# Patient Record
Sex: MALE | Race: ASIAN | NOT HISPANIC OR LATINO | URBAN - METROPOLITAN AREA
[De-identification: names, ages, dates, MRNs, and addresses within clinical notes are randomized per-mention and may not be internally consistent; named-entity substitution may affect disease eponyms.]

---

## 2018-01-08 ENCOUNTER — OUTPATIENT (OUTPATIENT)
Dept: OUTPATIENT SERVICES | Facility: HOSPITAL | Age: 78
LOS: 1 days | End: 2018-01-08
Payer: COMMERCIAL

## 2018-01-08 ENCOUNTER — APPOINTMENT (OUTPATIENT)
Dept: CV DIAGNOSTICS | Facility: HOSPITAL | Age: 78
End: 2018-01-08

## 2018-01-08 DIAGNOSIS — R07.2 PRECORDIAL PAIN: ICD-10-CM

## 2018-01-08 PROCEDURE — 93016 CV STRESS TEST SUPVJ ONLY: CPT

## 2018-01-08 PROCEDURE — 93017 CV STRESS TEST TRACING ONLY: CPT

## 2018-01-08 PROCEDURE — 78452 HT MUSCLE IMAGE SPECT MULT: CPT | Mod: 26

## 2018-01-08 PROCEDURE — 78452 HT MUSCLE IMAGE SPECT MULT: CPT

## 2018-01-08 PROCEDURE — A9500: CPT

## 2018-01-08 PROCEDURE — 93018 CV STRESS TEST I&R ONLY: CPT

## 2018-02-01 ENCOUNTER — APPOINTMENT (OUTPATIENT)
Dept: MRI IMAGING | Facility: CLINIC | Age: 78
End: 2018-02-01
Payer: MEDICAID

## 2018-02-01 ENCOUNTER — OUTPATIENT (OUTPATIENT)
Dept: OUTPATIENT SERVICES | Facility: HOSPITAL | Age: 78
LOS: 1 days | End: 2018-02-01
Payer: COMMERCIAL

## 2018-02-01 DIAGNOSIS — Z00.8 ENCOUNTER FOR OTHER GENERAL EXAMINATION: ICD-10-CM

## 2018-02-01 PROCEDURE — 70551 MRI BRAIN STEM W/O DYE: CPT | Mod: 26

## 2018-02-01 PROCEDURE — 70551 MRI BRAIN STEM W/O DYE: CPT

## 2018-02-13 ENCOUNTER — APPOINTMENT (OUTPATIENT)
Dept: PULMONOLOGY | Facility: CLINIC | Age: 78
End: 2018-02-13

## 2018-04-10 ENCOUNTER — APPOINTMENT (OUTPATIENT)
Dept: PULMONOLOGY | Facility: CLINIC | Age: 78
End: 2018-04-10

## 2018-08-01 ENCOUNTER — OUTPATIENT (OUTPATIENT)
Dept: OUTPATIENT SERVICES | Facility: HOSPITAL | Age: 78
LOS: 1 days | End: 2018-08-01
Payer: MEDICARE

## 2018-08-01 PROCEDURE — G9001: CPT

## 2018-08-22 ENCOUNTER — INPATIENT (INPATIENT)
Facility: HOSPITAL | Age: 78
LOS: 1 days | Discharge: HOME | DRG: 872 | End: 2018-08-24
Attending: INTERNAL MEDICINE | Admitting: INTERNAL MEDICINE
Payer: MEDICARE

## 2018-08-22 VITALS
OXYGEN SATURATION: 94 % | DIASTOLIC BLOOD PRESSURE: 50 MMHG | TEMPERATURE: 99 F | WEIGHT: 169.98 LBS | HEIGHT: 65 IN | HEART RATE: 71 BPM | RESPIRATION RATE: 15 BRPM | SYSTOLIC BLOOD PRESSURE: 76 MMHG

## 2018-08-22 DIAGNOSIS — N39.0 URINARY TRACT INFECTION, SITE NOT SPECIFIED: ICD-10-CM

## 2018-08-22 DIAGNOSIS — Z98.41 CATARACT EXTRACTION STATUS, RIGHT EYE: Chronic | ICD-10-CM

## 2018-08-22 LAB
ALBUMIN SERPL ELPH-MCNC: 3.3 G/DL — SIGNIFICANT CHANGE UP (ref 3.3–5)
ALP SERPL-CCNC: 61 U/L — SIGNIFICANT CHANGE UP (ref 30–120)
ALT FLD-CCNC: 16 U/L DA — SIGNIFICANT CHANGE UP (ref 10–60)
ANION GAP SERPL CALC-SCNC: 14 MMOL/L — SIGNIFICANT CHANGE UP (ref 5–17)
APPEARANCE UR: ABNORMAL
AST SERPL-CCNC: 24 U/L — SIGNIFICANT CHANGE UP (ref 10–40)
BASOPHILS # BLD AUTO: 0 K/UL — SIGNIFICANT CHANGE UP (ref 0–0.2)
BASOPHILS NFR BLD AUTO: 0 % — SIGNIFICANT CHANGE UP (ref 0–2)
BILIRUB SERPL-MCNC: 0.9 MG/DL — SIGNIFICANT CHANGE UP (ref 0.2–1.2)
BILIRUB UR-MCNC: NEGATIVE — SIGNIFICANT CHANGE UP
BUN SERPL-MCNC: 33 MG/DL — HIGH (ref 7–23)
CALCIUM SERPL-MCNC: 8.4 MG/DL — SIGNIFICANT CHANGE UP (ref 8.4–10.5)
CHLORIDE SERPL-SCNC: 100 MMOL/L — SIGNIFICANT CHANGE UP (ref 96–108)
CK MB BLD-MCNC: 0.6 % — SIGNIFICANT CHANGE UP (ref 0–3.5)
CK MB CFR SERPL CALC: 3.9 NG/ML — HIGH (ref 0–3.6)
CK SERPL-CCNC: 697 U/L — HIGH (ref 39–308)
CO2 SERPL-SCNC: 21 MMOL/L — LOW (ref 22–31)
COLOR SPEC: YELLOW — SIGNIFICANT CHANGE UP
CREAT SERPL-MCNC: 1.89 MG/DL — HIGH (ref 0.5–1.3)
DIFF PNL FLD: ABNORMAL
EOSINOPHIL # BLD AUTO: 0 K/UL — SIGNIFICANT CHANGE UP (ref 0–0.5)
EOSINOPHIL NFR BLD AUTO: 0 % — SIGNIFICANT CHANGE UP (ref 0–6)
GLUCOSE SERPL-MCNC: 101 MG/DL — HIGH (ref 70–99)
GLUCOSE UR QL: NEGATIVE MG/DL — SIGNIFICANT CHANGE UP
HCT VFR BLD CALC: 35.8 % — LOW (ref 39–50)
HGB BLD-MCNC: 12.2 G/DL — LOW (ref 13–17)
KETONES UR-MCNC: NEGATIVE — SIGNIFICANT CHANGE UP
LACTATE SERPL-SCNC: 1.6 MMOL/L — SIGNIFICANT CHANGE UP (ref 0.7–2)
LACTATE SERPL-SCNC: 2.4 MMOL/L — HIGH (ref 0.7–2)
LEUKOCYTE ESTERASE UR-ACNC: ABNORMAL
LIDOCAIN IGE QN: 74 U/L — SIGNIFICANT CHANGE UP (ref 73–393)
LYMPHOCYTES # BLD AUTO: 1.04 K/UL — SIGNIFICANT CHANGE UP (ref 1–3.3)
LYMPHOCYTES # BLD AUTO: 8 % — LOW (ref 13–44)
MCHC RBC-ENTMCNC: 29.3 PG — SIGNIFICANT CHANGE UP (ref 27–34)
MCHC RBC-ENTMCNC: 34.1 GM/DL — SIGNIFICANT CHANGE UP (ref 32–36)
MCV RBC AUTO: 86.1 FL — SIGNIFICANT CHANGE UP (ref 80–100)
MONOCYTES # BLD AUTO: 0.78 K/UL — SIGNIFICANT CHANGE UP (ref 0–0.9)
MONOCYTES NFR BLD AUTO: 6 % — SIGNIFICANT CHANGE UP (ref 2–14)
NEUTROPHILS # BLD AUTO: 11.17 K/UL — HIGH (ref 1.8–7.4)
NEUTROPHILS NFR BLD AUTO: 78 % — HIGH (ref 43–77)
NITRITE UR-MCNC: POSITIVE
PH UR: 5 — SIGNIFICANT CHANGE UP (ref 5–8)
PLATELET # BLD AUTO: 149 K/UL — LOW (ref 150–400)
POTASSIUM SERPL-MCNC: 3.7 MMOL/L — SIGNIFICANT CHANGE UP (ref 3.5–5.3)
POTASSIUM SERPL-SCNC: 3.7 MMOL/L — SIGNIFICANT CHANGE UP (ref 3.5–5.3)
PROT SERPL-MCNC: 6.9 G/DL — SIGNIFICANT CHANGE UP (ref 6–8.3)
PROT UR-MCNC: 30 MG/DL
RBC # BLD: 4.16 M/UL — LOW (ref 4.2–5.8)
RBC # FLD: 12.8 % — SIGNIFICANT CHANGE UP (ref 10.3–14.5)
SODIUM SERPL-SCNC: 135 MMOL/L — SIGNIFICANT CHANGE UP (ref 135–145)
SP GR SPEC: 1.01 — SIGNIFICANT CHANGE UP (ref 1.01–1.02)
TROPONIN I SERPL-MCNC: 0.14 NG/ML — HIGH (ref 0.02–0.06)
UROBILINOGEN FLD QL: NEGATIVE MG/DL — SIGNIFICANT CHANGE UP
WBC # BLD: 12.99 K/UL — HIGH (ref 3.8–10.5)
WBC # FLD AUTO: 12.99 K/UL — HIGH (ref 3.8–10.5)

## 2018-08-22 RX ORDER — ASPIRIN/CALCIUM CARB/MAGNESIUM 324 MG
325 TABLET ORAL ONCE
Qty: 0 | Refills: 0 | Status: COMPLETED | OUTPATIENT
Start: 2018-08-22 | End: 2018-08-22

## 2018-08-22 RX ORDER — NITROGLYCERIN 6.5 MG
0.4 CAPSULE, EXTENDED RELEASE ORAL ONCE
Qty: 0 | Refills: 0 | Status: COMPLETED | OUTPATIENT
Start: 2018-08-22 | End: 2018-08-22

## 2018-08-22 RX ORDER — ACETAMINOPHEN 500 MG
650 TABLET ORAL ONCE
Qty: 0 | Refills: 0 | Status: COMPLETED | OUTPATIENT
Start: 2018-08-22 | End: 2018-08-22

## 2018-08-22 RX ORDER — SODIUM CHLORIDE 9 MG/ML
1000 INJECTION INTRAMUSCULAR; INTRAVENOUS; SUBCUTANEOUS ONCE
Qty: 0 | Refills: 0 | Status: COMPLETED | OUTPATIENT
Start: 2018-08-22 | End: 2018-08-22

## 2018-08-22 RX ORDER — SODIUM CHLORIDE 9 MG/ML
500 INJECTION INTRAMUSCULAR; INTRAVENOUS; SUBCUTANEOUS ONCE
Qty: 0 | Refills: 0 | Status: COMPLETED | OUTPATIENT
Start: 2018-08-22 | End: 2018-08-22

## 2018-08-22 RX ORDER — CEFTRIAXONE 500 MG/1
1 INJECTION, POWDER, FOR SOLUTION INTRAMUSCULAR; INTRAVENOUS ONCE
Qty: 0 | Refills: 0 | Status: COMPLETED | OUTPATIENT
Start: 2018-08-22 | End: 2018-08-22

## 2018-08-22 RX ADMIN — SODIUM CHLORIDE 1000 MILLILITER(S): 9 INJECTION INTRAMUSCULAR; INTRAVENOUS; SUBCUTANEOUS at 19:23

## 2018-08-22 RX ADMIN — Medication 0.4 MILLIGRAM(S): at 23:41

## 2018-08-22 RX ADMIN — SODIUM CHLORIDE 1000 MILLILITER(S): 9 INJECTION INTRAMUSCULAR; INTRAVENOUS; SUBCUTANEOUS at 16:45

## 2018-08-22 RX ADMIN — CEFTRIAXONE 1 GRAM(S): 500 INJECTION, POWDER, FOR SOLUTION INTRAMUSCULAR; INTRAVENOUS at 18:03

## 2018-08-22 RX ADMIN — Medication 325 MILLIGRAM(S): at 18:27

## 2018-08-22 RX ADMIN — CEFTRIAXONE 100 GRAM(S): 500 INJECTION, POWDER, FOR SOLUTION INTRAMUSCULAR; INTRAVENOUS at 17:33

## 2018-08-22 RX ADMIN — SODIUM CHLORIDE 500 MILLILITER(S): 9 INJECTION INTRAMUSCULAR; INTRAVENOUS; SUBCUTANEOUS at 19:45

## 2018-08-22 RX ADMIN — SODIUM CHLORIDE 500 MILLILITER(S): 9 INJECTION INTRAMUSCULAR; INTRAVENOUS; SUBCUTANEOUS at 18:45

## 2018-08-22 RX ADMIN — SODIUM CHLORIDE 1000 MILLILITER(S): 9 INJECTION INTRAMUSCULAR; INTRAVENOUS; SUBCUTANEOUS at 18:23

## 2018-08-22 RX ADMIN — SODIUM CHLORIDE 1000 MILLILITER(S): 9 INJECTION INTRAMUSCULAR; INTRAVENOUS; SUBCUTANEOUS at 17:45

## 2018-08-22 RX ADMIN — Medication 650 MILLIGRAM(S): at 17:03

## 2018-08-22 NOTE — H&P ADULT - NSHPREVIEWOFSYSTEMS_GEN_ALL_CORE
-    CONSTITUTIONAL: (+) fever & chills, with generalized body aches.  EYES: No eye pain, visual disturbances (blind left eye), or discharge.  ENMT:  No difficulty hearing, tinnitus, vertigo; No sinus or throat pain.  NECK: No pain or stiffness.  RESPIRATORY: No cough, wheezing, or hemoptysis; No shortness of breath.  CARDIOVASCULAR: No chest pain, palpitations, dizziness, or leg swelling.  GASTROINTESTINAL: No abdominal pain, no nausea, vomiting, or hematemesis; No diarrhea or Change in bowel habits. No melena or hematochezia.  GENITOURINARY: (+) dysuria, no abnormal frequency, incontinence.  NEUROLOGICAL: No headaches, focal muscle weakness, numbness, or tremors.  SKIN: No itching, burning or rashes.  MUSCULOSKELETAL: No joint swelling or pain.  PSYCHIATRIC: No depression, anxiety, or agitation.  HEME/LYMPH: No easy bruising, bleeding gums, or nose bleed.  ALLERGY AND IMMUNOLOGIC: No hives or eczema.

## 2018-08-22 NOTE — CONSULT NOTE ADULT - SUBJECTIVE AND OBJECTIVE BOX
History of Present Illness: The patient is a 78 year old male with a history of HTN, left eye blindness who presents with nausea, vomiting, dysuria since last night. He noted some blood in his urine today. As per family, he has had multiple UTIs this past year. He went to urgent care today and was referred to ED. In ED, noted to be hypotensive and results consistent with UTI. He was also noted to have mildly elevated cardiac enzymes. He has had intermittent left-sided chest/shoulder pain over the past few weeks when he is lying down, none when he exerts himself. No shortness of breath. He had a nuclear stress test one year ago, which was normal.    Past Medical/Surgical History:  HTN, left eye blindness    Medications:  Home Medications:  aspirin 81 mg oral tablet: 1 tab(s) orally once a day (22 Aug 2018 20:09)  atenolol    Family History: Non-contributory family history of premature cardiovascular atherosclerotic disease    Social History: No tobacco, alcohol or drug use    Review of Systems:  General: No fevers, chills, weight loss or gain  Skin: No rashes, color changes  Cardiovascular: No chest pain, orthopnea  Respiratory: No shortness of breath, cough  Gastrointestinal: No nausea, abdominal pain  Genitourinary: No incontinence, pain with urination  Musculoskeletal: No pain, swelling, decreased range of motion  Neurological: No headache, weakness  Psychiatric: No depression, anxiety  Endocrine: No weight loss or gain, increased thirst  All other systems are comprehensively negative.    Physical Exam:  Vitals:        Vital Signs Last 24 Hrs  T(C): 37.1 (22 Aug 2018 18:28), Max: 37.1 (22 Aug 2018 16:14)  T(F): 98.7 (22 Aug 2018 18:28), Max: 98.8 (22 Aug 2018 16:14)  HR: 65 (22 Aug 2018 19:15) (65 - 78)  BP: 103/50 (22 Aug 2018 19:15) (76/50 - 113/53)  BP(mean): --  RR: 15 (22 Aug 2018 19:15) (15 - 27)  SpO2: 96% (22 Aug 2018 19:15) (94% - 97%)  General: NAD  HEENT: MMM  Neck: No JVD, no carotid bruit  Lungs: CTAB  CV: RRR, nl S1/S2, no M/R/G  Abdomen: S/NT/ND, +BS  Extremities: No LE edema, no cyanosis  Neuro: AAOx3, non-focal  Skin: No rash    Labs:                        12.2   12.99 )-----------( 149      ( 22 Aug 2018 16:57 )             35.8     08-22    135  |  100  |  33<H>  ----------------------------<  101<H>  3.7   |  21<L>  |  1.89<H>    Ca    8.4      22 Aug 2018 16:57    TPro  6.9  /  Alb  3.3  /  TBili  0.9  /  DBili  x   /  AST  24  /  ALT  16  /  AlkPhos  61  08-22    CARDIAC MARKERS ( 22 Aug 2018 16:57 )  .136 ng/mL / x     / 697 U/L / x     / 3.9 ng/mL          ECG: NSR, normal axis, no ST abnormality

## 2018-08-22 NOTE — ED PROVIDER NOTE - ATTENDING CONTRIBUTION TO CARE
Pt is a 79 yo male who presents to the ED with a cc of vomiting and chills.   PMHx of Blind left eye    Hypertension.  Pt reports that he has not been feeling well since yesterday.  He reports subjective fevers, chills, nausea and vomiting.  He further reports some suprapubic pressure, dysuria and frequency.  He was seen at urgent care and diagnosed with a UTI but sent to the ED for further work up.  Pt denies D/C, CP, SOB, ext numbness or weakness.  On exam pt lying in bed diaphoretic.  NCAT, dry MMM, heart RRR, lungs diminished at the bases, abd with TTP to suprapubic region.  Will obtain screening septic work up, EKG, cardiac enzymes, x-ray chest, CT renal stone hunt.  Will give IVF.  Agree with above plan of care

## 2018-08-22 NOTE — ED PROVIDER NOTE - NONTENDER LOCATION
periumbilical/left costovertebral angle/left upper quadrant/right upper quadrant/right costovertebral angle/suprapubic/left lower quadrant/right lower quadrant/umbilical

## 2018-08-22 NOTE — ED ADULT NURSE NOTE - OBJECTIVE STATEMENT
Patient walked into ER with family from [facility] for body aches, vomiting, dysuria for one day and blood pressure is low 110/60.  Patient has not taken his atenolol for 3 days now .  Urine test at urgent was positive for nitrates, leuks and blood.  B/P very low in triage so placed on stretcher for comfort.  Family at bedside who translates for patient.

## 2018-08-22 NOTE — H&P ADULT - PROBLEM SELECTOR PLAN 6
IMPROVE VTE Individual Risk Assessment          RISK                                                          Points    [  ] Previous VTE                                                3  [  ] Thrombophilia                                             2  [  ] Lower limb paralysis                                    2        (unable to hold up >15 seconds)    [  ] Current Cancer                                             2         (within 6 months)  [ x ] Immobilization > 24 hrs        (expected)        1  [  ] ICU/CCU stay > 24 hours                            1  [ x ] Age > 60                                                    1    IMPROVE VTE Score 2.    ** Patient is at moderate to high risk for VTE, IMPROVE score of 2 in addition to the other risk factors not included in this scoring system, started him on UFH 5000 units subcutaneous every 8 hours for DVT prophylaxis.

## 2018-08-22 NOTE — ED PROVIDER NOTE - OBJECTIVE STATEMENT
77 y/o M with hx of HTN presents with c/o vomiting, generalized weakness and bodyaches x 1 day. 79 y/o M with hx of HTN presents with c/o vomiting, generalized weakness and body aches x 1 day. Pt 79 y/o M with hx of HTN presents with c/o dysuria, vomiting, generalized weakness and body aches x 1 day. Pt states that he was vomiting since last night until 9am this morning. Pt states that he noticed mild blood in his urine today. States that he was seen at urgent care today, had abnormal UA with positive nitrite, large leuks and blood. Pt denies recent travel, sick contacts, abdominal pain, C 79 y/o M with hx of HTN, L eye blindness presents with c/o dysuria, vomiting, generalized weakness, chills since yesterday. Pt states that he was vomiting since last night until 9am this morning. Pt states that he noticed mild blood in his urine today. States that he had body aches yesterday. States that he was seen at urgent care today, had abnormal UA with positive nitrite, large leuks and blood. Pt denies recent travel, sick contacts, abdominal pain, chest pain, SOB, palpitations, fever, back pain, rash, diarrhea, back/flank pain or other symptoms.   PMD: Dr. Fara Allen

## 2018-08-22 NOTE — H&P ADULT - NSHPLABSRESULTS_GEN_ALL_CORE
-      Lactate Trend   @ 20:40 Lactate:1.6    @ 16:57 Lactate:2.4                           12.2   12.99 )-----------( 149      ( 22 Aug 2018 16:57 )             35.8                135  |  100  |  33<H>  ----------------------------<  101<H>  3.7   |  21<L>  |  1.89<H>    Ca    8.4      22 Aug 2018 16:57    TPro  6.9  /  Alb  3.3  /  TBili  0.9  /  DBili  x   /  AST  24  /  ALT  16  /  AlkPhos  61            Urinalysis Basic - ( 22 Aug 2018 17:26 )    Color: Yellow / Appearance: Slightly Turbid / S.010 / pH: x  Gluc: x / Ketone: Negative  / Bili: Negative / Urobili: Negative mg/dL   Blood: x / Protein: 30 mg/dL / Nitrite: Positive   Leuk Esterase: Moderate / RBC: 3-5 /HPF / WBC 11-25   Sq Epi: x / Non Sq Epi: x / Bacteria: x        CARDIAC MARKERS ( 22 Aug 2018 23:42 )  .082 ng/mL / x     / x     / x     / x      CARDIAC MARKERS ( 22 Aug 2018 16:57 )  .136 ng/mL / x     / 697 U/L / x     / 3.9 ng/mL        CT RENAL STONE LOPEZ                    Bladder wall thickening and inflammation suggesting cystitis.   Urinalysis correlation is recommended.   Mild left hydronephrosis without visible obstructing stone.        XR CHEST AP OR PA 1V     A frontal chest film demonstrates scattered emphysematous and fibrotic   changes in both lungs without acute airspace disease.  The heart size and vascular markings are within normal limits for   technique.    No mediastinal or hilar adenopathy is suggested. .   Old rib fracture deformities are noted on the right..   IMPRESSION:  Fibrotic changes both lungs without acute airspace disease.  Image reviewed by me.        EKG:    As per my review shows SR at 75/min, normal OH & QTc intervals, normal QRS voltage, duration, and axis (+45), with normal transition, no ST-T abnormality.      -
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
## 2018-08-22 NOTE — H&P ADULT - HISTORY OF PRESENT ILLNESS
This is a 77 y/o M with PMH of HTN, and Glaucoma s/p left eye blindness, who presented with 3 days history of fever & chills, associated with generalized weakness & body aches. Patient also reports nausea and vomiting last night, with dysuria, so he was seen at an urgent care center, was found to have UTI, sent to ED for further assessment. Denies any chest pain, palpitation, or SOB, no diarrhea or abdominal pain. Patient took 2 doses of Aspirin 325 mg at home 4 hours apart prior to presentation to ED.

## 2018-08-22 NOTE — ED ADULT TRIAGE NOTE - CHIEF COMPLAINT QUOTE
Through family that interprets patient had vomiting from last night until 9AM and abdominal pains with body aches and shaking chills. The patient feels weak now. Patient was seen at Urgent Care and had abnormal UA results.

## 2018-08-22 NOTE — H&P ADULT - PROBLEM SELECTOR PLAN 2
ML 2ry to the above, no documented temp at home, patient received 2 doses of Aspirin 325 mg PO 4 hours apart prior to presentation, lactic acid got normalized after initial IVF bolus, will maintain at 125 ml/h, monitor I & O, trend TLC and f/u culture results while on antibiotic therapy as above.

## 2018-08-22 NOTE — ED PROVIDER NOTE - PROGRESS NOTE DETAILS
Pt examined by ED attending, Dr. Irving who agreed with disposition and plan. Spoke to cardiologist, Dr. Tariq Crocker, will see pt in ED for consult for elevated troponin. Spoke to hospitalist, Dr. Olvera, discussed case and results, will Spoke to hospitalist, Dr. Olvera, discussed case and results, accepted admission to his service.

## 2018-08-22 NOTE — CONSULT NOTE ADULT - ASSESSMENT
The patient is a 78 year old male with a history of HTN, left eye blindness who is admitted with urosepsis.    Plan:  - ECG with no evidence of ischemia or infarction  - Troponin mildly elevated at 0.136 in the setting of infection and hypotension. Continue to trend until trending down.  - Continue IVF  - Continue antibiotics  - Hold atenolol; resume as BPs improve  - Continue aspirin

## 2018-08-22 NOTE — ED ADULT NURSE NOTE - NSIMPLEMENTINTERV_GEN_ALL_ED
Implemented All Fall with Harm Risk Interventions:  [place] to call system. Call bell, personal items and telephone within reach. Instruct patient to call for assistance. Room bathroom lighting operational. Non-slip footwear when patient is off stretcher. Physically safe environment: no spills, clutter or unnecessary equipment. Stretcher in lowest position, wheels locked, appropriate side rails in place. Provide visual cue, wrist band, yellow gown, etc. Monitor gait and stability. Monitor for mental status changes and reorient to person, place, and time. Review medications for side effects contributing to fall risk. Reinforce activity limits and safety measures with patient and family. Provide visual clues: red socks.

## 2018-08-22 NOTE — H&P ADULT - PROBLEM SELECTOR PLAN 4
possibly acute on chronic, no available records, patient denies awareness of any kidney problems, will repeat after overnight IVF hydration, avoid nephrotoxic meds.

## 2018-08-22 NOTE — H&P ADULT - PROBLEM SELECTOR PLAN 3
Mild, possibly related o demand ischemia in the setting of renal dysfunction, no EKG abnormality, will trend. Cardiology consult was called by ED team, Dr. Zaldivar already saw patient, and will follow.

## 2018-08-22 NOTE — ED PROVIDER NOTE - MEDICAL DECISION MAKING DETAILS
79 y/o M with hx of htn here with vomiting, generalized weakness, chills, dysuria/hematuria since yesterday; hypotensive in ED; will give ivf, labs, ekg, cultures, ua, abx, re-assess

## 2018-08-22 NOTE — H&P ADULT - PROBLEM SELECTOR PLAN 1
CT ruled out underlying kidney stones, no obstruction, started on Rocephin 1 gm IVPB, 1st dose given by ED after cultures obtained, will follow results & trend TLC.

## 2018-08-23 DIAGNOSIS — N39.0 URINARY TRACT INFECTION, SITE NOT SPECIFIED: ICD-10-CM

## 2018-08-23 DIAGNOSIS — N28.9 DISORDER OF KIDNEY AND URETER, UNSPECIFIED: ICD-10-CM

## 2018-08-23 DIAGNOSIS — R74.8 ABNORMAL LEVELS OF OTHER SERUM ENZYMES: ICD-10-CM

## 2018-08-23 DIAGNOSIS — Z29.9 ENCOUNTER FOR PROPHYLACTIC MEASURES, UNSPECIFIED: ICD-10-CM

## 2018-08-23 DIAGNOSIS — D64.9 ANEMIA, UNSPECIFIED: ICD-10-CM

## 2018-08-23 DIAGNOSIS — A41.9 SEPSIS, UNSPECIFIED ORGANISM: ICD-10-CM

## 2018-08-23 LAB
ANION GAP SERPL CALC-SCNC: 10 MMOL/L — SIGNIFICANT CHANGE UP (ref 5–17)
BASOPHILS # BLD AUTO: 0.04 K/UL — SIGNIFICANT CHANGE UP (ref 0–0.2)
BASOPHILS NFR BLD AUTO: 0.4 % — SIGNIFICANT CHANGE UP (ref 0–2)
BUN SERPL-MCNC: 25 MG/DL — HIGH (ref 7–23)
CALCIUM SERPL-MCNC: 8.1 MG/DL — LOW (ref 8.4–10.5)
CHLORIDE SERPL-SCNC: 109 MMOL/L — HIGH (ref 96–108)
CO2 SERPL-SCNC: 23 MMOL/L — SIGNIFICANT CHANGE UP (ref 22–31)
CREAT SERPL-MCNC: 1.15 MG/DL — SIGNIFICANT CHANGE UP (ref 0.5–1.3)
CULTURE RESULTS: NO GROWTH — SIGNIFICANT CHANGE UP
EOSINOPHIL # BLD AUTO: 0.1 K/UL — SIGNIFICANT CHANGE UP (ref 0–0.5)
EOSINOPHIL NFR BLD AUTO: 0.9 % — SIGNIFICANT CHANGE UP (ref 0–6)
GLUCOSE SERPL-MCNC: 92 MG/DL — SIGNIFICANT CHANGE UP (ref 70–99)
HCT VFR BLD CALC: 36 % — LOW (ref 39–50)
HGB BLD-MCNC: 12.1 G/DL — LOW (ref 13–17)
IMM GRANULOCYTES NFR BLD AUTO: 0.7 % — SIGNIFICANT CHANGE UP (ref 0–1.5)
LYMPHOCYTES # BLD AUTO: 1.18 K/UL — SIGNIFICANT CHANGE UP (ref 1–3.3)
LYMPHOCYTES # BLD AUTO: 10.9 % — LOW (ref 13–44)
MAGNESIUM SERPL-MCNC: 1.8 MG/DL — SIGNIFICANT CHANGE UP (ref 1.6–2.6)
MCHC RBC-ENTMCNC: 29.2 PG — SIGNIFICANT CHANGE UP (ref 27–34)
MCHC RBC-ENTMCNC: 33.6 GM/DL — SIGNIFICANT CHANGE UP (ref 32–36)
MCV RBC AUTO: 87 FL — SIGNIFICANT CHANGE UP (ref 80–100)
MONOCYTES # BLD AUTO: 0.47 K/UL — SIGNIFICANT CHANGE UP (ref 0–0.9)
MONOCYTES NFR BLD AUTO: 4.3 % — SIGNIFICANT CHANGE UP (ref 2–14)
NEUTROPHILS # BLD AUTO: 8.95 K/UL — HIGH (ref 1.8–7.4)
NEUTROPHILS NFR BLD AUTO: 82.8 % — HIGH (ref 43–77)
PLATELET # BLD AUTO: 124 K/UL — LOW (ref 150–400)
POTASSIUM SERPL-MCNC: 3.6 MMOL/L — SIGNIFICANT CHANGE UP (ref 3.5–5.3)
POTASSIUM SERPL-SCNC: 3.6 MMOL/L — SIGNIFICANT CHANGE UP (ref 3.5–5.3)
RBC # BLD: 4.14 M/UL — LOW (ref 4.2–5.8)
RBC # FLD: 13 % — SIGNIFICANT CHANGE UP (ref 10.3–14.5)
SODIUM SERPL-SCNC: 142 MMOL/L — SIGNIFICANT CHANGE UP (ref 135–145)
SPECIMEN SOURCE: SIGNIFICANT CHANGE UP
TROPONIN I SERPL-MCNC: 0.05 NG/ML — SIGNIFICANT CHANGE UP (ref 0.02–0.06)
TROPONIN I SERPL-MCNC: 0.08 NG/ML — HIGH (ref 0.02–0.06)
WBC # BLD: 10.82 K/UL — HIGH (ref 3.8–10.5)
WBC # FLD AUTO: 10.82 K/UL — HIGH (ref 3.8–10.5)

## 2018-08-23 RX ORDER — LATANOPROST 0.05 MG/ML
1 SOLUTION/ DROPS OPHTHALMIC; TOPICAL DAILY
Qty: 0 | Refills: 0 | Status: DISCONTINUED | OUTPATIENT
Start: 2018-08-23 | End: 2018-08-24

## 2018-08-23 RX ORDER — SODIUM CHLORIDE 9 MG/ML
1000 INJECTION INTRAMUSCULAR; INTRAVENOUS; SUBCUTANEOUS
Qty: 0 | Refills: 0 | Status: DISCONTINUED | OUTPATIENT
Start: 2018-08-23 | End: 2018-08-24

## 2018-08-23 RX ORDER — CEFTRIAXONE 500 MG/1
1 INJECTION, POWDER, FOR SOLUTION INTRAMUSCULAR; INTRAVENOUS EVERY 24 HOURS
Qty: 0 | Refills: 0 | Status: DISCONTINUED | OUTPATIENT
Start: 2018-08-23 | End: 2018-08-24

## 2018-08-23 RX ORDER — HEPARIN SODIUM 5000 [USP'U]/ML
5000 INJECTION INTRAVENOUS; SUBCUTANEOUS EVERY 8 HOURS
Qty: 0 | Refills: 0 | Status: DISCONTINUED | OUTPATIENT
Start: 2018-08-23 | End: 2018-08-24

## 2018-08-23 RX ORDER — ASPIRIN/CALCIUM CARB/MAGNESIUM 324 MG
81 TABLET ORAL DAILY
Qty: 0 | Refills: 0 | Status: DISCONTINUED | OUTPATIENT
Start: 2018-08-23 | End: 2018-08-24

## 2018-08-23 RX ORDER — DORZOLAMIDE HYDROCHLORIDE TIMOLOL MALEATE 20; 5 MG/ML; MG/ML
2 SOLUTION/ DROPS OPHTHALMIC
Qty: 0 | Refills: 0 | Status: DISCONTINUED | OUTPATIENT
Start: 2018-08-23 | End: 2018-08-24

## 2018-08-23 RX ADMIN — HEPARIN SODIUM 5000 UNIT(S): 5000 INJECTION INTRAVENOUS; SUBCUTANEOUS at 14:54

## 2018-08-23 RX ADMIN — LATANOPROST 1 DROP(S): 0.05 SOLUTION/ DROPS OPHTHALMIC; TOPICAL at 21:36

## 2018-08-23 RX ADMIN — CEFTRIAXONE 100 GRAM(S): 500 INJECTION, POWDER, FOR SOLUTION INTRAMUSCULAR; INTRAVENOUS at 17:17

## 2018-08-23 RX ADMIN — HEPARIN SODIUM 5000 UNIT(S): 5000 INJECTION INTRAVENOUS; SUBCUTANEOUS at 06:17

## 2018-08-23 RX ADMIN — SODIUM CHLORIDE 125 MILLILITER(S): 9 INJECTION INTRAMUSCULAR; INTRAVENOUS; SUBCUTANEOUS at 06:17

## 2018-08-23 RX ADMIN — Medication 81 MILLIGRAM(S): at 12:54

## 2018-08-23 RX ADMIN — HEPARIN SODIUM 5000 UNIT(S): 5000 INJECTION INTRAVENOUS; SUBCUTANEOUS at 21:36

## 2018-08-23 RX ADMIN — SODIUM CHLORIDE 125 MILLILITER(S): 9 INJECTION INTRAMUSCULAR; INTRAVENOUS; SUBCUTANEOUS at 12:54

## 2018-08-24 ENCOUNTER — TRANSCRIPTION ENCOUNTER (OUTPATIENT)
Age: 78
End: 2018-08-24

## 2018-08-24 VITALS
TEMPERATURE: 99 F | OXYGEN SATURATION: 98 % | SYSTOLIC BLOOD PRESSURE: 185 MMHG | HEART RATE: 78 BPM | DIASTOLIC BLOOD PRESSURE: 76 MMHG | RESPIRATION RATE: 18 BRPM

## 2018-08-24 DIAGNOSIS — Z71.89 OTHER SPECIFIED COUNSELING: ICD-10-CM

## 2018-08-24 DIAGNOSIS — I10 ESSENTIAL (PRIMARY) HYPERTENSION: ICD-10-CM

## 2018-08-24 DIAGNOSIS — N18.2 CHRONIC KIDNEY DISEASE, STAGE 2 (MILD): ICD-10-CM

## 2018-08-24 LAB
ANION GAP SERPL CALC-SCNC: 8 MMOL/L — SIGNIFICANT CHANGE UP (ref 5–17)
BUN SERPL-MCNC: 14 MG/DL — SIGNIFICANT CHANGE UP (ref 7–23)
CALCIUM SERPL-MCNC: 8.3 MG/DL — LOW (ref 8.4–10.5)
CHLORIDE SERPL-SCNC: 110 MMOL/L — HIGH (ref 96–108)
CO2 SERPL-SCNC: 24 MMOL/L — SIGNIFICANT CHANGE UP (ref 22–31)
CREAT SERPL-MCNC: 0.94 MG/DL — SIGNIFICANT CHANGE UP (ref 0.5–1.3)
GLUCOSE SERPL-MCNC: 116 MG/DL — HIGH (ref 70–99)
HCT VFR BLD CALC: 35.1 % — LOW (ref 39–50)
HGB BLD-MCNC: 11.8 G/DL — LOW (ref 13–17)
MCHC RBC-ENTMCNC: 28.9 PG — SIGNIFICANT CHANGE UP (ref 27–34)
MCHC RBC-ENTMCNC: 33.6 GM/DL — SIGNIFICANT CHANGE UP (ref 32–36)
MCV RBC AUTO: 86 FL — SIGNIFICANT CHANGE UP (ref 80–100)
NRBC # BLD: 0 /100 WBCS — SIGNIFICANT CHANGE UP (ref 0–0)
PLATELET # BLD AUTO: 130 K/UL — LOW (ref 150–400)
POTASSIUM SERPL-MCNC: 3.4 MMOL/L — LOW (ref 3.5–5.3)
POTASSIUM SERPL-SCNC: 3.4 MMOL/L — LOW (ref 3.5–5.3)
RBC # BLD: 4.08 M/UL — LOW (ref 4.2–5.8)
RBC # FLD: 12.8 % — SIGNIFICANT CHANGE UP (ref 10.3–14.5)
SODIUM SERPL-SCNC: 142 MMOL/L — SIGNIFICANT CHANGE UP (ref 135–145)
WBC # BLD: 7.5 K/UL — SIGNIFICANT CHANGE UP (ref 3.8–10.5)
WBC # FLD AUTO: 7.5 K/UL — SIGNIFICANT CHANGE UP (ref 3.8–10.5)

## 2018-08-24 RX ORDER — CEFUROXIME AXETIL 250 MG
1 TABLET ORAL
Qty: 8 | Refills: 0 | OUTPATIENT
Start: 2018-08-24 | End: 2018-08-27

## 2018-08-24 RX ORDER — ATENOLOL 25 MG/1
25 TABLET ORAL DAILY
Qty: 0 | Refills: 0 | Status: DISCONTINUED | OUTPATIENT
Start: 2018-08-24 | End: 2018-08-24

## 2018-08-24 RX ORDER — LATANOPROST 0.05 MG/ML
1 SOLUTION/ DROPS OPHTHALMIC; TOPICAL
Qty: 0 | Refills: 0 | COMMUNITY
Start: 2018-08-24

## 2018-08-24 RX ORDER — DORZOLAMIDE HYDROCHLORIDE TIMOLOL MALEATE 20; 5 MG/ML; MG/ML
2 SOLUTION/ DROPS OPHTHALMIC
Qty: 0 | Refills: 0 | COMMUNITY
Start: 2018-08-24

## 2018-08-24 RX ORDER — CEFUROXIME AXETIL 250 MG
500 TABLET ORAL EVERY 12 HOURS
Qty: 0 | Refills: 0 | Status: DISCONTINUED | OUTPATIENT
Start: 2018-08-24 | End: 2018-08-24

## 2018-08-24 RX ORDER — ATENOLOL 25 MG/1
1 TABLET ORAL
Qty: 0 | Refills: 0 | COMMUNITY
Start: 2018-08-24

## 2018-08-24 RX ORDER — POTASSIUM CHLORIDE 20 MEQ
20 PACKET (EA) ORAL ONCE
Qty: 0 | Refills: 0 | Status: COMPLETED | OUTPATIENT
Start: 2018-08-24 | End: 2018-08-24

## 2018-08-24 RX ADMIN — DORZOLAMIDE HYDROCHLORIDE TIMOLOL MALEATE 2 DROP(S): 20; 5 SOLUTION/ DROPS OPHTHALMIC at 08:22

## 2018-08-24 RX ADMIN — HEPARIN SODIUM 5000 UNIT(S): 5000 INJECTION INTRAVENOUS; SUBCUTANEOUS at 14:53

## 2018-08-24 RX ADMIN — SODIUM CHLORIDE 125 MILLILITER(S): 9 INJECTION INTRAMUSCULAR; INTRAVENOUS; SUBCUTANEOUS at 11:46

## 2018-08-24 RX ADMIN — HEPARIN SODIUM 5000 UNIT(S): 5000 INJECTION INTRAVENOUS; SUBCUTANEOUS at 05:51

## 2018-08-24 RX ADMIN — LATANOPROST 1 DROP(S): 0.05 SOLUTION/ DROPS OPHTHALMIC; TOPICAL at 11:20

## 2018-08-24 RX ADMIN — Medication 81 MILLIGRAM(S): at 11:20

## 2018-08-24 RX ADMIN — Medication 20 MILLIEQUIVALENT(S): at 14:52

## 2018-08-24 NOTE — DISCHARGE NOTE ADULT - CARE PLAN
Principal Discharge DX:	Elevated troponin  Goal:	wnl.  Assessment and plan of treatment:	likely demand ischemia, HTN.  trended down.  Secondary Diagnosis:	Acute cystitis with hematuria  Assessment and plan of treatment:	ceftin 500mg bid for 4 days.  consider urology if persistent hematuria.  Secondary Diagnosis:	Essential hypertension  Assessment and plan of treatment:	c/w atenolo 25mg daily . pt has it home.  Secondary Diagnosis:	CKD (chronic kidney disease) stage 2, GFR 60-89 ml/min  Assessment and plan of treatment:	avoid nephrotoxic meds.   check bmp prn.  Secondary Diagnosis:	Hypokalemia  Assessment and plan of treatment:	replete K.  needs repeat bmp in 3-5days.  Secondary Diagnosis:	Blind left eye  Secondary Diagnosis:	Normocytic anemia  Assessment and plan of treatment:	w/u as an out pt. Principal Discharge DX:	Elevated troponin  Goal:	wnl.  Assessment and plan of treatment:	likely demand ischemia, HTN.  trended down.  Secondary Diagnosis:	Acute cystitis with hematuria  Assessment and plan of treatment:	ceftin 500mg bid for 4 days.  consider urology if persistent hematuria.  Secondary Diagnosis:	Essential hypertension  Assessment and plan of treatment:	c/w atenolo 25mg daily . pt has it home.  Secondary Diagnosis:	CKD (chronic kidney disease) stage 2, GFR 60-89 ml/min  Assessment and plan of treatment:	avoid nephrotoxic meds.   check bmp prn.  Secondary Diagnosis:	Hypokalemia  Assessment and plan of treatment:	replete K.  needs repeat bmp in 3-5days.  Secondary Diagnosis:	Blind left eye  Secondary Diagnosis:	Normocytic anemia  Assessment and plan of treatment:	w/u as an out pt.  c/w home eye drops.  f/u ophthalmology as an out pt. Principal Discharge DX:	Elevated troponin  Goal:	wnl.  Assessment and plan of treatment:	likely demand ischemia, HTN.  trended down.  Secondary Diagnosis:	Acute cystitis with hematuria  Assessment and plan of treatment:	ceftin 500mg bid for 4 days.  consider urology if persistent hematuria.  Secondary Diagnosis:	Essential hypertension  Assessment and plan of treatment:	c/w atenolo 25mg daily . pt has it home.  Secondary Diagnosis:	CKD (chronic kidney disease) stage 2, GFR 60-89 ml/min  Assessment and plan of treatment:	avoid nephrotoxic meds.   check bmp prn.  Secondary Diagnosis:	Hypokalemia  Assessment and plan of treatment:	replete K.  needs repeat bmp in 3-5days.  Secondary Diagnosis:	Blind left eye  Secondary Diagnosis:	Normocytic anemia  Assessment and plan of treatment:	w/u as an out pt.  c/w home eye drops.  message left to PMD' service.  f/u ophthalmology as an out pt.

## 2018-08-24 NOTE — PROGRESS NOTE ADULT - PROBLEM SELECTOR PLAN 8
pt had brief episode of atrial tachycardia last night, +PVC.  d/w cardiology and ok to d/c pt home today.  f/u PMD/cardiology  in 3-4 days.  He is on atenolol 25mg daily at home. will continue.

## 2018-08-24 NOTE — PROGRESS NOTE ADULT - ATTENDING COMMENTS
Plan of care was discuss with patient and family, all questions were answered, seems understand and in agreement.

## 2018-08-24 NOTE — PROGRESS NOTE ADULT - SUBJECTIVE AND OBJECTIVE BOX
Chief Complaint: UTI    Interval Events: No events overnight. Feels improved.    Review of Systems:  General: No fevers, chills, weight loss or gain  Skin: No rashes, color changes  Cardiovascular: No chest pain, orthopnea  Respiratory: No shortness of breath, cough  Gastrointestinal: No nausea, abdominal pain  Genitourinary: No incontinence, pain with urination  Musculoskeletal: No pain, swelling, decreased range of motion  Neurological: No headache, weakness  Psychiatric: No depression, anxiety  Endocrine: No weight loss or gain, increased thirst  All other systems are comprehensively negative.    Physical Exam:  Vitals:        Vital Signs Last 24 Hrs  T(C): 37.2 (23 Aug 2018 09:57), Max: 37.2 (23 Aug 2018 09:57)  T(F): 98.9 (23 Aug 2018 09:57), Max: 98.9 (23 Aug 2018 09:57)  HR: 59 (23 Aug 2018 09:57) (59 - 78)  BP: 112/64 (23 Aug 2018 09:57) (76/50 - 148/61)  BP(mean): 90 (23 Aug 2018 05:20) (90 - 90)  RR: 16 (23 Aug 2018 09:57) (15 - 27)  SpO2: 98% (23 Aug 2018 09:57) (94% - 98%)  General: NAD  HEENT: MMM  Neck: No JVD, no carotid bruit  Lungs: CTAB  CV: RRR, nl S1/S2, no M/R/G  Abdomen: S/NT/ND, +BS  Extremities: No LE edema, no cyanosis  Neuro: AAOx3, non-focal  Skin: No rash    Labs:                        12.1   10.82 )-----------( 124      ( 23 Aug 2018 07:01 )             36.0     08-23    142  |  109<H>  |  25<H>  ----------------------------<  92  3.6   |  23  |  1.15    Ca    8.1<L>      23 Aug 2018 07:01    TPro  6.9  /  Alb  3.3  /  TBili  0.9  /  DBili  x   /  AST  24  /  ALT  16  /  AlkPhos  61  08-22    CARDIAC MARKERS ( 23 Aug 2018 07:01 )  .049 ng/mL / x     / x     / x     / x      CARDIAC MARKERS ( 22 Aug 2018 23:42 )  .082 ng/mL / x     / x     / x     / x      CARDIAC MARKERS ( 22 Aug 2018 16:57 )  .136 ng/mL / x     / 697 U/L / x     / 3.9 ng/mL          Telemetry: Sinus rhythm
Chief Complaint: UTI    Interval Events: No events overnight. No complaints.    Review of Systems:  General: No fevers, chills, weight loss or gain  Skin: No rashes, color changes  Cardiovascular: No chest pain, orthopnea  Respiratory: No shortness of breath, cough  Gastrointestinal: No nausea, abdominal pain  Genitourinary: No incontinence, pain with urination  Musculoskeletal: No pain, swelling, decreased range of motion  Neurological: No headache, weakness  Psychiatric: No depression, anxiety  Endocrine: No weight loss or gain, increased thirst  All other systems are comprehensively negative.    Physical Exam:  Vital Signs Last 24 Hrs  T(C): 36.8 (24 Aug 2018 05:08), Max: 37.2 (23 Aug 2018 09:57)  T(F): 98.2 (24 Aug 2018 05:08), Max: 98.9 (23 Aug 2018 09:57)  HR: 66 (24 Aug 2018 05:08) (59 - 73)  BP: 162/80 (24 Aug 2018 05:08) (112/64 - 162/80)  BP(mean): --  RR: 18 (24 Aug 2018 05:08) (16 - 18)  SpO2: 95% (24 Aug 2018 05:08) (95% - 98%)  General: NAD  HEENT: MMM  Neck: No JVD, no carotid bruit  Lungs: CTAB  CV: RRR, nl S1/S2, no M/R/G  Abdomen: S/NT/ND, +BS  Extremities: No LE edema, no cyanosis  Neuro: AAOx3, non-focal  Skin: No rash    Labs:             08-24    142  |  110<H>  |  14  ----------------------------<  116<H>  3.4<L>   |  24  |  0.94    Ca    8.3<L>      24 Aug 2018 06:46  Mg     1.8     08-23    TPro  6.9  /  Alb  3.3  /  TBili  0.9  /  DBili  x   /  AST  24  /  ALT  16  /  AlkPhos  61  08-22                        11.8   7.50  )-----------( 130      ( 24 Aug 2018 06:46 )             35.1       Telemetry: Sinus rhythm, PVCs, ventricular bigeminy, atrial run
Patient is a 78y old  Male who presents with a chief complaint of N/V,fever,chills,dysuria,chestpain. (23 Aug 2018 02:32)      INTERVAL HPI/OVERNIGHT EVENTS:  Pt is seen and examined.  feels good.    Pain Location & Control:     MEDICATIONS  (STANDING):  aspirin enteric coated 81 milliGRAM(s) Oral daily  cefTRIAXone   IVPB 1 Gram(s) IV Intermittent every 24 hours  heparin  Injectable 5000 Unit(s) SubCutaneous every 8 hours  sodium chloride 0.9%. 1000 milliLiter(s) (125 mL/Hr) IV Continuous <Continuous>    MEDICATIONS  (PRN):      Allergies    No Known Allergies    Intolerances            Vital Signs Last 24 Hrs  T(C): 37.2 (23 Aug 2018 09:57), Max: 37.2 (23 Aug 2018 09:57)  T(F): 98.9 (23 Aug 2018 09:57), Max: 98.9 (23 Aug 2018 09:57)  HR: 59 (23 Aug 2018 09:57) (59 - 78)  BP: 112/64 (23 Aug 2018 09:57) (76/50 - 148/61)  BP(mean): 90 (23 Aug 2018 05:20) (90 - 90)  RR: 16 (23 Aug 2018 09:57) (15 - 27)  SpO2: 98% (23 Aug 2018 09:57) (94% - 98%)        LABS:                        12.1   10.82 )-----------( 124      ( 23 Aug 2018 07:01 )             36.0     23 Aug 2018 07:01    142    |  109    |  25     ----------------------------<  92     3.6     |  23     |  1.15     Ca    8.1        23 Aug 2018 07:01    TPro  6.9    /  Alb  3.3    /  TBili  0.9    /  DBili  x      /  AST  24     /  ALT  16     /  AlkPhos  61     22 Aug 2018 16:57      Urinalysis Basic - ( 22 Aug 2018 17:26 )    Color: Yellow / Appearance: Slightly Turbid / S.010 / pH: x  Gluc: x / Ketone: Negative  / Bili: Negative / Urobili: Negative mg/dL   Blood: x / Protein: 30 mg/dL / Nitrite: Positive   Leuk Esterase: Moderate / RBC: 3-5 /HPF / WBC 11-25   Sq Epi: x / Non Sq Epi: x / Bacteria: x      CAPILLARY BLOOD GLUCOSE        CARDIAC MARKERS ( 23 Aug 2018 07:01 )  .049 ng/mL / x     / x     / x     / x      CARDIAC MARKERS ( 22 Aug 2018 23:42 )  .082 ng/mL / x     / x     / x     / x      CARDIAC MARKERS ( 22 Aug 2018 16:57 )  .136 ng/mL / x     / 697 U/L / x     / 3.9 ng/mL      Cultures    Lactate, Blood: 1.6 mmol/L ( @ 20:40)  Lactate, Blood: 2.4 mmol/L ( @ 16:57)
Patient is a 78y old  Male who presents with a chief complaint of N/V,fever,chills,dysuria,chestpain. (23 Aug 2018 02:32)      INTERVAL HPI/OVERNIGHT EVENTS:  Pt is seen and examined. feels ok. family is present at bedside.  Donny any new complaint.    Pain Location & Control:     MEDICATIONS  (STANDING):  aspirin enteric coated 81 milliGRAM(s) Oral daily  ATENolol  Tablet 25 milliGRAM(s) Oral daily  cefTRIAXone   IVPB 1 Gram(s) IV Intermittent every 24 hours  dorzolamide 2%/timolol 0.5% Ophthalmic Solution 2 Drop(s) Both EYES <User Schedule>  heparin  Injectable 5000 Unit(s) SubCutaneous every 8 hours  latanoprost 0.005% Ophthalmic Solution 1 Drop(s) Both EYES daily  sodium chloride 0.9%. 1000 milliLiter(s) (125 mL/Hr) IV Continuous <Continuous>    MEDICATIONS  (PRN):      Allergies    No Known Allergies    Intolerances            Vital Signs Last 24 Hrs  T(C): 37.1 (24 Aug 2018 09:42), Max: 37.1 (24 Aug 2018 01:16)  T(F): 98.7 (24 Aug 2018 09:42), Max: 98.7 (24 Aug 2018 01:16)  HR: 55 (24 Aug 2018 11:47) (55 - 73)  BP: 162/80 (24 Aug 2018 09:42) (123/72 - 162/80)  BP(mean): --  RR: 20 (24 Aug 2018 09:42) (16 - 20)  SpO2: 97% (24 Aug 2018 09:42) (95% - 97%)        LABS:                        11.8   7.50  )-----------( 130      ( 24 Aug 2018 06:46 )             35.1     24 Aug 2018 06:46    142    |  110    |  14     ----------------------------<  116    3.4     |  24     |  0.94     Ca    8.3        24 Aug 2018 06:46  Mg     1.8       23 Aug 2018 13:33        Urinalysis Basic - ( 22 Aug 2018 17:26 )    Color: Yellow / Appearance: Slightly Turbid / S.010 / pH: x  Gluc: x / Ketone: Negative  / Bili: Negative / Urobili: Negative mg/dL   Blood: x / Protein: 30 mg/dL / Nitrite: Positive   Leuk Esterase: Moderate / RBC: 3-5 /HPF / WBC 11-25   Sq Epi: x / Non Sq Epi: x / Bacteria: x      CAPILLARY BLOOD GLUCOSE        CARDIAC MARKERS ( 23 Aug 2018 07:01 )  .049 ng/mL / x     / x     / x     / x      CARDIAC MARKERS ( 22 Aug 2018 23:42 )  .082 ng/mL / x     / x     / x     / x      CARDIAC MARKERS ( 22 Aug 2018 16:57 )  .136 ng/mL / x     / 697 U/L / x     / 3.9 ng/mL      Cultures  Culture Results:   No growth ( @ 22:15)  Culture Results:   No growth to date. ( @ 21:30)  Culture Results:   No growth to date. ( @ 21:30)        Culture - Urine (collected 18 @ 22:15)  Source: .Urine Clean Catch (Midstream)  Final Report (18 @ 18:15):    No growth    Culture - Blood (collected 18 @ 21:30)  Source: .Blood Blood  Preliminary Report (18 @ 22:01):    No growth to date.    Culture - Blood (collected 18 @ 21:30)  Source: .Blood Blood  Preliminary Report (18 @ 22:01):    No growth to date.        RADIOLOGY & ADDITIONAL TESTS:    Imaging Personally Reviewed:  [ ] YES  [ ] NO    Consultant(s) Notes Reviewed:  [ ] YES  [ ] NO    Care Discussed with Consultants/Other Providers [ ] YES  [ ] NO

## 2018-08-24 NOTE — DISCHARGE NOTE ADULT - PLAN OF CARE
wnl. likely demand ischemia, HTN.  trended down. ceftin 500mg bid for 4 days.  consider urology if persistent hematuria. c/w atenolo 25mg daily . pt has it home. avoid nephrotoxic meds.   check bmp prn. replete K.  needs repeat bmp in 3-5days. w/u as an out pt. w/u as an out pt.  c/w home eye drops.  f/u ophthalmology as an out pt. w/u as an out pt.  c/w home eye drops.  message left to PMD' service.  f/u ophthalmology as an out pt.

## 2018-08-24 NOTE — PROGRESS NOTE ADULT - PROBLEM SELECTOR PLAN 4
possibly acute on chronic, no available records, patient denies awareness of any kidney problems, will repeat after overnight IVF hydration, avoid nephrotoxic meds.
possibly acute on chronic, no available records, patient denies awareness of any resolved..

## 2018-08-24 NOTE — DISCHARGE NOTE ADULT - PATIENT PORTAL LINK FT
You can access the [facility] Patient Portal, offered by [facility], by registering with the following website: [email or web address]

## 2018-08-24 NOTE — PROGRESS NOTE ADULT - ASSESSMENT
The patient is a 78 year old male with a history of HTN, left eye blindness who is admitted with urosepsis.    Plan:  - ECG with no evidence of ischemia or infarction  - Troponin mildly elevated at 0.136 and trended down. Elevated from demand ischemia in the setting of infection and hypotension.  - Continue IVF as needed  - On ceftriaxone  - Hold atenolol  - Continue aspirin
The patient is a 78 year old male with a history of HTN, left eye blindness who is admitted with urosepsis.    Plan:  - ECG with no evidence of ischemia or infarction  - Troponin mildly elevated at 0.136 and trended down. Elevated from demand ischemia in the setting of infection and hypotension.  - On ceftriaxone  - Ventricular bigeminy and atrial run on telemetry - resume atenolol. Echocardiogram with normal LV systolic function, no significant valve issues.  - Continue aspirin
77 y/o M with PMH of HTN, and Glaucoma s/p left eye blindness, who presented with vomiting, fever & chills, with UTI and elevated troponin.
77 y/o M with PMH of HTN, and Glaucoma s/p left eye blindness, who presented with vomiting, fever & chills, with UTI and elevated troponin.

## 2018-08-24 NOTE — DISCHARGE NOTE ADULT - SECONDARY DIAGNOSIS.
Acute cystitis with hematuria Essential hypertension CKD (chronic kidney disease) stage 2, GFR 60-89 ml/min Hypokalemia Blind left eye Normocytic anemia

## 2018-08-24 NOTE — DISCHARGE NOTE ADULT - HOSPITAL COURSE
79 y/o M with PMH of HTN, and Glaucoma s/p left eye blindness, who presented with vomiting, fever & chills, with UTI and elevated troponin. pt was started on rocephin. on ceftin now.  urin culture no growth.    CT RENAL STONE LOPEZ     Mild left hydronephrosis without visible obstructing stone.    < from: US Transthoracic Echocardiogram w/Doppler Complete (08.23.18 @ 16:50) >  Conclusion:  1. Somewhat limited study.  2.Possible borderline left ventricular concentric hypertrophy with a   well-preserved ejection fraction as described above.  3. Otherwise this study is probably grossly within normal limits for   patient's stated age as described above.  4. Correlate clinically.

## 2018-08-24 NOTE — CHART NOTE - NSCHARTNOTEFT_GEN_A_CORE
Called by RN for abnormal fast rhythm in the 140's while patient is asleep, telemetry strip reviewed, shows a short paroxysm of A. Fib, vitals are stable, patient is being followed by cardiology.

## 2018-08-24 NOTE — DISCHARGE NOTE ADULT - MEDICATION SUMMARY - MEDICATIONS TO TAKE
I will START or STAY ON the medications listed below when I get home from the hospital:    aspirin 81 mg oral tablet  -- 1 tab(s) by mouth once a day  -- Indication: For Essential hypertension    atenolol 25 mg oral tablet  -- 1 tab(s) by mouth once a day  -- Indication: For Essential hypertension    cefuroxime 500 mg oral tablet  -- 1 tab(s) by mouth every 12 hours  -- Indication: For Acute cystitis with hematuria    dorzolamide-timolol 2%-0.5% preservative-free ophthalmic solution  -- 2 drop(s) to each affected eye   -- Indication: For glucoma    latanoprost 0.005% ophthalmic solution  -- 1 drop(s) to each affected eye once a day  -- Indication: For glucoma

## 2018-08-24 NOTE — PROGRESS NOTE ADULT - PROBLEM SELECTOR PLAN 6
IMPROVE VTE Individual Risk Assessment          RISK                                                          Points    [  ] Previous VTE                                                3  [  ] Thrombophilia                                             2  [  ] Lower limb paralysis                                    2        (unable to hold up >15 seconds)    [  ] Current Cancer                                             2         (within 6 months)  [ x ] Immobilization > 24 hrs        (expected)        1  [  ] ICU/CCU stay > 24 hours                            1  [ x ] Age > 60                                                    1    IMPROVE VTE Score 2.    ** Patient is at moderate to high risk for VTE, IMPROVE score of 2 in addition to the other risk factors not included in this scoring system, started him on UFH 5000 units subcutaneous every 8 hours for DVT prophylaxis.
IMPROVE VTE Individual Risk Assessment          RISK                                                          Points    [  ] Previous VTE                                                3  [  ] Thrombophilia                                             2  [  ] Lower limb paralysis                                    2        (unable to hold up >15 seconds)    [  ] Current Cancer                                             2         (within 6 months)  [ x ] Immobilization > 24 hrs        (expected)        1  [  ] ICU/CCU stay > 24 hours                            1  [ x ] Age > 60                                                    1    IMPROVE VTE Score 2.    ** Patient is at moderate to high risk for VTE, IMPROVE score of 2 in addition to the other risk factors not included in this scoring system, started him on UFH 5000 units subcutaneous every 8 hours for DVT prophylaxis.

## 2018-08-24 NOTE — PROGRESS NOTE ADULT - PROBLEM SELECTOR PLAN 1
acute cystitis with hematuria.  iv rocephin. CAT +left mild hydronephrosis without obstructing stone., cystitis.
acute cystitis with hematuria.  CAT +left mild hydronephrosis without obstructing stone., cystitis. po ceftin.

## 2018-08-25 ENCOUNTER — INPATIENT (INPATIENT)
Facility: HOSPITAL | Age: 78
LOS: 2 days | Discharge: HOME | DRG: 690 | End: 2018-08-28
Attending: INTERNAL MEDICINE | Admitting: INTERNAL MEDICINE
Payer: MEDICARE

## 2018-08-25 VITALS
HEART RATE: 53 BPM | WEIGHT: 169.98 LBS | TEMPERATURE: 98 F | HEIGHT: 65 IN | DIASTOLIC BLOOD PRESSURE: 90 MMHG | OXYGEN SATURATION: 99 % | SYSTOLIC BLOOD PRESSURE: 154 MMHG | RESPIRATION RATE: 18 BRPM

## 2018-08-25 DIAGNOSIS — Z83.3 FAMILY HISTORY OF DIABETES MELLITUS: ICD-10-CM

## 2018-08-25 DIAGNOSIS — Z98.41 CATARACT EXTRACTION STATUS, RIGHT EYE: Chronic | ICD-10-CM

## 2018-08-25 LAB
-  CANDIDA ALBICANS: SIGNIFICANT CHANGE UP
-  CANDIDA GLABRATA: SIGNIFICANT CHANGE UP
-  CANDIDA KRUSEI: SIGNIFICANT CHANGE UP
-  CANDIDA PARAPSILOSIS: SIGNIFICANT CHANGE UP
-  CANDIDA TROPICALIS: SIGNIFICANT CHANGE UP
-  COAGULASE NEGATIVE STAPHYLOCOCCUS: SIGNIFICANT CHANGE UP
-  K. PNEUMONIAE GROUP: SIGNIFICANT CHANGE UP
-  KPC RESISTANCE GENE: SIGNIFICANT CHANGE UP
-  STREPTOCOCCUS SP. (NOT GRP A, B OR S PNEUMONIAE): SIGNIFICANT CHANGE UP
A BAUMANNII DNA SPEC QL NAA+PROBE: SIGNIFICANT CHANGE UP
ALBUMIN SERPL ELPH-MCNC: 3.3 G/DL — SIGNIFICANT CHANGE UP (ref 3.3–5)
ALP SERPL-CCNC: 156 U/L — HIGH (ref 30–120)
ALT FLD-CCNC: 85 U/L DA — HIGH (ref 10–60)
ANION GAP SERPL CALC-SCNC: 10 MMOL/L — SIGNIFICANT CHANGE UP (ref 5–17)
APPEARANCE UR: CLEAR — SIGNIFICANT CHANGE UP
AST SERPL-CCNC: 97 U/L — HIGH (ref 10–40)
BASOPHILS # BLD AUTO: 0.03 K/UL — SIGNIFICANT CHANGE UP (ref 0–0.2)
BASOPHILS NFR BLD AUTO: 0.6 % — SIGNIFICANT CHANGE UP (ref 0–2)
BILIRUB SERPL-MCNC: 0.4 MG/DL — SIGNIFICANT CHANGE UP (ref 0.2–1.2)
BILIRUB UR-MCNC: NEGATIVE — SIGNIFICANT CHANGE UP
BUN SERPL-MCNC: 14 MG/DL — SIGNIFICANT CHANGE UP (ref 7–23)
CALCIUM SERPL-MCNC: 8.8 MG/DL — SIGNIFICANT CHANGE UP (ref 8.4–10.5)
CHLORIDE SERPL-SCNC: 104 MMOL/L — SIGNIFICANT CHANGE UP (ref 96–108)
CO2 SERPL-SCNC: 25 MMOL/L — SIGNIFICANT CHANGE UP (ref 22–31)
COLOR SPEC: YELLOW — SIGNIFICANT CHANGE UP
CREAT SERPL-MCNC: 1.15 MG/DL — SIGNIFICANT CHANGE UP (ref 0.5–1.3)
DIFF PNL FLD: ABNORMAL
E CLOAC COMP DNA BLD POS QL NAA+PROBE: SIGNIFICANT CHANGE UP
E COLI DNA BLD POS QL NAA+NON-PROBE: SIGNIFICANT CHANGE UP
ENTEROCOC DNA BLD POS QL NAA+NON-PROBE: SIGNIFICANT CHANGE UP
ENTEROCOC DNA BLD POS QL NAA+NON-PROBE: SIGNIFICANT CHANGE UP
EOSINOPHIL # BLD AUTO: 0.24 K/UL — SIGNIFICANT CHANGE UP (ref 0–0.5)
EOSINOPHIL NFR BLD AUTO: 4.7 % — SIGNIFICANT CHANGE UP (ref 0–6)
GLUCOSE SERPL-MCNC: 110 MG/DL — HIGH (ref 70–99)
GLUCOSE UR QL: NEGATIVE MG/DL — SIGNIFICANT CHANGE UP
GP B STREP DNA BLD POS QL NAA+NON-PROBE: SIGNIFICANT CHANGE UP
GRAM STN FLD: SIGNIFICANT CHANGE UP
HAEM INFLU DNA BLD POS QL NAA+NON-PROBE: SIGNIFICANT CHANGE UP
HCT VFR BLD CALC: 36.8 % — LOW (ref 39–50)
HGB BLD-MCNC: 12.1 G/DL — LOW (ref 13–17)
IMM GRANULOCYTES NFR BLD AUTO: 1 % — SIGNIFICANT CHANGE UP (ref 0–1.5)
K OXYTOCA DNA BLD POS QL NAA+NON-PROBE: SIGNIFICANT CHANGE UP
KETONES UR-MCNC: NEGATIVE — SIGNIFICANT CHANGE UP
L MONOCYTOG DNA BLD POS QL NAA+NON-PROBE: SIGNIFICANT CHANGE UP
LACTATE SERPL-SCNC: 0.8 MMOL/L — SIGNIFICANT CHANGE UP (ref 0.7–2)
LEUKOCYTE ESTERASE UR-ACNC: ABNORMAL
LYMPHOCYTES # BLD AUTO: 1.91 K/UL — SIGNIFICANT CHANGE UP (ref 1–3.3)
LYMPHOCYTES # BLD AUTO: 37.1 % — SIGNIFICANT CHANGE UP (ref 13–44)
MCHC RBC-ENTMCNC: 28.6 PG — SIGNIFICANT CHANGE UP (ref 27–34)
MCHC RBC-ENTMCNC: 32.9 GM/DL — SIGNIFICANT CHANGE UP (ref 32–36)
MCV RBC AUTO: 87 FL — SIGNIFICANT CHANGE UP (ref 80–100)
METHOD TYPE: SIGNIFICANT CHANGE UP
MONOCYTES # BLD AUTO: 0.67 K/UL — SIGNIFICANT CHANGE UP (ref 0–0.9)
MONOCYTES NFR BLD AUTO: 13 % — SIGNIFICANT CHANGE UP (ref 2–14)
MRSA SPEC QL CULT: SIGNIFICANT CHANGE UP
MSSA DNA SPEC QL NAA+PROBE: SIGNIFICANT CHANGE UP
N MEN ISLT CULT: SIGNIFICANT CHANGE UP
NEUTROPHILS # BLD AUTO: 2.25 K/UL — SIGNIFICANT CHANGE UP (ref 1.8–7.4)
NEUTROPHILS NFR BLD AUTO: 43.6 % — SIGNIFICANT CHANGE UP (ref 43–77)
NITRITE UR-MCNC: NEGATIVE — SIGNIFICANT CHANGE UP
P AERUGINOSA DNA BLD POS NAA+NON-PROBE: SIGNIFICANT CHANGE UP
PH UR: 6.5 — SIGNIFICANT CHANGE UP (ref 5–8)
PLATELET # BLD AUTO: 171 K/UL — SIGNIFICANT CHANGE UP (ref 150–400)
POTASSIUM SERPL-MCNC: 3.4 MMOL/L — LOW (ref 3.5–5.3)
POTASSIUM SERPL-SCNC: 3.4 MMOL/L — LOW (ref 3.5–5.3)
PROT SERPL-MCNC: 7.2 G/DL — SIGNIFICANT CHANGE UP (ref 6–8.3)
PROT UR-MCNC: 15 MG/DL
RBC # BLD: 4.23 M/UL — SIGNIFICANT CHANGE UP (ref 4.2–5.8)
RBC # FLD: 13.1 % — SIGNIFICANT CHANGE UP (ref 10.3–14.5)
S MARCESCENS DNA BLD POS NAA+NON-PROBE: SIGNIFICANT CHANGE UP
S PNEUM DNA BLD POS QL NAA+NON-PROBE: SIGNIFICANT CHANGE UP
S PYO DNA BLD POS QL NAA+NON-PROBE: SIGNIFICANT CHANGE UP
SODIUM SERPL-SCNC: 139 MMOL/L — SIGNIFICANT CHANGE UP (ref 135–145)
SP GR SPEC: 1 — LOW (ref 1.01–1.02)
UROBILINOGEN FLD QL: NEGATIVE MG/DL — SIGNIFICANT CHANGE UP
WBC # BLD: 5.15 K/UL — SIGNIFICANT CHANGE UP (ref 3.8–10.5)
WBC # FLD AUTO: 5.15 K/UL — SIGNIFICANT CHANGE UP (ref 3.8–10.5)

## 2018-08-25 RX ORDER — LATANOPROST 0.05 MG/ML
1 SOLUTION/ DROPS OPHTHALMIC; TOPICAL AT BEDTIME
Qty: 0 | Refills: 0 | Status: DISCONTINUED | OUTPATIENT
Start: 2018-08-25 | End: 2018-08-28

## 2018-08-25 RX ORDER — SODIUM CHLORIDE 9 MG/ML
1000 INJECTION INTRAMUSCULAR; INTRAVENOUS; SUBCUTANEOUS
Qty: 0 | Refills: 0 | Status: DISCONTINUED | OUTPATIENT
Start: 2018-08-25 | End: 2018-08-26

## 2018-08-25 RX ORDER — ASPIRIN/CALCIUM CARB/MAGNESIUM 324 MG
81 TABLET ORAL DAILY
Qty: 0 | Refills: 0 | Status: DISCONTINUED | OUTPATIENT
Start: 2018-08-25 | End: 2018-08-28

## 2018-08-25 RX ORDER — PIPERACILLIN AND TAZOBACTAM 4; .5 G/20ML; G/20ML
3.38 INJECTION, POWDER, LYOPHILIZED, FOR SOLUTION INTRAVENOUS EVERY 8 HOURS
Qty: 0 | Refills: 0 | Status: DISCONTINUED | OUTPATIENT
Start: 2018-08-25 | End: 2018-08-28

## 2018-08-25 RX ORDER — DORZOLAMIDE HYDROCHLORIDE TIMOLOL MALEATE 20; 5 MG/ML; MG/ML
2 SOLUTION/ DROPS OPHTHALMIC
Qty: 0 | Refills: 0 | Status: DISCONTINUED | OUTPATIENT
Start: 2018-08-25 | End: 2018-08-28

## 2018-08-25 RX ORDER — ATENOLOL 25 MG/1
25 TABLET ORAL DAILY
Qty: 0 | Refills: 0 | Status: DISCONTINUED | OUTPATIENT
Start: 2018-08-25 | End: 2018-08-28

## 2018-08-25 RX ORDER — SODIUM CHLORIDE 9 MG/ML
1000 INJECTION INTRAMUSCULAR; INTRAVENOUS; SUBCUTANEOUS ONCE
Qty: 0 | Refills: 0 | Status: COMPLETED | OUTPATIENT
Start: 2018-08-25 | End: 2018-08-25

## 2018-08-25 RX ORDER — ENOXAPARIN SODIUM 100 MG/ML
40 INJECTION SUBCUTANEOUS EVERY 24 HOURS
Qty: 0 | Refills: 0 | Status: DISCONTINUED | OUTPATIENT
Start: 2018-08-25 | End: 2018-08-28

## 2018-08-25 RX ORDER — PIPERACILLIN AND TAZOBACTAM 4; .5 G/20ML; G/20ML
3.38 INJECTION, POWDER, LYOPHILIZED, FOR SOLUTION INTRAVENOUS EVERY 12 HOURS
Qty: 0 | Refills: 0 | Status: DISCONTINUED | OUTPATIENT
Start: 2018-08-25 | End: 2018-08-25

## 2018-08-25 RX ORDER — ASPIRIN/CALCIUM CARB/MAGNESIUM 324 MG
1 TABLET ORAL
Qty: 0 | Refills: 0 | COMMUNITY

## 2018-08-25 RX ADMIN — SODIUM CHLORIDE 1000 MILLILITER(S): 9 INJECTION INTRAMUSCULAR; INTRAVENOUS; SUBCUTANEOUS at 22:21

## 2018-08-25 RX ADMIN — SODIUM CHLORIDE 1000 MILLILITER(S): 9 INJECTION INTRAMUSCULAR; INTRAVENOUS; SUBCUTANEOUS at 21:21

## 2018-08-25 RX ADMIN — PIPERACILLIN AND TAZOBACTAM 25 GRAM(S): 4; .5 INJECTION, POWDER, LYOPHILIZED, FOR SOLUTION INTRAVENOUS at 22:30

## 2018-08-25 NOTE — H&P ADULT - PROBLEM SELECTOR PLAN 1
Asymptomatic, no fever/chills, normal TLC & differential, normal lactate level, nothing to suggest sepsis, repeat cultures were obtained by ED team prior to admission, started patient on Zosyn 3.375 gm IVPB Q8 hours, will monitor and follow culture results, ID consult with Dr. Mejia was called.

## 2018-08-25 NOTE — H&P ADULT - PROBLEM SELECTOR PLAN 4
Controlled, will continue Atenolol with hold parameters, unless proven to be related to elevated hepatic enzymes.

## 2018-08-25 NOTE — ED PROVIDER NOTE - OBJECTIVE STATEMENT
77 y/o pt presents to the ED for eval after receiving an abnormal lab result since today. Pt was admitted to the hospital for a couple of days, was sent home yesterday. Had blood cultures done. Reports feeling fine, eating well. Denies pain, fever. No further complaints at this time. 77 y/o pt presents to the ED for admission after receiving an abnormal lab result since today. Pt was admitted to the hospital for a couple of days, was sent home yesterday. Had blood cultures done. Reports feeling fine, eating well. Denies pain, fever. No further complaints at this time. 77 y/o pt presents to the ED for admission after receiving an abnormal lab result today. Pt was admitted to the hospital for a couple of days, was sent home yesterday. Had blood cultures done. Reports feeling fine, eating well. Denies pain, fever. No further complaints at this time.

## 2018-08-25 NOTE — H&P ADULT - PROBLEM SELECTOR PLAN 6
IMPROVE VTE Individual Risk Assessment          RISK                                                          Points  [  ] Previous VTE                                                3  [  ] Thrombophilia                                             2  [  ] Lower limb paralysis                                    2        (unable to hold up >15 seconds)    [  ] Current Cancer                                             2         (within 6 months)  [ x ] Immobilization > 24 hrs      (expected)          1  [  ] ICU/CCU stay > 24 hours                            1  [ x ] Age > 60                                                    1    IMPROVE VTE Score 2.    ** Patient is at moderate to high risk for VTE, IMPROVE score of 2 in addition to the other risk factors not included in this scoring system, started on LMWH 40 mg sub Q daily for DVT prophylaxis.

## 2018-08-25 NOTE — ED PROVIDER NOTE - MEDICAL DECISION MAKING DETAILS
pt recently admitted for urosepsis, called back for +blood culture gr neg rods in Anaerobic bottle - to be admitted to hospitalist service

## 2018-08-25 NOTE — H&P ADULT - ASSESSMENT
79 y/o M with PMH of HTN, and Glaucoma s/p left eye blindness, called back for positive blood culture.

## 2018-08-25 NOTE — H&P ADULT - NSHPLABSRESULTS_GEN_ALL_CORE
-        139  |  104  |  14  ----------------------------<  110<H>  3.4<L>   |  25  |  1.15    Ca    8.8      25 Aug 2018 21:22    TPro  7.2  /  Alb  3.3  /  TBili  0.4  /  DBili  x   /  AST  97<H>  /  ALT  85<H>  /  AlkPhos  156<H>                            12.1   5.15  )-----------( 171      ( 25 Aug 2018 21:22 )             36.8         LIVER FUNCTIONS - ( 25 Aug 2018 21:22 )  Alb: 3.3 g/dL / Pro: 7.2 g/dL / ALK PHOS: 156 U/L / ALT: 85 U/L DA / AST: 97 U/L / GGT: x             Urinalysis Basic - ( 25 Aug 2018 21:22 )    Color: Yellow / Appearance: Clear / S.005 / pH: x  Gluc: x / Ketone: Negative  / Bili: Negative / Urobili: Negative mg/dL   Blood: x / Protein: 15 mg/dL / Nitrite: Negative   Leuk Esterase: Moderate / RBC: 0-2 /HPF / WBC 11-25   Sq Epi: x / Non Sq Epi: Few / Bacteria: Few        Culture - Blood (18 @ 21:30)    Specimen Source: .Blood Blood    Culture Results:   No growth to date.      Culture - Blood (18 @ 21:30)    -  Multidrug (KPC pos) resistant organism: Nondet    -  Staphylococcus aureus: Nondet    -  Methicillin resistant Staphylococcus aureus (MRSA): Nondet    -  Coagulase negative Staphylococcus: Nondet    -  Enterococcus species: Nondet    -  Vancomycin resistant Enterococcus sp.: Nondet    -  Escherichia coli: Nondet    -  Klebsiella oxytoca: Nondet    -  Klebsiella pneumoniae: Nondet    -  Serratia marcescens: Nondet    -  Haemophilus influenzae: Nondet    -  Listeria monocytogenes: Nondet    -  Neisseria meningitidis: Nondet    -  Pseudomonas aeruginosa: Nondet    -  Acinetobacter baumanii: Nondet    -  Enterobacter cloacae complex: Nondet    -  Streptococcus sp. (Not Grp A, B or S pneumoniae): Nondet    -  Streptococcus agalactiae (Group B): Nondet    -  Streptococcus pyogenes (Group A): Nondet    -  Streptococcus pneumoniae: Nondet    -  Candida albicans: Nondet    -  Candida glabrata: Nondet    -  Candida krusei: Nondet    -  Candida parapsilosis: Nondet    -  Candida tropicalis: Nondet    Gram Stain:   Growth in aerobic bottle: Gram Negative Rods    Specimen Source: .Blood Blood    Organism: Blood Culture PCR    Culture Results:   Growth in aerobic bottle: Gram Negative Rods  "Due to technical problems, Proteus sp. will Not be reported as part of  the BCID panel until further notice"  ***Blood Panel PCR results on this specimen are available  approximately 3 hours after the Gram stain result.***  Gram stain, PCR, and/or culture results may not always  correspond due to difference in methodologies.  ************************************************************  This PCR assay was performed using [employer].  The following targets are tested for: Enterococcus,  vancomycin resistant enterococci, Listeria monocytogenes,  coagulase negative staphylococci, S. aureus,  methicillin resistant S. aureus, Streptococcus agalactiae  (Group B), S. pneumoniae, S. pyogenes (Group A),  Acinetobacter baumannii, Enterobacter cloacae, E. coli,  Klebsiella oxytoca, K. pneumoniae, Proteus sp.,  Serratia marcescens, Haemophilus influenzae,  Neisseria meningitidis, Pseudomonas aeruginosa, Candida  albicans, C. glabrata, C krusei, C parapsilosis,  C. tropicalis and the KPC resistance gene.    Organism Identification: Blood Culture PCR    Method Type: PCR

## 2018-08-25 NOTE — H&P ADULT - PROBLEM SELECTOR PLAN 3
Were normal 3 days ago as per hospital records, possibly drug induced as patient was just started on Atenolol & Ceftin, both are known for such side effect, already stopped Ceftin, will continue Atenolol for now while monitoring Lfts, if proven to be the culprit will consider changing the medication.

## 2018-08-26 DIAGNOSIS — E87.6 HYPOKALEMIA: ICD-10-CM

## 2018-08-26 DIAGNOSIS — R94.5 ABNORMAL RESULTS OF LIVER FUNCTION STUDIES: ICD-10-CM

## 2018-08-26 DIAGNOSIS — Z29.9 ENCOUNTER FOR PROPHYLACTIC MEASURES, UNSPECIFIED: ICD-10-CM

## 2018-08-26 DIAGNOSIS — I10 ESSENTIAL (PRIMARY) HYPERTENSION: ICD-10-CM

## 2018-08-26 DIAGNOSIS — H40.9 UNSPECIFIED GLAUCOMA: ICD-10-CM

## 2018-08-26 DIAGNOSIS — R78.81 BACTEREMIA: ICD-10-CM

## 2018-08-26 PROBLEM — H54.40 BLINDNESS, ONE EYE, UNSPECIFIED EYE: Chronic | Status: ACTIVE | Noted: 2018-08-22

## 2018-08-26 LAB
-  CANDIDA ALBICANS: SIGNIFICANT CHANGE UP
-  CANDIDA GLABRATA: SIGNIFICANT CHANGE UP
-  CANDIDA KRUSEI: SIGNIFICANT CHANGE UP
-  CANDIDA PARAPSILOSIS: SIGNIFICANT CHANGE UP
-  CANDIDA TROPICALIS: SIGNIFICANT CHANGE UP
-  COAGULASE NEGATIVE STAPHYLOCOCCUS: SIGNIFICANT CHANGE UP
-  K. PNEUMONIAE GROUP: SIGNIFICANT CHANGE UP
-  KPC RESISTANCE GENE: SIGNIFICANT CHANGE UP
-  STREPTOCOCCUS SP. (NOT GRP A, B OR S PNEUMONIAE): SIGNIFICANT CHANGE UP
A BAUMANNII DNA SPEC QL NAA+PROBE: SIGNIFICANT CHANGE UP
ALBUMIN SERPL ELPH-MCNC: 2.9 G/DL — LOW (ref 3.3–5)
ALP SERPL-CCNC: 132 U/L — HIGH (ref 30–120)
ALT FLD-CCNC: 96 U/L DA — HIGH (ref 10–60)
ANION GAP SERPL CALC-SCNC: 6 MMOL/L — SIGNIFICANT CHANGE UP (ref 5–17)
AST SERPL-CCNC: 101 U/L — HIGH (ref 10–40)
BASOPHILS # BLD AUTO: 0.02 K/UL — SIGNIFICANT CHANGE UP (ref 0–0.2)
BASOPHILS NFR BLD AUTO: 0.4 % — SIGNIFICANT CHANGE UP (ref 0–2)
BILIRUB SERPL-MCNC: 0.6 MG/DL — SIGNIFICANT CHANGE UP (ref 0.2–1.2)
BUN SERPL-MCNC: 12 MG/DL — SIGNIFICANT CHANGE UP (ref 7–23)
CALCIUM SERPL-MCNC: 8.7 MG/DL — SIGNIFICANT CHANGE UP (ref 8.4–10.5)
CHLORIDE SERPL-SCNC: 109 MMOL/L — HIGH (ref 96–108)
CO2 SERPL-SCNC: 26 MMOL/L — SIGNIFICANT CHANGE UP (ref 22–31)
CREAT SERPL-MCNC: 0.89 MG/DL — SIGNIFICANT CHANGE UP (ref 0.5–1.3)
E CLOAC COMP DNA BLD POS QL NAA+PROBE: SIGNIFICANT CHANGE UP
E COLI DNA BLD POS QL NAA+NON-PROBE: SIGNIFICANT CHANGE UP
ENTEROCOC DNA BLD POS QL NAA+NON-PROBE: SIGNIFICANT CHANGE UP
ENTEROCOC DNA BLD POS QL NAA+NON-PROBE: SIGNIFICANT CHANGE UP
EOSINOPHIL # BLD AUTO: 0.25 K/UL — SIGNIFICANT CHANGE UP (ref 0–0.5)
EOSINOPHIL NFR BLD AUTO: 4.9 % — SIGNIFICANT CHANGE UP (ref 0–6)
GLUCOSE SERPL-MCNC: 91 MG/DL — SIGNIFICANT CHANGE UP (ref 70–99)
GP B STREP DNA BLD POS QL NAA+NON-PROBE: SIGNIFICANT CHANGE UP
GRAM STN FLD: SIGNIFICANT CHANGE UP
GRAM STN FLD: SIGNIFICANT CHANGE UP
HAEM INFLU DNA BLD POS QL NAA+NON-PROBE: SIGNIFICANT CHANGE UP
HCT VFR BLD CALC: 36.6 % — LOW (ref 39–50)
HGB BLD-MCNC: 12.1 G/DL — LOW (ref 13–17)
IMM GRANULOCYTES NFR BLD AUTO: 1.4 % — SIGNIFICANT CHANGE UP (ref 0–1.5)
K OXYTOCA DNA BLD POS QL NAA+NON-PROBE: SIGNIFICANT CHANGE UP
L MONOCYTOG DNA BLD POS QL NAA+NON-PROBE: SIGNIFICANT CHANGE UP
LYMPHOCYTES # BLD AUTO: 1.65 K/UL — SIGNIFICANT CHANGE UP (ref 1–3.3)
LYMPHOCYTES # BLD AUTO: 32.2 % — SIGNIFICANT CHANGE UP (ref 13–44)
MCHC RBC-ENTMCNC: 28.7 PG — SIGNIFICANT CHANGE UP (ref 27–34)
MCHC RBC-ENTMCNC: 33.1 GM/DL — SIGNIFICANT CHANGE UP (ref 32–36)
MCV RBC AUTO: 86.7 FL — SIGNIFICANT CHANGE UP (ref 80–100)
METHOD TYPE: SIGNIFICANT CHANGE UP
MONOCYTES # BLD AUTO: 0.6 K/UL — SIGNIFICANT CHANGE UP (ref 0–0.9)
MONOCYTES NFR BLD AUTO: 11.7 % — SIGNIFICANT CHANGE UP (ref 2–14)
MRSA SPEC QL CULT: SIGNIFICANT CHANGE UP
MSSA DNA SPEC QL NAA+PROBE: SIGNIFICANT CHANGE UP
N MEN ISLT CULT: SIGNIFICANT CHANGE UP
NEUTROPHILS # BLD AUTO: 2.53 K/UL — SIGNIFICANT CHANGE UP (ref 1.8–7.4)
NEUTROPHILS NFR BLD AUTO: 49.4 % — SIGNIFICANT CHANGE UP (ref 43–77)
P AERUGINOSA DNA BLD POS NAA+NON-PROBE: SIGNIFICANT CHANGE UP
PLATELET # BLD AUTO: 164 K/UL — SIGNIFICANT CHANGE UP (ref 150–400)
POTASSIUM SERPL-MCNC: 4.3 MMOL/L — SIGNIFICANT CHANGE UP (ref 3.5–5.3)
POTASSIUM SERPL-SCNC: 4.3 MMOL/L — SIGNIFICANT CHANGE UP (ref 3.5–5.3)
PROT SERPL-MCNC: 6.4 G/DL — SIGNIFICANT CHANGE UP (ref 6–8.3)
RBC # BLD: 4.22 M/UL — SIGNIFICANT CHANGE UP (ref 4.2–5.8)
RBC # FLD: 13.1 % — SIGNIFICANT CHANGE UP (ref 10.3–14.5)
S MARCESCENS DNA BLD POS NAA+NON-PROBE: SIGNIFICANT CHANGE UP
S PNEUM DNA BLD POS QL NAA+NON-PROBE: SIGNIFICANT CHANGE UP
S PYO DNA BLD POS QL NAA+NON-PROBE: SIGNIFICANT CHANGE UP
SODIUM SERPL-SCNC: 141 MMOL/L — SIGNIFICANT CHANGE UP (ref 135–145)
WBC # BLD: 5.12 K/UL — SIGNIFICANT CHANGE UP (ref 3.8–10.5)
WBC # FLD AUTO: 5.12 K/UL — SIGNIFICANT CHANGE UP (ref 3.8–10.5)

## 2018-08-26 RX ORDER — POTASSIUM CHLORIDE 20 MEQ
40 PACKET (EA) ORAL EVERY 4 HOURS
Qty: 0 | Refills: 0 | Status: COMPLETED | OUTPATIENT
Start: 2018-08-26 | End: 2018-08-26

## 2018-08-26 RX ADMIN — DORZOLAMIDE HYDROCHLORIDE TIMOLOL MALEATE 2 DROP(S): 20; 5 SOLUTION/ DROPS OPHTHALMIC at 21:46

## 2018-08-26 RX ADMIN — Medication 81 MILLIGRAM(S): at 11:21

## 2018-08-26 RX ADMIN — LATANOPROST 1 DROP(S): 0.05 SOLUTION/ DROPS OPHTHALMIC; TOPICAL at 21:46

## 2018-08-26 RX ADMIN — Medication 40 MILLIEQUIVALENT(S): at 00:23

## 2018-08-26 RX ADMIN — Medication 40 MILLIEQUIVALENT(S): at 04:10

## 2018-08-26 RX ADMIN — DORZOLAMIDE HYDROCHLORIDE TIMOLOL MALEATE 2 DROP(S): 20; 5 SOLUTION/ DROPS OPHTHALMIC at 06:00

## 2018-08-26 RX ADMIN — SODIUM CHLORIDE 125 MILLILITER(S): 9 INJECTION INTRAMUSCULAR; INTRAVENOUS; SUBCUTANEOUS at 00:09

## 2018-08-26 RX ADMIN — PIPERACILLIN AND TAZOBACTAM 25 GRAM(S): 4; .5 INJECTION, POWDER, LYOPHILIZED, FOR SOLUTION INTRAVENOUS at 06:00

## 2018-08-26 RX ADMIN — ATENOLOL 25 MILLIGRAM(S): 25 TABLET ORAL at 06:01

## 2018-08-26 RX ADMIN — PIPERACILLIN AND TAZOBACTAM 25 GRAM(S): 4; .5 INJECTION, POWDER, LYOPHILIZED, FOR SOLUTION INTRAVENOUS at 14:33

## 2018-08-26 RX ADMIN — PIPERACILLIN AND TAZOBACTAM 25 GRAM(S): 4; .5 INJECTION, POWDER, LYOPHILIZED, FOR SOLUTION INTRAVENOUS at 21:47

## 2018-08-26 RX ADMIN — ENOXAPARIN SODIUM 40 MILLIGRAM(S): 100 INJECTION SUBCUTANEOUS at 00:09

## 2018-08-26 NOTE — PROGRESS NOTE ADULT - ASSESSMENT
77 y/o M with PMH of HTN, and Glaucoma s/p left eye blindness, called back for positive blood culture.

## 2018-08-26 NOTE — CONSULT NOTE ADULT - SUBJECTIVE AND OBJECTIVE BOX
HPI:  This is a 77 y/o M with PMH of HTN, and Glaucoma s/p left eye blindness, who was just  discharged from [facility] 8/24/18 after being admitted for 2 days with UTI, sent  home on oral ceftin, called to come back to the hospital as his blood cultures grew GNR.   Patient states he feels fine, denies any symptoms. Denies fever chills n/v/d. Of note his ucx  last admission was negative. However pt had fever chills with dysuria at that time. CT a/p  showed cystitis. Pt has no abd pain.     Infectious Disease consult was called to evaluate pt and for antibiotic management.    Past Medical & Surgical Hx:  PAST MEDICAL & SURGICAL HISTORY:  Blind left eye  Hypertension  History of right cataract extraction      Social History--  EtOH: denies   Tobacco: denies   Drug Use: denies       FAMILY HISTORY:  Family history of diabetes mellitus (Sibling)    No Known Allergies    Home Medications:  aspirin 81 mg oral tablet: 1 tab(s) orally once a day (25 Aug 2018 20:38)  atenolol 25 mg oral tablet: 1 tab(s) orally once a day (25 Aug 2018 20:38)  dorzolamide-timolol 2%-0.5% preservative-free ophthalmic solution: 2 drop(s) to each affected eye  (25 Aug 2018 20:38)  latanoprost 0.005% ophthalmic solution: 1 drop(s) to each affected eye once a day (25 Aug 2018 20:38)      Current Inpatient Medications :    ANTIBIOTICS:   piperacillin/tazobactam IVPB. 3.375 Gram(s) IV Intermittent every 8 hours      OTHER RELEVANT MEDICATIONS :  aspirin enteric coated 81 milliGRAM(s) Oral daily  ATENolol  Tablet 25 milliGRAM(s) Oral daily  dorzolamide 2%/timolol 0.5% Ophthalmic Solution 2 Drop(s) Both EYES two times a day  enoxaparin Injectable 40 milliGRAM(s) SubCutaneous every 24 hours  latanoprost 0.005% Ophthalmic Solution 1 Drop(s) Both EYES at bedtime    ROS:  CONSTITUTIONAL:  Negative fever or chills, feels well, good appetite  EYES:  Negative  blurry vision or double vision  CARDIOVASCULAR:  Negative for chest pain or palpitations  RESPIRATORY:  Negative for cough, wheezing, or SOB   GASTROINTESTINAL:  Negative for nausea, vomiting, diarrhea, constipation, or abdominal pain  GENITOURINARY:  Negative frequency, urgency , dysuria or hematuria   NEUROLOGIC:  No headache, confusion, dizziness, lightheadedness  All other systems were reviewed and are negative      I&O's Detail    25 Aug 2018 07:01  -  26 Aug 2018 07:00  --------------------------------------------------------  IN:  Total IN: 0 mL    OUT:    Voided: 800 mL  Total OUT: 800 mL    Total NET: -800 mL      26 Aug 2018 07:01  -  26 Aug 2018 13:41  --------------------------------------------------------  IN:    sodium chloride 0.9%: 375 mL  Total IN: 375 mL    OUT:  Total OUT: 0 mL    Total NET: 375 mL    Physical Exam:  Vital Signs Last 24 Hrs  T(C): 36.7 (26 Aug 2018 09:29), Max: 37.3 (26 Aug 2018 05:33)  T(F): 98.1 (26 Aug 2018 09:29), Max: 99.1 (26 Aug 2018 05:33)  HR: 81 (26 Aug 2018 09:29) (47 - 81)  BP: 141/64 (26 Aug 2018 09:29) (141/64 - 176/84)  BP(mean): 97 (26 Aug 2018 05:33) (97 - 100)  RR: 18 (26 Aug 2018 09:29) (16 - 18)  SpO2: 96% (26 Aug 2018 09:29) (96% - 99%)  Height (cm): 165.1 (08-25 @ 20:32)  Weight (kg): 77.1 (08-25 @ 20:32)  BMI (kg/m2): 28.3 (08-25 @ 20:32)  BSA (m2): 1.85 (08-25 @ 20:32)    General: in no acute distress  Eyes: sclera anicteric, pupils equal and reactive to light  ENMT: buccal mucosa moist, pharynx not injected  Neck: supple, trachea midline  Lungs: clear, no wheeze/rhonchi  Cardiovascular: regular rate and rhythm, S1 S2  Abdomen: soft, nontender, no organomegaly present, bowel sounds normal  Neurological:  alert and oriented x3, Cranial Nerves II-XII grossly intact  Skin:no increased ecchymosis/petechiae/purpura  Lymph Nodes: no palpable cervical/supraclavicular lymph nodes enlargements  Extremities: no cyanosis/clubbing/edema    Labs:                         12.1   5.12  )-----------( 164      ( 26 Aug 2018 07:38 )             36.6   08-26    141  |  109<H>  |  12  ----------------------------<  91  4.3   |  26  |  0.89    Ca    8.7      26 Aug 2018 07:38    TPro  6.4  /  Alb  2.9<L>  /  TBili  0.6  /  DBili  x   /  AST  101<H>  /  ALT  96<H>  /  AlkPhos  132<H>  08-26      RECENT CULTURES:  Culture - Urine (08.22.18 @ 22:15)    Specimen Source: .Urine Clean Catch (Midstream)    Culture Results:   No growth    Culture - Blood (08.22.18 @ 21:30)    -  Multidrug (KPC pos) resistant organism: Nondet    -  Staphylococcus aureus: Nondet    -  Methicillin resistant Staphylococcus aureus (MRSA): Nondet    -  Coagulase negative Staphylococcus: Nondet    -  Enterococcus species: Nondet    -  Vancomycin resistant Enterococcus sp.: Nondet    -  Escherichia coli: Nondet    -  Klebsiella oxytoca: Nondet    -  Klebsiella pneumoniae: Nondet    -  Serratia marcescens: Nondet    -  Haemophilus influenzae: Nondet    -  Listeria monocytogenes: Nondet    -  Neisseria meningitidis: Nondet    -  Pseudomonas aeruginosa: Nondet    -  Acinetobacter baumanii: Nondet    -  Enterobacter cloacae complex: Nondet    -  Streptococcus sp. (Not Grp A, B or S pneumoniae): Nondet    -  Streptococcus agalactiae (Group B): Nondet    -  Streptococcus pyogenes (Group A): Nondet    -  Streptococcus pneumoniae: Nondet    -  Candida albicans: Nondet    -  Candida glabrata: Nondet    -  Candida krusei: Nondet    -  Candida parapsilosis: Nondet    -  Candida tropicalis: Nondet    Gram Stain:   Growth in aerobic bottle: Gram Negative Rods    Specimen Source: .Blood Blood    Organism: Blood Culture PCR    Culture Results:   Growth in aerobic bottle: Gram Negative Rods  "Due to technical problems, Proteus sp. will Not be reported as part of  the BCID panel until further notice"  ***Blood Panel PCR results on this specimen are available  approximately 3 hours after the Gram stain result.***  Gram stain, PCR, and/or culture results may not always  correspond due to difference in methodologies.  ************************************************************  This PCR assay was performed using [employer].  The following targets are tested for: Enterococcus,  vancomycin resistant enterococci, Listeria monocytogenes,  coagulase negative staphylococci, S. aureus,  methicillin resistant S. aureus, Streptococcus agalactiae  (Group B), S. pneumoniae, S. pyogenes (Group A),  Acinetobacter baumannii, Enterobacter cloacae, E. coli,  Klebsiella oxytoca, K. pneumoniae, Proteus sp.,  Serratia marcescens, Haemophilus influenzae,  Neisseria meningitidis, Pseudomonas aeruginosa, Candida  albicans, C. glabrata, C krusei, C parapsilosis,  C. tropicalis and the KPC resistance gene.    Organism Identification: Blood Culture PCR    Method Type: PCR    RADIOLOGY & ADDITIONAL STUDIES:  EXAM:  CT RENAL STONE HUNT                                  PROCEDURE DATE:  08/22/2018          INTERPRETATION:  CLINICAL INFORMATION: Flank pain    COMPARISON: None    PROCEDURE:   CT of the Abdomen and Pelvis was performed without intravenous contrast.   Intravenous contrast: None.  Oral contrast: None.  Sagittal and coronal reformats were performed.    FINDINGS:    LOWER CHEST: Calcified left pleural plaques.    LIVER: Within normal limits.  BILE DUCTS: Normal caliber.  GALLBLADDER: Within normal limits.  SPLEEN: Within normal limits.  PANCREAS: Within normal limits.  ADRENALS: Within normal limits.  KIDNEYS/URETERS/BLADDER: Mild left-sided hydronephrosis. No   nephroureterolithiasis. Right renal collecting system within normal   limits. Diffuse urinary bladder wall thickening with multiple bladder   diverticula, the largest seen to the posterior and right measuring 3.8 x   3.8 cm.  REPRODUCTIVE ORGANS: Mild prostatomegaly.     BOWEL: No bowel obstruction. Appendix normal.  PERITONEUM: No ascites.  VESSELS:  Within normal limits.  RETROPERITONEUM: No lymphadenopathy.    ABDOMINAL WALL: Within normal limits.  BONES: Degenerative changes of the spine.    IMPRESSION: Bladder wall thickening and inflammation suggesting cystitis.   Urinalysis correlation is recommended.     Mild left hydronephrosis without visible obstructing stone.      Assessment :   79 YO M admitted with GNR bacteremia likely sec UTI  Clinically stable    Plan :   Zosyn for now  Fu repeat blood cultures  Stable from Id standpoint      Continue with present regime .  Approptiate use of antibiotics and adverse effects reviewed.      I have discussed the above plan of care with patient/family in detail. They expressed understanding of the treatment plan . Risks, benefits and alternatives discussed in detail. I have asked if they have any questions or concerns and appropriately addressed them to the best of my ability .      > 45 minutes spent in direct patient care reviewing  the notes, lab data/ imaging , discussion with multidisciplinary team. All questions were addressed and answered to the best of my capacity .    Thank you for allowing me to participate in the care of your patient .      Michael Dubois MD  Infectious Disease  [phone]

## 2018-08-26 NOTE — PROGRESS NOTE ADULT - PROBLEM SELECTOR PLAN 1
Asymptomatic, no fever/chills, normal TLC & differential, normal lactate level, nothing to suggest sepsis, repeat cultures were obtained by ED team prior to admission, started patient on Zosyn 3.375 gm IVPB Q8 hours, will monitor and follow culture results, ID consult with Dr. Dubois was called.  Still has WBC in urine. Recent CT showed cystitis.

## 2018-08-26 NOTE — PROGRESS NOTE ADULT - SUBJECTIVE AND OBJECTIVE BOX
Patient is a 78y old  Male who presents with a chief complaint of readmitted for gram negative rods in blood.      INTERVAL History of Present Illness/OVERNIGHT EVENTS: afebrile.  sepsis work up drawn in ER .  new transaminitis.  feels well.    MEDICATIONS  (STANDING):  aspirin enteric coated 81 milliGRAM(s) Oral daily  ATENolol  Tablet 25 milliGRAM(s) Oral daily  dorzolamide 2%/timolol 0.5% Ophthalmic Solution 2 Drop(s) Both EYES two times a day  enoxaparin Injectable 40 milliGRAM(s) SubCutaneous every 24 hours  latanoprost 0.005% Ophthalmic Solution 1 Drop(s) Both EYES at bedtime  piperacillin/tazobactam IVPB. 3.375 Gram(s) IV Intermittent every 8 hours    MEDICATIONS  (PRN):      Allergies    No Known Allergies    Intolerances        REVIEW OF SYSTEMS:  Negative unless otherwise specified above.    Vital Signs Last 24 Hrs  T(C): 36.7 (26 Aug 2018 09:29), Max: 37.3 (26 Aug 2018 05:33)  T(F): 98.1 (26 Aug 2018 09:29), Max: 99.1 (26 Aug 2018 05:33)  HR: 81 (26 Aug 2018 09:29) (47 - 81)  BP: 141/64 (26 Aug 2018 09:29) (141/64 - 176/84)  BP(mean): 97 (26 Aug 2018 05:33) (97 - 100)  RR: 18 (26 Aug 2018 09:29) (16 - 18)  SpO2: 96% (26 Aug 2018 09:29) (96% - 99%)    Height (cm): 165.1 ( @ 20:32)  Weight (kg): 77.1 ( @ 20:32)  BMI (kg/m2): 28.3 ( @ 20:32)  BSA (m2): 1.85 ( @ 20:32)    PHYSICAL EXAM:  GENERAL: No apparent distress  HEAD:  Atraumatic, Normocephalic  EYES: conjunctiva and sclera clear  ENMT: Moist mucous membranes  NECK: Supple  CHEST/LUNG: Clear to auscultation bilaterally  HEART: Regular rate and rhythm  ABDOMEN: Soft, Nontender, Nondistended; Bowel sounds present  EXTREMITIES:  No clubbing, cyanosis or edema  SKIN: No rashes or lesions  NERVOUS SYSTEM:  Alert & Oriented X3; Bilateral Lower extremity mobile, sensation to light touch intact      LABS:                        12.1   5.12  )-----------( 164      ( 26 Aug 2018 07:38 )             36.6     26 Aug 2018 07:38    141    |  109    |  12     ----------------------------<  91     4.3     |  26     |  0.89     Ca    8.7        26 Aug 2018 07:38    TPro  6.4    /  Alb  2.9    /  TBili  0.6    /  DBili  x      /  AST  101    /  ALT  96     /  AlkPhos  132    26 Aug 2018 07:38      Urinalysis Basic - ( 25 Aug 2018 21:22 )    Color: Yellow / Appearance: Clear / S.005 / pH: x  Gluc: x / Ketone: Negative  / Bili: Negative / Urobili: Negative mg/dL   Blood: x / Protein: 15 mg/dL / Nitrite: Negative   Leuk Esterase: Moderate / RBC: 0-2 /HPF / WBC 11-25   Sq Epi: x / Non Sq Epi: Few / Bacteria: Few      CAPILLARY BLOOD GLUCOSE          RADIOLOGY & ADDITIONAL TESTS:    Images reviewed personally    Consultant Notes Reviewed and Care Discussed with relevant Consultants/Other Providers.

## 2018-08-26 NOTE — PROGRESS NOTE ADULT - PROBLEM SELECTOR PLAN 3
Were normal 3 days ago as per hospital records, possibly drug induced as patient was just started on Atenolol & Ceftin, both are known for such side effect, already stopped Ceftin, will continue Atenolol for now while monitoring Lfts, if proven to be the culprit will consider changing the medication.  f/up GI recs

## 2018-08-27 DIAGNOSIS — N18.3 CHRONIC KIDNEY DISEASE, STAGE 3 (MODERATE): ICD-10-CM

## 2018-08-27 LAB
ALBUMIN SERPL ELPH-MCNC: 3.3 G/DL — SIGNIFICANT CHANGE UP (ref 3.3–5)
ALP SERPL-CCNC: 153 U/L — HIGH (ref 30–120)
ALT FLD-CCNC: 102 U/L DA — HIGH (ref 10–60)
ANION GAP SERPL CALC-SCNC: 4 MMOL/L — LOW (ref 5–17)
AST SERPL-CCNC: 51 U/L — HIGH (ref 10–40)
BILIRUB SERPL-MCNC: 0.5 MG/DL — SIGNIFICANT CHANGE UP (ref 0.2–1.2)
BUN SERPL-MCNC: 14 MG/DL — SIGNIFICANT CHANGE UP (ref 7–23)
CALCIUM SERPL-MCNC: 9.1 MG/DL — SIGNIFICANT CHANGE UP (ref 8.4–10.5)
CHLORIDE SERPL-SCNC: 102 MMOL/L — SIGNIFICANT CHANGE UP (ref 96–108)
CO2 SERPL-SCNC: 28 MMOL/L — SIGNIFICANT CHANGE UP (ref 22–31)
CREAT SERPL-MCNC: 1.12 MG/DL — SIGNIFICANT CHANGE UP (ref 0.5–1.3)
CULTURE RESULTS: NO GROWTH — SIGNIFICANT CHANGE UP
GLUCOSE SERPL-MCNC: 112 MG/DL — HIGH (ref 70–99)
HAV IGM SER-ACNC: SIGNIFICANT CHANGE UP
HBV CORE IGM SER-ACNC: SIGNIFICANT CHANGE UP
HBV SURFACE AG SER-ACNC: SIGNIFICANT CHANGE UP
HCV AB S/CO SERPL IA: 0.21 S/CO — SIGNIFICANT CHANGE UP
HCV AB SERPL-IMP: SIGNIFICANT CHANGE UP
POTASSIUM SERPL-MCNC: 3.9 MMOL/L — SIGNIFICANT CHANGE UP (ref 3.5–5.3)
POTASSIUM SERPL-SCNC: 3.9 MMOL/L — SIGNIFICANT CHANGE UP (ref 3.5–5.3)
PROCALCITONIN SERPL-MCNC: 2.17 NG/ML — HIGH (ref 0.02–0.1)
PROT SERPL-MCNC: 7.2 G/DL — SIGNIFICANT CHANGE UP (ref 6–8.3)
SODIUM SERPL-SCNC: 134 MMOL/L — LOW (ref 135–145)
SPECIMEN SOURCE: SIGNIFICANT CHANGE UP

## 2018-08-27 RX ADMIN — DORZOLAMIDE HYDROCHLORIDE TIMOLOL MALEATE 2 DROP(S): 20; 5 SOLUTION/ DROPS OPHTHALMIC at 05:41

## 2018-08-27 RX ADMIN — PIPERACILLIN AND TAZOBACTAM 25 GRAM(S): 4; .5 INJECTION, POWDER, LYOPHILIZED, FOR SOLUTION INTRAVENOUS at 05:41

## 2018-08-27 RX ADMIN — DORZOLAMIDE HYDROCHLORIDE TIMOLOL MALEATE 2 DROP(S): 20; 5 SOLUTION/ DROPS OPHTHALMIC at 21:36

## 2018-08-27 RX ADMIN — LATANOPROST 1 DROP(S): 0.05 SOLUTION/ DROPS OPHTHALMIC; TOPICAL at 21:29

## 2018-08-27 RX ADMIN — ENOXAPARIN SODIUM 40 MILLIGRAM(S): 100 INJECTION SUBCUTANEOUS at 00:30

## 2018-08-27 RX ADMIN — ATENOLOL 25 MILLIGRAM(S): 25 TABLET ORAL at 05:41

## 2018-08-27 RX ADMIN — PIPERACILLIN AND TAZOBACTAM 25 GRAM(S): 4; .5 INJECTION, POWDER, LYOPHILIZED, FOR SOLUTION INTRAVENOUS at 21:30

## 2018-08-27 RX ADMIN — Medication 81 MILLIGRAM(S): at 11:17

## 2018-08-27 RX ADMIN — PIPERACILLIN AND TAZOBACTAM 25 GRAM(S): 4; .5 INJECTION, POWDER, LYOPHILIZED, FOR SOLUTION INTRAVENOUS at 14:52

## 2018-08-27 NOTE — PROGRESS NOTE ADULT - SUBJECTIVE AND OBJECTIVE BOX
Patient is a 78y old  Male who presents with a chief complaint of N/V,fever,chills,dysuria,chestpain. (26 Aug 2018 01:49)      INTERVAL History of Present Illness/OVERNIGHT EVENTS: feels well    MEDICATIONS  (STANDING):  aspirin enteric coated 81 milliGRAM(s) Oral daily  ATENolol  Tablet 25 milliGRAM(s) Oral daily  dorzolamide 2%/timolol 0.5% Ophthalmic Solution 2 Drop(s) Both EYES two times a day  enoxaparin Injectable 40 milliGRAM(s) SubCutaneous every 24 hours  latanoprost 0.005% Ophthalmic Solution 1 Drop(s) Both EYES at bedtime  piperacillin/tazobactam IVPB. 3.375 Gram(s) IV Intermittent every 8 hours    MEDICATIONS  (PRN):      Allergies    No Known Allergies    Intolerances        REVIEW OF SYSTEMS:  Negative unless otherwise specified above.    Vital Signs Last 24 Hrs  T(C): 36.8 (27 Aug 2018 09:29), Max: 37.1 (27 Aug 2018 05:35)  T(F): 98.2 (27 Aug 2018 09:29), Max: 98.8 (27 Aug 2018 05:35)  HR: 54 (27 Aug 2018 09:29) (52 - 91)  BP: 132/69 (27 Aug 2018 09:29) (132/69 - 170/81)  BP(mean): 110 (27 Aug 2018 05:35) (105 - 110)  RR: 18 (27 Aug 2018 09:29) (14 - 18)  SpO2: 97% (27 Aug 2018 09:29) (97% - 98%)        PHYSICAL EXAM:  GENERAL: No apparent distress  HEAD:  Atraumatic, Normocephalic  EYES: conjunctiva and sclera clear  ENMT: Moist mucous membranes  NECK: Supple  CHEST/LUNG: Clear to auscultation bilaterally  HEART: Regular rate and rhythm  ABDOMEN: Soft, Nontender, Nondistended; Bowel sounds present  EXTREMITIES:  No clubbing, cyanosis or edema  SKIN: No rashes or lesions  NERVOUS SYSTEM:  Alert & Oriented X3; Bilateral Lower extremity mobile, sensation to light touch intact      LABS:    27 Aug 2018 11:48    134    |  102    |  14     ----------------------------<  112    3.9     |  28     |  1.12     Ca    9.1        27 Aug 2018 11:48    TPro  7.2    /  Alb  3.3    /  TBili  0.5    /  DBili  x      /  AST  51     /  ALT  102    /  AlkPhos  153    27 Aug 2018 11:48      Urinalysis Basic - ( 25 Aug 2018 21:22 )    Color: Yellow / Appearance: Clear / S.005 / pH: x  Gluc: x / Ketone: Negative  / Bili: Negative / Urobili: Negative mg/dL   Blood: x / Protein: 15 mg/dL / Nitrite: Negative   Leuk Esterase: Moderate / RBC: 0-2 /HPF / WBC 11-25   Sq Epi: x / Non Sq Epi: Few / Bacteria: Few      CAPILLARY BLOOD GLUCOSE          RADIOLOGY & ADDITIONAL TESTS:    Images reviewed personally    Consultant Notes Reviewed and Care Discussed with relevant Consultants/Other Providers.

## 2018-08-27 NOTE — PROGRESS NOTE ADULT - SUBJECTIVE AND OBJECTIVE BOX
TERRY MITCHELL is a 78yMale , patient examined and chart reviewed. Patient seen and examined today being followed for       INTERVAL HPI/ OVERNIGHT EVENTS:     PAST MEDICAL & SURGICAL HISTORY:  Blind left eye  Hypertension  History of right cataract extraction      For details regarding the patient's social history, family history, and other miscellaneous elements, please refer the initial infectious diseases consultation and/or the admitting history and physical examination for this admission.    ROS:  Unable to obtain due to :     ROS:  CONSTITUTIONAL:  Negative fever or chills, feels well, good appetite  EYES:  Negative  blurry vision or double vision  CARDIOVASCULAR:  Negative for chest pain or palpitations  RESPIRATORY:  Negative for cough, wheezing, or SOB   GASTROINTESTINAL:  Negative for nausea, vomiting, diarrhea, constipation, or abdominal pain  GENITOURINARY:  Negative frequency, urgency or dysuria  NEUROLOGIC:  No headache, confusion, dizziness, lightheadedness  All other systems were reviewed and are negative         Current inpatient medications :    ANTIBIOTICS/RELEVANT:  piperacillin/tazobactam IVPB. 3.375 Gram(s) IV Intermittent every 8 hours      aspirin enteric coated 81 milliGRAM(s) Oral daily  ATENolol  Tablet 25 milliGRAM(s) Oral daily  dorzolamide 2%/timolol 0.5% Ophthalmic Solution 2 Drop(s) Both EYES two times a day  enoxaparin Injectable 40 milliGRAM(s) SubCutaneous every 24 hours  latanoprost 0.005% Ophthalmic Solution 1 Drop(s) Both EYES at bedtime      Objective:     @ : @ 07:00  --------------------------------------------------------  IN: 635 mL / OUT: 400 mL / NET: 235 mL     @ : @ 11:53  --------------------------------------------------------  IN: 0 mL / OUT: 250 mL / NET: -250 mL      T(C): 36.8 (18 @ 09:29), Max: 37.1 (18 @ 05:35)  HR: 54 (18 @ 09:29) (52 - 91)  BP: 132/69 (18 @ 09:29) (132/69 - 170/81)  RR: 18 (18 @ 09:29) (14 - 18)  SpO2: 97% (18 @ 09:29) (97% - 98%)  Wt(kg): --      Physical Exam:  General: well developed well nourished, in no acute distress  Eyes: sclera anicteric, pupils equal and reactive to light  ENMT: buccal mucosa moist, pharynx not injected  Neck: supple, trachea midline  Lungs: clear, no wheeze/rhonchi  Cardiovascular: regular rate and rhythm, S1 S2  Abdomen: soft, nontender, no organomegaly present, bowel sounds normal  Neurological: alert and oriented x3, Cranial Nerves II-XII grossly intact  Skin: no increased ecchymosis/petechiae/purpura  Lymph Nodes: no palpable cervical/supraclavicular lymph nodes enlargements  Extremities: no cyanosis/clubbing/edema            LABS:                          12.1   5.12  )-----------( 164      ( 26 Aug 2018 07:38 )             36.6           141  |  109<H>  |  12  ----------------------------<  91  4.3   |  26  |  0.89    Ca    8.7      26 Aug 2018 07:38    TPro  6.4  /  Alb  2.9<L>  /  TBili  0.6  /  DBili  x   /  AST  101<H>  /  ALT  96<H>  /  AlkPhos  132<H>          Urinalysis Basic - ( 25 Aug 2018 21:22 )    Color: Yellow / Appearance: Clear / S.005 / pH: x  Gluc: x / Ketone: Negative  / Bili: Negative / Urobili: Negative mg/dL   Blood: x / Protein: 15 mg/dL / Nitrite: Negative   Leuk Esterase: Moderate / RBC: 0-2 /HPF / WBC 11-25   Sq Epi: x / Non Sq Epi: Few / Bacteria: Few      MICROBIOLOGY:    Culture - Urine (collected 26 Aug 2018 11:27)  Source: .Urine Clean Catch (Midstream)  Final Report (27 Aug 2018 08:39):    No growth    Culture - Blood (18 @ 21:30)    Gram Stain:   Growth in anaerobic bottle: Gram Positive Rods    Specimen Source: .Blood Blood    Culture Results:   Growth in anaerobic bottle: Gram Positive Rods        Culture - Blood (18 @ 21:30)    -  Multidrug (KPC pos) resistant organism: Nondet    -  Multidrug (KPC pos) resistant organism: Nondet    -  Staphylococcus aureus: Nondet    -  Staphylococcus aureus: Nondet    -  Methicillin resistant Staphylococcus aureus (MRSA): Nondet    -  Methicillin resistant Staphylococcus aureus (MRSA): Nondet    -  Coagulase negative Staphylococcus: Nondet    -  Coagulase negative Staphylococcus: Nondet    -  Enterococcus species: Nondet    -  Enterococcus species: Nondet    -  Vancomycin resistant Enterococcus sp.: Nondet    -  Vancomycin resistant Enterococcus sp.: Nondet    -  Escherichia coli: Nondet    -  Escherichia coli: Nondet    -  Klebsiella oxytoca: Nondet    -  Klebsiella oxytoca: Nondet    -  Klebsiella pneumoniae: Nondet    -  Klebsiella pneumoniae: Nondet    -  Serratia marcescens: Nondet    -  Serratia marcescens: Nondet    -  Haemophilus influenzae: Nondet    -  Haemophilus influenzae: Nondet    -  Listeria monocytogenes: Nondet    -  Listeria monocytogenes: Nondet    -  Neisseria meningitidis: Nondet    -  Neisseria meningitidis: Nondet    -  Pseudomonas aeruginosa: Nondet    -  Pseudomonas aeruginosa: Nondet    -  Acinetobacter baumanii: Nondet    -  Acinetobacter baumanii: Nondet    -  Enterobacter cloacae complex: Nondet    -  Enterobacter cloacae complex: Nondet    -  Streptococcus sp. (Not Grp A, B or S pneumoniae): Nondet    -  Streptococcus sp. (Not Grp A, B or S pneumoniae): Nondet    -  Streptococcus agalactiae (Group B): Nondet    -  Streptococcus agalactiae (Group B): Nondet    -  Streptococcus pyogenes (Group A): Nondet    -  Streptococcus pyogenes (Group A): Nondet    -  Streptococcus pneumoniae: Nondet    -  Streptococcus pneumoniae: Nondet    -  Candida albicans: Nondet    -  Candida albicans: Nondet    -  Candida glabrata: Nondet    -  Candida glabrata: Nondet    -  Candida krusei: Nondet    -  Candida krusei: Nondet    -  Candida parapsilosis: Nondet    -  Candida parapsilosis: Nondet    -  Candida tropicalis: Nondet    -  Candida tropicalis: Nondet    Gram Stain:   Growth in aerobic bottle: Gram Negative Rods  Growth in anaerobic bottle: Gram Negative Coccobacilli    Specimen Source: .Blood Blood    Organism: Blood Culture PCR    Organism: Blood Culture PCR    Culture Results:   Growth in aerobic bottle: Gram Negative Rods  Growth in anaerobic bottle: Gram Negative Coccobacilli  "Due to technical problems, Proteus sp. will Not be reported as part of  the BCID panel until further notice"  ***Blood Panel PCR results on this specimen are available  approximately 3 hours after the Gram stain result.***  Gram stain, PCR, and/or culture results may not always  correspond due to difference in methodologies.  ************************************************************  This PCR assay was performed using [employer].  The following targets are tested for: Enterococcus,  vancomycin resistant enterococci, Listeria monocytogenes,  coagulase negative staphylococci, S. aureus,  methicillin resistant S. aureus, Streptococcus agalactiae  (Group B), S. pneumoniae, S. pyogenes (Group A),  Acinetobacter baumannii, Enterobacter cloacae, E. coli,  Klebsiella oxytoca, K. pneumoniae, Proteus sp.,  Serratia marcescens, Haemophilus influenzae,  Neisseria meningitidis, Pseudomonas aeruginosa, Candida  albicans, C. glabrata, C krusei, C parapsilosis,  C. tropicalis and the KPC resistance gene.    Organism Identification: Blood Culture PCR  Blood Culture PCR    Method Type: PCR    Method Type: PCR    RADIOLOGY & ADDITIONAL STUDIES:    EXAM:  US ABDOMEN LIMITED                            PROCEDURE DATE:  2018        INTERPRETATION:  RIGHT UPPER QUADRANT ULTRASOUND    CLINICAL INFORMATION:  Abnormal LFTs.    COMPARISON: CT scan abdomen pelvis 2018.    PROCEDURE: Real time imaging of the right upper abdomen were performed   by technologist and made available for review.    FINDINGS:      No focal hepatic mass or intrahepatic biliary dilatation is noted.  The gallbladder is visualized without evidence of shadowing gallstones.   No gallbladder wall thickening or pericholecystic fluid is noted.    The visualized common bile duct is normal in caliber measuring up to 5 mm.    The pancreas is not adequately visualized.    The right kidney measures 9 cm in length; no hydronephrosis is noted.    The visualized abdominal aorta and inferior vena cava appear unremarkable.    Impression:    Unremarkable right upper quadrant ultrasound examination.      EXAM:  CT RENAL STONE HUNT                            PROCEDURE DATE:  2018      INTERPRETATION:  CLINICAL INFORMATION: Flank pain    COMPARISON: None    PROCEDURE:   CT of the Abdomen and Pelvis was performed without intravenous contrast.   Intravenous contrast: None.  Oral contrast: None.  Sagittal and coronal reformats were performed.    FINDINGS:    LOWER CHEST: Calcified left pleural plaques.    LIVER: Within normal limits.  BILE DUCTS: Normal caliber.  GALLBLADDER: Within normal limits.  SPLEEN: Within normal limits.  PANCREAS: Within normal limits.  ADRENALS: Within normal limits.  KIDNEYS/URETERS/BLADDER: Mild left-sided hydronephrosis. No   nephroureterolithiasis. Right renal collecting system within normal   limits. Diffuse urinary bladder wall thickening with multiple bladder   diverticula, the largest seen to the posterior and right measuring 3.8 x   3.8 cm.  REPRODUCTIVE ORGANS: Mild prostatomegaly.     BOWEL: No bowel obstruction. Appendix normal.  PERITONEUM: No ascites.  VESSELS:  Within normal limits.  RETROPERITONEUM: No lymphadenopathy.    ABDOMINAL WALL: Within normal limits.  BONES: Degenerative changes of the spine.    IMPRESSION: Bladder wall thickening and inflammation suggesting cystitis.   Urinalysis correlation is recommended.     Mild left hydronephrosis without visible obstructing stone.      Assessment :  79 YO M admitted with GNR bacteremia poss sec UTI/cystitis though Ucx ngtd  No GI symptoms  CT stone stiles noted with mild left hydro and cystitis  Clinically stable    Plan :   Zosyn for now  Fu blood cultures  May need renal ultrasound of left kidney to reassess hydro  Clinically doing well      Continue with present regiment.  Appropriate use of antibiotics and adverse effects reviewed.      I have discussed the above plan of care with patient/ family in detail. They expressed understanding of the  treatment plan . Risks, benefits and alternatives discussed in detail. I have asked if they have any questions or concerns and appropriately addressed them to the best of my ability .    > 35 minutes were spent in direct patient care reviewing notes, medications ,labs data/ imaging , discussion with multidisciplinary team.    Thank you for allowing me to participate in care of your patient .    Michael Dubois MD  Infectious Disease  [phone]

## 2018-08-27 NOTE — CONSULT NOTE ADULT - SUBJECTIVE AND OBJECTIVE BOX
Abnormal lft's in the setting of gram neg sepsis  ? reactive  r/o biliary/hepatic etiology   check sono   repeat lft's   check hep panel  continue antibiotics

## 2018-08-27 NOTE — PROGRESS NOTE ADULT - PROBLEM SELECTOR PLAN 1
Asymptomatic, no fever/chills, normal TLC & differential, normal lactate level, nothing to suggest sepsis, repeat cultures were obtained by ED team prior to admission, started patient on Zosyn 3.375 gm IVPB Q8 hours, will monitor and follow culture results, ID consult with Dr. Dubois was called.  Still has WBC in urine. Recent CT showed cystitis.  repeat cultures NGTD

## 2018-08-28 ENCOUNTER — TRANSCRIPTION ENCOUNTER (OUTPATIENT)
Age: 78
End: 2018-08-28

## 2018-08-28 VITALS
HEART RATE: 51 BPM | RESPIRATION RATE: 17 BRPM | TEMPERATURE: 98 F | OXYGEN SATURATION: 96 % | DIASTOLIC BLOOD PRESSURE: 82 MMHG | SYSTOLIC BLOOD PRESSURE: 128 MMHG

## 2018-08-28 DIAGNOSIS — N40.0 BENIGN PROSTATIC HYPERPLASIA WITHOUT LOWER URINARY TRACT SYMPTOMS: ICD-10-CM

## 2018-08-28 DIAGNOSIS — N40.1 BENIGN PROSTATIC HYPERPLASIA WITH LOWER URINARY TRACT SYMPTOMS: ICD-10-CM

## 2018-08-28 DIAGNOSIS — N32.3 DIVERTICULUM OF BLADDER: ICD-10-CM

## 2018-08-28 LAB
ALBUMIN SERPL ELPH-MCNC: 3.3 G/DL — SIGNIFICANT CHANGE UP (ref 3.3–5)
ALP SERPL-CCNC: 143 U/L — HIGH (ref 30–120)
ALT FLD-CCNC: 85 U/L DA — HIGH (ref 10–60)
ANION GAP SERPL CALC-SCNC: 5 MMOL/L — SIGNIFICANT CHANGE UP (ref 5–17)
ANISOCYTOSIS BLD QL: SLIGHT — SIGNIFICANT CHANGE UP
AST SERPL-CCNC: 33 U/L — SIGNIFICANT CHANGE UP (ref 10–40)
BASOPHILS # BLD AUTO: 0 K/UL — SIGNIFICANT CHANGE UP (ref 0–0.2)
BASOPHILS NFR BLD AUTO: 0 % — SIGNIFICANT CHANGE UP (ref 0–2)
BILIRUB SERPL-MCNC: 0.4 MG/DL — SIGNIFICANT CHANGE UP (ref 0.2–1.2)
BUN SERPL-MCNC: 16 MG/DL — SIGNIFICANT CHANGE UP (ref 7–23)
CALCIUM SERPL-MCNC: 9.3 MG/DL — SIGNIFICANT CHANGE UP (ref 8.4–10.5)
CHLORIDE SERPL-SCNC: 104 MMOL/L — SIGNIFICANT CHANGE UP (ref 96–108)
CO2 SERPL-SCNC: 29 MMOL/L — SIGNIFICANT CHANGE UP (ref 22–31)
CREAT SERPL-MCNC: 1.04 MG/DL — SIGNIFICANT CHANGE UP (ref 0.5–1.3)
EOSINOPHIL # BLD AUTO: 0.88 K/UL — HIGH (ref 0–0.5)
EOSINOPHIL NFR BLD AUTO: 15 % — HIGH (ref 0–6)
GLUCOSE SERPL-MCNC: 110 MG/DL — HIGH (ref 70–99)
HCT VFR BLD CALC: 39.3 % — SIGNIFICANT CHANGE UP (ref 39–50)
HGB BLD-MCNC: 13.2 G/DL — SIGNIFICANT CHANGE UP (ref 13–17)
LYMPHOCYTES # BLD AUTO: 2.12 K/UL — SIGNIFICANT CHANGE UP (ref 1–3.3)
LYMPHOCYTES # BLD AUTO: 36 % — SIGNIFICANT CHANGE UP (ref 13–44)
MANUAL SMEAR VERIFICATION: SIGNIFICANT CHANGE UP
MCHC RBC-ENTMCNC: 28.7 PG — SIGNIFICANT CHANGE UP (ref 27–34)
MCHC RBC-ENTMCNC: 33.6 GM/DL — SIGNIFICANT CHANGE UP (ref 32–36)
MCV RBC AUTO: 85.4 FL — SIGNIFICANT CHANGE UP (ref 80–100)
MONOCYTES # BLD AUTO: 0.35 K/UL — SIGNIFICANT CHANGE UP (ref 0–0.9)
MONOCYTES NFR BLD AUTO: 6 % — SIGNIFICANT CHANGE UP (ref 2–14)
MYELOCYTES NFR BLD: 1 % — HIGH (ref 0–0)
NEUTROPHILS # BLD AUTO: 2.47 K/UL — SIGNIFICANT CHANGE UP (ref 1.8–7.4)
NEUTROPHILS NFR BLD AUTO: 41 % — LOW (ref 43–77)
NEUTS BAND # BLD: 1 % — SIGNIFICANT CHANGE UP (ref 0–8)
NRBC # BLD: 0 — SIGNIFICANT CHANGE UP
NRBC # BLD: SIGNIFICANT CHANGE UP /100 WBCS (ref 0–0)
PLAT MORPH BLD: NORMAL — SIGNIFICANT CHANGE UP
PLATELET # BLD AUTO: 225 K/UL — SIGNIFICANT CHANGE UP (ref 150–400)
POIKILOCYTOSIS BLD QL AUTO: SLIGHT — SIGNIFICANT CHANGE UP
POTASSIUM SERPL-MCNC: 4.3 MMOL/L — SIGNIFICANT CHANGE UP (ref 3.5–5.3)
POTASSIUM SERPL-SCNC: 4.3 MMOL/L — SIGNIFICANT CHANGE UP (ref 3.5–5.3)
PROT SERPL-MCNC: 7.3 G/DL — SIGNIFICANT CHANGE UP (ref 6–8.3)
RBC # BLD: 4.6 M/UL — SIGNIFICANT CHANGE UP (ref 4.2–5.8)
RBC # FLD: 12.5 % — SIGNIFICANT CHANGE UP (ref 10.3–14.5)
RBC BLD AUTO: ABNORMAL
SODIUM SERPL-SCNC: 138 MMOL/L — SIGNIFICANT CHANGE UP (ref 135–145)
WBC # BLD: 5.88 K/UL — SIGNIFICANT CHANGE UP (ref 3.8–10.5)
WBC # FLD AUTO: 5.88 K/UL — SIGNIFICANT CHANGE UP (ref 3.8–10.5)

## 2018-08-28 RX ORDER — TAMSULOSIN HYDROCHLORIDE 0.4 MG/1
0.4 CAPSULE ORAL AT BEDTIME
Qty: 0 | Refills: 0 | Status: DISCONTINUED | OUTPATIENT
Start: 2018-08-28 | End: 2018-08-28

## 2018-08-28 RX ORDER — LACTOBACILLUS ACIDOPHILUS 100MM CELL
2 CAPSULE ORAL
Qty: 10 | Refills: 0 | OUTPATIENT
Start: 2018-08-28 | End: 2018-09-01

## 2018-08-28 RX ORDER — TAMSULOSIN HYDROCHLORIDE 0.4 MG/1
1 CAPSULE ORAL
Qty: 30 | Refills: 0 | OUTPATIENT
Start: 2018-08-28

## 2018-08-28 RX ADMIN — ATENOLOL 25 MILLIGRAM(S): 25 TABLET ORAL at 06:29

## 2018-08-28 RX ADMIN — PIPERACILLIN AND TAZOBACTAM 25 GRAM(S): 4; .5 INJECTION, POWDER, LYOPHILIZED, FOR SOLUTION INTRAVENOUS at 13:49

## 2018-08-28 RX ADMIN — ENOXAPARIN SODIUM 40 MILLIGRAM(S): 100 INJECTION SUBCUTANEOUS at 00:07

## 2018-08-28 RX ADMIN — Medication 81 MILLIGRAM(S): at 11:19

## 2018-08-28 RX ADMIN — PIPERACILLIN AND TAZOBACTAM 25 GRAM(S): 4; .5 INJECTION, POWDER, LYOPHILIZED, FOR SOLUTION INTRAVENOUS at 06:29

## 2018-08-28 RX ADMIN — DORZOLAMIDE HYDROCHLORIDE TIMOLOL MALEATE 2 DROP(S): 20; 5 SOLUTION/ DROPS OPHTHALMIC at 06:29

## 2018-08-28 NOTE — PROGRESS NOTE ADULT - SUBJECTIVE AND OBJECTIVE BOX
no acute events   vss  HEENT alert  LUNGS cta b/l  CVS s1s2 rrr  ABD soft benign pos bs  EXT no edema    labs   wbc 5  alk phos 153  ast 51  alt 102    impression abnormal lft's in the setting of sepsis   ast and alt trending down  sono negative for bilairy disease  conservative mgt   follow lft's daily  avoid hepatoxic medications

## 2018-08-28 NOTE — PROGRESS NOTE ADULT - SUBJECTIVE AND OBJECTIVE BOX
Patient is a 78y old  Male who presents with a chief complaint of N/V,fever,chills,dysuria,chestpain. (26 Aug 2018 01:49)      INTERVAL History of Present Illness/OVERNIGHT EVENTS: feels well.  I informed him about BPH and the need to see a urologist.    MEDICATIONS  (STANDING):  aspirin enteric coated 81 milliGRAM(s) Oral daily  ATENolol  Tablet 25 milliGRAM(s) Oral daily  dorzolamide 2%/timolol 0.5% Ophthalmic Solution 2 Drop(s) Both EYES two times a day  enoxaparin Injectable 40 milliGRAM(s) SubCutaneous every 24 hours  latanoprost 0.005% Ophthalmic Solution 1 Drop(s) Both EYES at bedtime  piperacillin/tazobactam IVPB. 3.375 Gram(s) IV Intermittent every 8 hours  tamsulosin 0.4 milliGRAM(s) Oral at bedtime    MEDICATIONS  (PRN):      Allergies    No Known Allergies    Intolerances        REVIEW OF SYSTEMS:  Negative unless otherwise specified above.    Vital Signs Last 24 Hrs  T(C): 36.7 (28 Aug 2018 09:20), Max: 37.2 (27 Aug 2018 17:10)  T(F): 98 (28 Aug 2018 09:20), Max: 99 (27 Aug 2018 17:10)  HR: 53 (28 Aug 2018 09:20) (52 - 70)  BP: 135/66 (28 Aug 2018 09:20) (127/71 - 156/67)  BP(mean): --  RR: 18 (28 Aug 2018 09:20) (16 - 18)  SpO2: 94% (28 Aug 2018 09:20) (94% - 96%)        PHYSICAL EXAM:  GENERAL: No apparent distress  HEAD:  Atraumatic, Normocephalic  EYES: conjunctiva and sclera clear  ENMT: Moist mucous membranes  NECK: Supple  CHEST/LUNG: Clear to auscultation bilaterally  HEART: Regular rate and rhythm  ABDOMEN: Soft, Nontender, Nondistended; Bowel sounds present  EXTREMITIES:  No clubbing, cyanosis or edema  SKIN: No rashes or lesions  NERVOUS SYSTEM:  Alert & Oriented X3; Bilateral Lower extremity mobile, sensation to light touch intact      LABS:                        13.2   5.88  )-----------( 225      ( 28 Aug 2018 06:43 )             39.3     28 Aug 2018 06:43    138    |  104    |  16     ----------------------------<  110    4.3     |  29     |  1.04     Ca    9.3        28 Aug 2018 06:43    TPro  7.3    /  Alb  3.3    /  TBili  0.4    /  DBili  x      /  AST  33     /  ALT  85     /  AlkPhos  143    28 Aug 2018 06:43        CAPILLARY BLOOD GLUCOSE          RADIOLOGY & ADDITIONAL TESTS:    Images reviewed personally    Consultant Notes Reviewed and Care Discussed with relevant Consultants/Other Providers.

## 2018-08-28 NOTE — CONSULT NOTE ADULT - SUBJECTIVE AND OBJECTIVE BOX
CHIEF COMPLAINT: Rule out UTI    HISTORY OF PRESENT ILLNESS:    The patient is a pleasant 78 year old male admitted due to bacteremia with possible UTI. He was initially admitted on 8/22/18 with fever and chills. Urine culture was negative at that time but minimal pyuria was noted on UA and he was treated for a presumptive UTI. He denied dysuria or change in urinary frequency or urgency. He was subsequently discharged and readmitted due to 2 positive blood cultures-1 with gram positive cocci and 1 with gram negative rods.  He has been afebrile with normal wbc since admission. CT scan w/o contrast demonstrated bladder wall thickening and multiple bladder diverticuli. He has no complaints at present.    PAST MEDICAL & SURGICAL HISTORY:  Blind left eye  Hypertension  History of right cataract extraction      REVIEW OF SYSTEMS:    CONSTITUTIONAL: No weakness, fevers or chills  EYES/ENT: No visual changes;  No vertigo or throat pain   NECK: No pain or stiffness  RESPIRATORY: No cough, wheezing, hemoptysis; No shortness of breath  CARDIOVASCULAR: No chest pain or palpitations  GASTROINTESTINAL: No abdominal or epigastric pain. No nausea, vomiting, or hematemesis; No diarrhea or constipation. No melena or hematochezia.  GENITOURINARY: No dysuria, frequency or hematuria  NEUROLOGICAL: No numbness or weakness  SKIN: No itching, burning, rashes, or lesions   All other review of systems is negative unless indicated above.    MEDICATIONS  (STANDING):  aspirin enteric coated 81 milliGRAM(s) Oral daily  ATENolol  Tablet 25 milliGRAM(s) Oral daily  dorzolamide 2%/timolol 0.5% Ophthalmic Solution 2 Drop(s) Both EYES two times a day  enoxaparin Injectable 40 milliGRAM(s) SubCutaneous every 24 hours  latanoprost 0.005% Ophthalmic Solution 1 Drop(s) Both EYES at bedtime  piperacillin/tazobactam IVPB. 3.375 Gram(s) IV Intermittent every 8 hours    MEDICATIONS  (PRN):      Allergies    No Known Allergies    Intolerances        SOCIAL HISTORY:    FAMILY HISTORY:  Family history of diabetes mellitus (Sibling)      Vital Signs Last 24 Hrs  T(C): 36.8 (28 Aug 2018 00:02), Max: 37.2 (27 Aug 2018 13:29)  T(F): 98.3 (28 Aug 2018 00:02), Max: 99 (27 Aug 2018 17:10)  HR: 56 (28 Aug 2018 00:02) (48 - 70)  BP: 127/71 (28 Aug 2018 00:02) (127/71 - 149/81)  BP(mean): --  RR: 18 (28 Aug 2018 00:02) (16 - 20)  SpO2: 94% (28 Aug 2018 00:02) (94% - 97%)    PHYSICAL EXAM:    Constitutional: NAD, well-developed  HEENT: TRACIE, EOMI, Normal Hearing, MMM  Neck: No LAD, No JVD  Back: Normal spine flexure, No CVA tenderness  Abd: BS+, soft, NT/ND, No CVAT  : Normal phallus,open meatus,bilateral descended testes, no masses  SARAH: Normal prostate, no masses  Extremities: No peripheral edema  Neurological: A/O x 3, no focal deficits  Psychiatric: Normal mood, normal affect    LABS:                        12.1   5.12  )-----------( 164      ( 26 Aug 2018 07:38 )             36.6     08-27    134<L>  |  102  |  14  ----------------------------<  112<H>  3.9   |  28  |  1.12    Ca    9.1      27 Aug 2018 11:48    TPro  7.2  /  Alb  3.3  /  TBili  0.5  /  DBili  x   /  AST  51<H>  /  ALT  102<H>  /  AlkPhos  153<H>  08-27        RADIOLOGY & ADDITIONAL STUDIES:    < from: CT Renal Stone Hun (08.22.18 @ 19:12) >    EXAM:  CT RENAL STONE HUNT                                  PROCEDURE DATE:  08/22/2018          INTERPRETATION:  CLINICAL INFORMATION: Flank pain    COMPARISON: None    PROCEDURE:   CT of the Abdomen and Pelvis was performed without intravenous contrast.   Intravenous contrast: None.  Oral contrast: None.  Sagittal and coronal reformats were performed.    FINDINGS:    LOWER CHEST: Calcified left pleural plaques.    LIVER: Within normal limits.  BILE DUCTS: Normal caliber.  GALLBLADDER: Within normal limits.  SPLEEN: Within normal limits.  PANCREAS: Within normal limits.  ADRENALS: Within normal limits.  KIDNEYS/URETERS/BLADDER: Mild left-sided hydronephrosis. No   nephroureterolithiasis. Right renal collecting system within normal   limits. Diffuse urinary bladder wall thickening with multiple bladder   diverticula, the largest seen to the posterior and right measuring 3.8 x   3.8 cm.  REPRODUCTIVE ORGANS: Mild prostatomegaly.     BOWEL: No bowel obstruction. Appendix normal.  PERITONEUM: No ascites.  VESSELS:  Within normal limits.  RETROPERITONEUM: No lymphadenopathy.    ABDOMINAL WALL: Within normal limits.  BONES: Degenerative changes of the spine.    IMPRESSION: Bladder wall thickening and inflammation suggesting cystitis.   Urinalysis correlation is recommended.     Mild left hydronephrosis without visible obstructing stone.                  TREVOR ALICEA M.D., ATTENDING RADIOLOGIST  This document has been electronically signed. Aug 22 2018  7:20PM                < end of copied text >

## 2018-08-28 NOTE — DISCHARGE NOTE ADULT - MEDICATION SUMMARY - MEDICATIONS TO TAKE
I will START or STAY ON the medications listed below when I get home from the hospital:    aspirin 81 mg oral tablet  -- 1 tab(s) by mouth once a day  -- Indication: For stroke prevention    tamsulosin 0.4 mg oral capsule  -- 1 cap(s) by mouth once a day (at bedtime)  -- Indication: For BPH (benign prostatic hyperplasia)    atenolol 25 mg oral tablet  -- 1 tab(s) by mouth once a day  -- Indication: For HTN    dorzolamide-timolol 2%-0.5% preservative-free ophthalmic solution  -- 2 drop(s) to each affected eye   -- Indication: For Glaucoma    latanoprost 0.005% ophthalmic solution  -- 1 drop(s) to each affected eye once a day  -- Indication: For Glaucoma    Augmentin 875 mg-125 mg oral tablet  -- 1 tab(s) by mouth every 12 hours   -- Finish all this medication unless otherwise directed by prescriber.  Take with food or milk.    -- Indication: For sepsis/cystitis    Bacid (LAC) oral tablet  -- 2 tab(s) by mouth once a day   -- Indication: For probiotic

## 2018-08-28 NOTE — DISCHARGE NOTE ADULT - PLAN OF CARE
cure infection complete antibiotic course.  see PCP or infection doctor in 5-7 days flomax.  see urologist in 3-4 weeks. very mild elevation.  repeat CMP in 5-7 days and f/up with PCP.  avoid herbals meds and supplements for one week

## 2018-08-28 NOTE — PROGRESS NOTE ADULT - ATTENDING COMMENTS
possible discharge tomorrow if cleared by ID
likely discharge home today if cleared by ID.  Family will arrive around 5pm; will have detailed discharge discussion at that time

## 2018-08-28 NOTE — DISCHARGE NOTE ADULT - MEDICATION SUMMARY - MEDICATIONS TO STOP TAKING
I will STOP taking the medications listed below when I get home from the hospital:    cefuroxime 500 mg oral tablet  -- 1 tab(s) by mouth every 12 hours

## 2018-08-28 NOTE — DISCHARGE NOTE ADULT - ADDITIONAL INSTRUCTIONS
complete antibiotic course.  see infection doctor in 5-7 days.  see urologist in 3-4 weeks.  repeat CMP in 5-7 days and f/up with PCP in 5-7 days.  avoid herbals meds and supplements for one week

## 2018-08-28 NOTE — DISCHARGE NOTE ADULT - CARE PLAN
Principal Discharge DX:	Bacteremia  Goal:	cure infection  Assessment and plan of treatment:	complete antibiotic course.  see PCP or infection doctor in 5-7 days  Secondary Diagnosis:	Benign prostatic hyperplasia with lower urinary tract symptoms, symptom details unspecified  Assessment and plan of treatment:	flomax.  see urologist in 3-4 weeks.  Secondary Diagnosis:	Abnormal LFTs (liver function tests)  Assessment and plan of treatment:	very mild elevation.  repeat CMP in 5-7 days and f/up with PCP.  avoid herbals meds and supplements for one week

## 2018-08-28 NOTE — DISCHARGE NOTE ADULT - HOSPITAL COURSE
This is a 79 y/o M with PMH of HTN, and Glaucoma s/p left eye blindness, who was discharged from [facility] yesterday after being admitted for 2 days with UTI, was called to come back to the hospital as lab released his blood culture results that was found to be positive for gram negative rods in aerobic bottle. Patient states he feels fine, denies any symptoms. This is a 77 y/o M with PMH of HTN, and Glaucoma s/p left eye blindness, who was discharged from [facility] yesterday after being admitted for 2 days with UTI, was called to come back to the hospital as lab released his blood culture results that was found to be positive for gram negative rods in aerobic bottle. Patient states he feels fine, denies any symptoms.  Repeat cultures NGTD.  Seen and cleared by ID for outpatient f/up.  Seen and cleared by urology for outpatient f/up.  Has new PCP - should f/up in 5-7 days.  Patient/brother informed and educated about discharge plan at bedside.

## 2018-08-28 NOTE — CONSULT NOTE ADULT - PROBLEM SELECTOR RECOMMENDATION 9
Bladder wall thickening is likely the result of bladder outlet obstruction from BPH and not cystitis in light of negative urine cultures. Bladder scan ordered to check post void residual and tamsulosin ordered to reduce bladder outlet resistance. Patient should follow up in 3-4 weeks as an outpatient.

## 2018-08-28 NOTE — DISCHARGE NOTE ADULT - CARE PROVIDER_API CALL
Harsha Rachel), Urology  [address] [place] [address]  Phone: [phone]  Fax: [phone] Harsha Rachel), Urology  [address] [facility]  [address]  [place] [address]  Phone: [phone]  Fax: [phone]    Doris Dubois), Infectious Disease; Internal Medicine  [address] [place] [address]  Phone: [phone]  Fax: [phone]

## 2018-08-28 NOTE — PROGRESS NOTE ADULT - PROBLEM SELECTOR PLAN 1
Asymptomatic, no fever/chills, normal TLC & differential, normal lactate level, nothing to suggest sepsis, repeat cultures were obtained by ED team prior to admission, started patient on Zosyn 3.375 gm IVPB Q8 hours, will monitor and follow culture results, ID consult with Dr. Dubois was called.  Still has WBC in urine. Recent CT showed cystitis.  repeat cultures NGTD.  await final ID recs

## 2018-08-28 NOTE — PROGRESS NOTE ADULT - SUBJECTIVE AND OBJECTIVE BOX
TERRY MITCHELL is a 78yMale , patient examined and chart reviewed.     INTERVAL HPI/ OVERNIGHT EVENTS:   Feels well. No events.  Afebrile.    PAST MEDICAL & SURGICAL HISTORY:  Blind left eye  Hypertension  History of right cataract extraction    For details regarding the patient's social history, family history, and other miscellaneous elements, please refer the initial infectious diseases consultation and/or the admitting history and physical examination for this admission.    ROS:  CONSTITUTIONAL:  Negative fever or chills, feels well, good appetite  EYES:  Negative  blurry vision or double vision  CARDIOVASCULAR:  Negative for chest pain or palpitations  RESPIRATORY:  Negative for cough, wheezing, or SOB   GASTROINTESTINAL:  Negative for nausea, vomiting, diarrhea, constipation, or abdominal pain  GENITOURINARY:  Negative frequency, urgency or dysuria  NEUROLOGIC:  No headache, confusion, dizziness, lightheadedness  All other systems were reviewed and are negative       Current inpatient medications :    ANTIBIOTICS/RELEVANT:  piperacillin/tazobactam IVPB. 3.375 Gram(s) IV Intermittent every 8 hours      aspirin enteric coated 81 milliGRAM(s) Oral daily  ATENolol  Tablet 25 milliGRAM(s) Oral daily  dorzolamide 2%/timolol 0.5% Ophthalmic Solution 2 Drop(s) Both EYES two times a day  enoxaparin Injectable 40 milliGRAM(s) SubCutaneous every 24 hours  latanoprost 0.005% Ophthalmic Solution 1 Drop(s) Both EYES at bedtime  tamsulosin 0.4 milliGRAM(s) Oral at bedtime      Objective:    08-27 @ 07:01 - 08-28 @ 07:00  --------------------------------------------------------  IN: 0 mL / OUT: 1100 mL / NET: -1100 mL    08-28 @ 07:01 - 08-28 @ 15:20  --------------------------------------------------------  IN: 0 mL / OUT: 150 mL / NET: -150 mL      T(C): 37 (08-28-18 @ 14:15), Max: 37.2 (08-27-18 @ 17:10)  HR: 54 (08-28-18 @ 14:15) (52 - 70)  BP: 132/74 (08-28-18 @ 14:15) (127/71 - 156/67)  RR: 18 (08-28-18 @ 14:15) (16 - 18)  SpO2: 96% (08-28-18 @ 14:15) (94% - 96%)  Wt(kg): --      Physical Exam:  General:  no acute distress  Eyes: sclera anicteric, pupils equal and reactive to light  ENMT: buccal mucosa moist, pharynx not injected  Neck: supple, trachea midline  Lungs: clear, no wheeze/rhonchi  Cardiovascular: regular rate and rhythm, S1 S2  Abdomen: soft, nontender, no organomegaly present, bowel sounds normal  Neurological: alert and oriented x3, Cranial Nerves II-XII grossly intact  Skin: no increased ecchymosis/petechiae/purpura  Lymph Nodes: no palpable cervical/supraclavicular lymph nodes enlargements  Extremities: no cyanosis/clubbing/edema        LABS:                          13.2   5.88  )-----------( 225      ( 28 Aug 2018 06:43 )             39.3       08-28    138  |  104  |  16  ----------------------------<  110<H>  4.3   |  29  |  1.04    Ca    9.3      28 Aug 2018 06:43    TPro  7.3  /  Alb  3.3  /  TBili  0.4  /  DBili  x   /  AST  33  /  ALT  85<H>  /  AlkPhos  143<H>  08-28      MICROBIOLOGY:    Culture - Blood (collected 26 Aug 2018 21:19)  Source: .Blood Blood-Peripheral  Preliminary Report (27 Aug 2018 22:02):    No growth to date.    Culture - Blood (collected 26 Aug 2018 11:39)  Source: .Blood Blood-Peripheral  Preliminary Report (27 Aug 2018 12:01):    No growth to date.    Culture - Blood (collected 26 Aug 2018 11:39)  Source: .Blood Blood-Peripheral  Preliminary Report (27 Aug 2018 12:01):    No growth to date.    Culture - Urine (collected 26 Aug 2018 11:27)  Source: .Urine Clean Catch (Midstream)  Final Report (27 Aug 2018 08:39):    No growth        Culture - Blood (08.22.18 @ 21:30)    Gram Stain:   Growth in anaerobic bottle: Gram Positive Rods    Specimen Source: .Blood Blood    Culture Results:   Growth in anaerobic bottle: Gram Positive Rods    Culture - Blood (08.22.18 @ 21:30)    Gram Stain:   Growth in aerobic bottle: Gram Negative Rods  Growth in anaerobic bottle: Gram Negative Coccobacilli    -  Multidrug (KPC pos) resistant organism: Nondet    -  Multidrug (KPC pos) resistant organism: Nondet    -  Staphylococcus aureus: Nondet    -  Staphylococcus aureus: Nondet    -  Methicillin resistant Staphylococcus aureus (MRSA): Nondet    -  Methicillin resistant Staphylococcus aureus (MRSA): Nondet    -  Coagulase negative Staphylococcus: Nondet    -  Coagulase negative Staphylococcus: Nondet    -  Enterococcus species: Nondet    -  Enterococcus species: Nondet    -  Vancomycin resistant Enterococcus sp.: Nondet    -  Vancomycin resistant Enterococcus sp.: Nondet    -  Escherichia coli: Nondet    -  Escherichia coli: Nondet    -  Klebsiella oxytoca: Nondet    -  Klebsiella oxytoca: Nondet    -  Klebsiella pneumoniae: Nondet    -  Klebsiella pneumoniae: Nondet    -  Serratia marcescens: Nondet    -  Serratia marcescens: Nondet    -  Haemophilus influenzae: Nondet    -  Haemophilus influenzae: Nondet    -  Listeria monocytogenes: Nondet    -  Listeria monocytogenes: Nondet    -  Neisseria meningitidis: Nondet    -  Neisseria meningitidis: Nondet    -  Pseudomonas aeruginosa: Nondet    -  Pseudomonas aeruginosa: Nondet    -  Acinetobacter baumanii: Nondet    -  Acinetobacter baumanii: Nondet    -  Enterobacter cloacae complex: Nondet    -  Enterobacter cloacae complex: Nondet    -  Streptococcus sp. (Not Grp A, B or S pneumoniae): Nondet    -  Streptococcus sp. (Not Grp A, B or S pneumoniae): Nondet    -  Streptococcus agalactiae (Group B): Nondet    -  Streptococcus agalactiae (Group B): Nondet    -  Streptococcus pyogenes (Group A): Nondet    -  Streptococcus pyogenes (Group A): Nondet    -  Streptococcus pneumoniae: Nondet    -  Streptococcus pneumoniae: Nondet    -  Candida albicans: Nondet    -  Candida albicans: Nondet    -  Candida glabrata: Nondet    -  Candida glabrata: Nondet    -  Candida krusei: Nondet    -  Candida krusei: Nondet    -  Candida parapsilosis: Nondet    -  Candida parapsilosis: Nondet    -  Candida tropicalis: Nondet    -  Candida tropicalis: Nondet    Specimen Source: .Blood Blood    Organism: Blood Culture PCR    Organism: Blood Culture PCR    Culture Results:   Growth in aerobic bottle: Gram Negative Rods  Growth in anaerobic bottle: Gram Negative Coccobacilli  "Due to technical problems, Proteus sp. will Not be reported as part of  the BCID panel until further notice"  ***Blood Panel PCR results on this specimen are available  approximately 3 hours after the Gram stain result.***  Gram stain, PCR, and/or culture results may not always  correspond due to difference in methodologies.  ************************************************************  This PCR assay was performed using [employer]  The following targets are tested for: Enterococcus,  vancomycin resistant enterococci, Listeria monocytogenes,  coagulase negative staphylococci, S. aureus,  methicillin resistant S. aureus, Streptococcus agalactiae  (Group B), S. pneumoniae, S. pyogenes (Group A),  Acinetobacter baumannii, Enterobacter cloacae, E. coli,  Klebsiella oxytoca, K. pneumoniae, Proteus sp.,  Serratia marcescens, Haemophilus influenzae,  Neisseria meningitidis, Pseudomonas aeruginosa, Candida  albicans, C. glabrata, C krusei, C parapsilosis,  C. tropicalis and the KPC resistance gene.    Organism Identification: Blood Culture PCR  Blood Culture PCR    Method Type: PCR    Method Type: PCR    Assessment :  79 YO M admitted with GNR bacteremia poss sec UTI/cystitis though Ucx ngtd  No GI symptoms  CT stone stiles noted with mild left hydro and cystitis  Blood cultures with GNR GN Coccobacilli and GPR from 8/22 still with no ID  Spoke to [facility] and they are having a difficult time with identification of the orgs  States that they will have to sent it out and many take another week or more to ID orgs  Pt is clinically stable  Also seen by urology and GI and  no intervention recommended    Plan :   On Zosyn day 4  Can dc home from ID standpoint on Augmentin x 5 days  Will follow up final cultures outpt    D/w Dr Knox      Continue with present regiment.  Appropriate use of antibiotics and adverse effects reviewed.      I have discussed the above plan of care with patient/ family in detail. They expressed understanding of the  treatment plan . Risks, benefits and alternatives discussed in detail. I have asked if they have any questions or concerns and appropriately addressed them to the best of my ability .    > 35 minutes were spent in direct patient care reviewing notes, medications ,labs data/ imaging , discussion with multidisciplinary team.    Thank you for allowing me to participate in care of your patient .    Michael Dubois MD  Infectious Disease  [phone]

## 2018-08-28 NOTE — DISCHARGE NOTE ADULT - PATIENT PORTAL LINK FT
You can access the [facility] Portal, offered by [facility], by registering with the following website: [email or web address]

## 2018-08-28 NOTE — DISCHARGE NOTE ADULT - CARE PROVIDERS DIRECT ADDRESSES
[email or web address]

## 2018-08-28 NOTE — DISCHARGE NOTE ADULT - SECONDARY DIAGNOSIS.
Benign prostatic hyperplasia with lower urinary tract symptoms, symptom details unspecified Abnormal LFTs (liver function tests)

## 2018-08-29 LAB — ANA TITR SER: NEGATIVE — SIGNIFICANT CHANGE UP

## 2018-08-31 LAB
CULTURE RESULTS: SIGNIFICANT CHANGE UP
SPECIMEN SOURCE: SIGNIFICANT CHANGE UP

## 2018-10-10 LAB
CULTURE RESULTS: SIGNIFICANT CHANGE UP
ORGANISM # SPEC MICROSCOPIC CNT: SIGNIFICANT CHANGE UP
SPECIMEN SOURCE: SIGNIFICANT CHANGE UP

## 2020-01-08 NOTE — ED ADULT NURSE NOTE - NSFALLRSKHMRSKTYOTFT_ED_ALL_ED
Quality 431: Preventive Care And Screening: Unhealthy Alcohol Use - Screening: Patient screened for unhealthy alcohol use using a single question and scores less than 2 times per year
Detail Level: Detailed
Quality 130: Documentation Of Current Medications In The Medical Record: Current Medications Documented
Quality 226: Preventive Care And Screening: Tobacco Use: Screening And Cessation Intervention: Patient screened for tobacco use and is an ex/non-smoker
weakness

## 2020-01-18 NOTE — ED ADULT NURSE NOTE - PSH
Patient's father was not swabbed but is on Tamiflu for influenza    Currently in [place] ER here with another child for dehydration due to vomiting     Patient threw up this morning, emily cheeks which were warm to the touch     Would like to get a dose of Tamiflu for patient as many family members are already sick and he is starting with symptoms    Pharmacy preferred - [facility]       History of right cataract extraction

## 2020-03-02 ENCOUNTER — TRANSCRIPTION ENCOUNTER (OUTPATIENT)
Age: 80
End: 2020-03-02

## 2021-01-01 NOTE — DISCHARGE NOTE ADULT - THE PATIENT HAS
PEDIATRIC HOSPITALIST PROGRESS NOTE      INTERVAL HISTORY:    Gus Flannery is a 4 week old male patient admitted with NBNB emesis, dehydration found to have pyloric stenosis.  Overnight, did well post operatively from a pain control standpoint. However only PO-ing 30-45 in volumes with some emesis with larger quantities. UOP adequate 3.2cc/kg/hr    ROS otherwise negative.    VITAL SIGNS:     Vital Last Value 24 Hour Range   Temperature 97.5 °F (36.4 °C) (05/04/21 1130) Temp  Min: 96.8 °F (36 °C)  Max: 98.8 °F (37.1 °C)   Pulse 150 (05/04/21 1130) Pulse  Min: 124  Max: 172   Respiratory 44 (05/04/21 1130) Resp  Min: 32  Max: 44   Non-Invasive  Blood Pressure (!) 96/63 (05/04/21 1130) BP  Min: 78/64  Max: 107/51   Pulse Oximetry 100 % (05/04/21 1130) SpO2  Min: 92 %  Max: 100 %     Vital Today Admitted   Weight 4.3 kg (9 lb 7.7 oz) (05/02/21 2106) Weight: (!) 4.41 kg (9 lb 11.6 oz) (05/02/21 1918)     INTAKE/OUTPUT:    Date 05/04/21 0700 - 05/05/21 0659   Shift 7526-9197 0873-6948 5742-6668 24 Hour Total   INTAKE   P.O. 120   120   I.V. 77   77   Shift Total 197   197   OUTPUT   Urine 116   116   Other 43   43   Shift Total 159   159   Weight (kg) 4.3 4.3 4.3 4.3       PHYSICAL EXAM:    GEN: NAD, intermittent pooling of formula around mouth   HEENT: AFOSF, EOMI MMM   CV: RRR, nl S1 and S2, no m/r/g  RESP: CTAB and Good aeration  ABD: +BS soft mildly distended with tenderness to palpation surrounding surgical sites, c/d/i no drainage  EXT: full ROM  SKIN: no rashes noted    Medications:  Current Facility-Administered Medications   Medication Dose Route Frequency Provider Last Rate Last Admin   • acetaminophen (TYLENOL) 160 MG/5ML suspension 64 mg  15 mg/kg (Dosing Weight) Oral Q4H PRN Reginaldo Giles MD   64 mg at 05/03/21 2309   • morphine injection 0.22 mg  0.05 mg/kg (Dosing Weight) Intravenous Q2H PRN Reginaldo Giles MD       • dextrose 5 % / sodium chloride 0.45% with KCl 20 mEq infusion    Intravenous Continuous Tennille Cooper MD 17 mL/hr at 05/03/21 1656 New Bag at 05/03/21 1656   • ondansetron (ZOFRAN) injection 0.44 mg  0.1 mg/kg (Dosing Weight) Intravenous Once PRN Darby Flaherty MD           LABORATORY DATA/IMAGING  No new labs or imaging     ASSESSMENT/PLAN:     Gus Flannery is a 4 week old ex term baby who presented with NBNB emesis found to have pyloric stenosis he is now s/p laparoscopic pyloromyotomy on 5/3. Doing well with pain control post operatively. However still having some spit ups when we have tried to slowly titrate up his volumes. UOP looks good so will cut fluids as we continue to work on it with goal to maintain hydration of 100cc/kg throughout the course of the day. If still has trouble with taking volumes can restart later today. Has not stooled since Friday (4/30). Hopeful with feeds and coming out of post-op period gut will wake back up. Bowel sounds normoactive at this time and abdominal exam reassuring. No signs of lower obstruction.       D/w RN and family at bedside    Elisha Andrade MD FAAP  Pediatric Hospitalist  Reachable by Celia                            no difficulties

## 2021-03-08 ENCOUNTER — APPOINTMENT (OUTPATIENT)
Dept: UROLOGY | Facility: CLINIC | Age: 81
End: 2021-03-08
Payer: MEDICARE

## 2021-03-08 PROCEDURE — 51798 US URINE CAPACITY MEASURE: CPT

## 2021-03-08 PROCEDURE — 99072 ADDL SUPL MATRL&STAF TM PHE: CPT

## 2021-03-08 PROCEDURE — 99203 OFFICE O/P NEW LOW 30 MIN: CPT | Mod: 25

## 2021-03-08 NOTE — PHYSICAL EXAM
[General Appearance - Well Developed] : well developed [General Appearance - Well Nourished] : well nourished [Normal Appearance] : normal appearance [Well Groomed] : well groomed [General Appearance - In No Acute Distress] : no acute distress [Edema] : no peripheral edema [Respiration, Rhythm And Depth] : normal respiratory rhythm and effort [Exaggerated Use Of Accessory Muscles For Inspiration] : no accessory muscle use [Abdomen Soft] : soft [Abdomen Tenderness] : non-tender [Abdomen Mass (___ Cm)] : no abdominal mass palpated [Abdomen Hernia] : no hernia was discovered [Costovertebral Angle Tenderness] : no ~M costovertebral angle tenderness [Normal Station and Gait] : the gait and station were normal for the patient's age [] : no rash [No Focal Deficits] : no focal deficits [Oriented To Time, Place, And Person] : oriented to person, place, and time [Affect] : the affect was normal [Mood] : the mood was normal [Not Anxious] : not anxious [Cervical Lymph Nodes Enlarged Posterior Bilaterally] : posterior cervical [Cervical Lymph Nodes Enlarged Anterior Bilaterally] : anterior cervical [Supraclavicular Lymph Nodes Enlarged Bilaterally] : supraclavicular [FreeTextEntry1] : bladder scan : possible bladder tic - PVR- 175 ml

## 2021-03-08 NOTE — ASSESSMENT
[FreeTextEntry1] : patient here with daughter\par c/o bacteria in urine\par no records to review\par bladder scan today showed possible bladder tic\par will check urine and NICCI and bladder US\par try to get urine records to review\par may need cysto

## 2021-03-08 NOTE — REVIEW OF SYSTEMS
[Eyesight Problems] : eyesight problems [Dry Eyes] : dryness of the eyes [Urine Infection (bladder/kidney)] : bladder/kidney infection [Negative] : Heme/Lymph [see HPI] : see HPI [FreeTextEntry2] : HBP

## 2021-03-08 NOTE — HISTORY OF PRESENT ILLNESS
[FreeTextEntry1] : patient here with daughter for bacteria in urine\par states no sxs of uti \par had hosp admission for sepsis 5 years ago \par hx of TURP for diff to void\par no voiding diff  now\par some frequney cand occas dysuiria but good flow and nocturia 0-2 \par occas intermittency to void but good flow and feels emptyu after void \par denies hematuria or N/V

## 2021-03-10 LAB
APPEARANCE: ABNORMAL
BACTERIA UR CULT: NORMAL
BACTERIA: ABNORMAL
BILIRUBIN URINE: NEGATIVE
BLOOD URINE: NORMAL
COLOR: YELLOW
GLUCOSE QUALITATIVE U: NEGATIVE
HYALINE CASTS: 0 /LPF
KETONES URINE: NEGATIVE
LEUKOCYTE ESTERASE URINE: ABNORMAL
MICROSCOPIC-UA: NORMAL
NITRITE URINE: POSITIVE
PH URINE: 8
PROTEIN URINE: ABNORMAL
RED BLOOD CELLS URINE: 4 /HPF
SPECIFIC GRAVITY URINE: 1.01
SQUAMOUS EPITHELIAL CELLS: 1 /HPF
UROBILINOGEN URINE: NORMAL
WHITE BLOOD CELLS URINE: 105 /HPF

## 2021-03-24 ENCOUNTER — APPOINTMENT (OUTPATIENT)
Dept: UROLOGY | Facility: CLINIC | Age: 81
End: 2021-03-24

## 2021-03-25 NOTE — DISCHARGE NOTE ADULT - LAUNCH MEDICATION RECONCILIATION
<<-----Click here for Discharge Medication Review Simple: Patient demonstrates quick and easy understanding/Verbalized Understanding

## 2021-04-21 ENCOUNTER — APPOINTMENT (OUTPATIENT)
Dept: UROLOGY | Facility: CLINIC | Age: 81
End: 2021-04-21
Payer: MEDICARE

## 2021-04-21 DIAGNOSIS — Z87.440 PERSONAL HISTORY OF URINARY (TRACT) INFECTIONS: ICD-10-CM

## 2021-04-21 DIAGNOSIS — R35.0 FREQUENCY OF MICTURITION: ICD-10-CM

## 2021-04-21 DIAGNOSIS — Z87.898 PERSONAL HISTORY OF OTHER SPECIFIED CONDITIONS: ICD-10-CM

## 2021-04-21 PROCEDURE — 99072 ADDL SUPL MATRL&STAF TM PHE: CPT

## 2021-04-21 PROCEDURE — 52000 CYSTOURETHROSCOPY: CPT

## 2021-05-06 ENCOUNTER — INPATIENT (INPATIENT)
Facility: HOSPITAL | Age: 81
LOS: 7 days | Discharge: HOME | DRG: 871 | End: 2021-05-14
Attending: INTERNAL MEDICINE | Admitting: INTERNAL MEDICINE
Payer: MEDICARE

## 2021-05-06 VITALS
DIASTOLIC BLOOD PRESSURE: 65 MMHG | RESPIRATION RATE: 24 BRPM | WEIGHT: 167.55 LBS | SYSTOLIC BLOOD PRESSURE: 152 MMHG | OXYGEN SATURATION: 95 % | TEMPERATURE: 101 F | HEIGHT: 69.29 IN | HEART RATE: 97 BPM

## 2021-05-06 LAB
ALBUMIN SERPL ELPH-MCNC: 3.7 G/DL — SIGNIFICANT CHANGE UP (ref 3.3–5)
ALP SERPL-CCNC: 85 U/L — SIGNIFICANT CHANGE UP (ref 30–120)
ALT FLD-CCNC: 24 U/L DA — SIGNIFICANT CHANGE UP (ref 10–60)
ANION GAP SERPL CALC-SCNC: 13 MMOL/L — SIGNIFICANT CHANGE UP (ref 5–17)
APPEARANCE UR: ABNORMAL
APTT BLD: 27.6 SEC — SIGNIFICANT CHANGE UP (ref 27.5–35.5)
AST SERPL-CCNC: 22 U/L — SIGNIFICANT CHANGE UP (ref 10–40)
BASOPHILS # BLD AUTO: 0 K/UL — SIGNIFICANT CHANGE UP (ref 0–0.2)
BASOPHILS NFR BLD AUTO: 0 % — SIGNIFICANT CHANGE UP (ref 0–2)
BILIRUB SERPL-MCNC: 0.6 MG/DL — SIGNIFICANT CHANGE UP (ref 0.2–1.2)
BILIRUB UR-MCNC: NEGATIVE — SIGNIFICANT CHANGE UP
BUN SERPL-MCNC: 24 MG/DL — HIGH (ref 7–23)
CALCIUM SERPL-MCNC: 8.8 MG/DL — SIGNIFICANT CHANGE UP (ref 8.4–10.5)
CHLORIDE SERPL-SCNC: 104 MMOL/L — SIGNIFICANT CHANGE UP (ref 96–108)
CO2 SERPL-SCNC: 23 MMOL/L — SIGNIFICANT CHANGE UP (ref 22–31)
COLOR SPEC: ABNORMAL
CREAT SERPL-MCNC: 1.34 MG/DL — HIGH (ref 0.5–1.3)
DIFF PNL FLD: ABNORMAL
EOSINOPHIL # BLD AUTO: 0.04 K/UL — SIGNIFICANT CHANGE UP (ref 0–0.5)
EOSINOPHIL NFR BLD AUTO: 5 % — SIGNIFICANT CHANGE UP (ref 0–6)
GLUCOSE SERPL-MCNC: 127 MG/DL — HIGH (ref 70–99)
GLUCOSE UR QL: NEGATIVE MG/DL — SIGNIFICANT CHANGE UP
HCT VFR BLD CALC: 38.2 % — LOW (ref 39–50)
HGB BLD-MCNC: 12.7 G/DL — LOW (ref 13–17)
INR BLD: 1.11 RATIO — SIGNIFICANT CHANGE UP (ref 0.88–1.16)
KETONES UR-MCNC: NEGATIVE — SIGNIFICANT CHANGE UP
LACTATE SERPL-SCNC: 2.3 MMOL/L — HIGH (ref 0.7–2)
LEUKOCYTE ESTERASE UR-ACNC: ABNORMAL
LYMPHOCYTES # BLD AUTO: 0.38 K/UL — LOW (ref 1–3.3)
LYMPHOCYTES # BLD AUTO: 49 % — HIGH (ref 13–44)
MCHC RBC-ENTMCNC: 30.1 PG — SIGNIFICANT CHANGE UP (ref 27–34)
MCHC RBC-ENTMCNC: 33.2 GM/DL — SIGNIFICANT CHANGE UP (ref 32–36)
MCV RBC AUTO: 90.5 FL — SIGNIFICANT CHANGE UP (ref 80–100)
MONOCYTES # BLD AUTO: 0.02 K/UL — SIGNIFICANT CHANGE UP (ref 0–0.9)
MONOCYTES NFR BLD AUTO: 2 % — SIGNIFICANT CHANGE UP (ref 2–14)
NEUTROPHILS # BLD AUTO: 0.31 K/UL — LOW (ref 1.8–7.4)
NEUTROPHILS NFR BLD AUTO: 40 % — LOW (ref 43–77)
NITRITE UR-MCNC: POSITIVE
NRBC # BLD: SIGNIFICANT CHANGE UP /100 WBCS (ref 0–0)
PH UR: 8 — SIGNIFICANT CHANGE UP (ref 5–8)
PLATELET # BLD AUTO: 204 K/UL — SIGNIFICANT CHANGE UP (ref 150–400)
POTASSIUM SERPL-MCNC: 3.9 MMOL/L — SIGNIFICANT CHANGE UP (ref 3.5–5.3)
POTASSIUM SERPL-SCNC: 3.9 MMOL/L — SIGNIFICANT CHANGE UP (ref 3.5–5.3)
PROT SERPL-MCNC: 7.3 G/DL — SIGNIFICANT CHANGE UP (ref 6–8.3)
PROT UR-MCNC: 100 MG/DL
PROTHROM AB SERPL-ACNC: 13.4 SEC — SIGNIFICANT CHANGE UP (ref 10.6–13.6)
RBC # BLD: 4.22 M/UL — SIGNIFICANT CHANGE UP (ref 4.2–5.8)
RBC # FLD: 12.7 % — SIGNIFICANT CHANGE UP (ref 10.3–14.5)
SODIUM SERPL-SCNC: 140 MMOL/L — SIGNIFICANT CHANGE UP (ref 135–145)
SP GR SPEC: 1.01 — SIGNIFICANT CHANGE UP (ref 1.01–1.02)
UROBILINOGEN FLD QL: NEGATIVE MG/DL — SIGNIFICANT CHANGE UP
WBC # BLD: 0.78 K/UL — CRITICAL LOW (ref 3.8–10.5)
WBC # FLD AUTO: 0.78 K/UL — CRITICAL LOW (ref 3.8–10.5)

## 2021-05-06 PROCEDURE — 93010 ELECTROCARDIOGRAM REPORT: CPT

## 2021-05-06 PROCEDURE — 74176 CT ABD & PELVIS W/O CONTRAST: CPT | Mod: 26,ME

## 2021-05-06 PROCEDURE — G1004: CPT

## 2021-05-06 PROCEDURE — 99285 EMERGENCY DEPT VISIT HI MDM: CPT

## 2021-05-06 RX ORDER — SODIUM CHLORIDE 9 MG/ML
2400 INJECTION INTRAMUSCULAR; INTRAVENOUS; SUBCUTANEOUS ONCE
Refills: 0 | Status: COMPLETED | OUTPATIENT
Start: 2021-05-06 | End: 2021-05-06

## 2021-05-06 RX ORDER — ACETAMINOPHEN 500 MG
1000 TABLET ORAL ONCE
Refills: 0 | Status: COMPLETED | OUTPATIENT
Start: 2021-05-06 | End: 2021-05-06

## 2021-05-06 RX ORDER — CEFTRIAXONE 500 MG/1
1000 INJECTION, POWDER, FOR SOLUTION INTRAMUSCULAR; INTRAVENOUS ONCE
Refills: 0 | Status: COMPLETED | OUTPATIENT
Start: 2021-05-06 | End: 2021-05-06

## 2021-05-06 RX ADMIN — CEFTRIAXONE 100 MILLIGRAM(S): 500 INJECTION, POWDER, FOR SOLUTION INTRAMUSCULAR; INTRAVENOUS at 23:02

## 2021-05-06 RX ADMIN — Medication 1000 MILLIGRAM(S): at 23:01

## 2021-05-06 NOTE — ED ADULT NURSE NOTE - OBJECTIVE STATEMENT
Pt is a 81 year old Male, arrived by daughter after blood in urine. Pt also presented shaking, febrile to 101.2, alert and oriented to person and time but not place. new onset as per daughter. Pt had similar presenting symptoms a few years ago and was treated for sepsis. Denies chest pain, nausea

## 2021-05-06 NOTE — ED ADULT NURSE NOTE - FINAL NURSING ELECTRONIC SIGNATURE
[facility]  [address]  [address] [place], [phone]  [phone]    [email or web address]      4/29/2019      To Whom if May Concern:      My patient, Niharika Morrow , has sufficiently recovered from her recent pregnancy and may return to work 04/29/2019.       If you have any questions, please call my office at [phone].        Sincerely,              Joselito Puckett MD               07-May-2021 02:42

## 2021-05-06 NOTE — ED ADULT TRIAGE NOTE - LANGUAGE ASSISTANCE NEEDED
No-Patient/Caregiver offered and refused free interpretation services. Dolor de pecho inespecífico      El dolor de pecho puede deberse a muchas afecciones diferentes. Siempre existe mishel posibilidad de que el dolor esté relacionado con algo grave, senthil un infarto de miocardio o un coágulo sanguíneo en los pulmones. Hay diferentes afecciones que no son potencialmente mortales que pueden causar dolor de pecho. Si tiene dolor de pecho, es muy importante que se controle con el médico.     ¿Cuáles son las causas?  Entre las causas de esta afección se incluyen las siguientes:    Acidez estomacal.  Neumonía o bronquitis.  Ansiedad o estrés.  Inflamación de la ruby que rodea el corazón (pericarditis) o los pulmones (pleuritis o pleuresía).  Un coágulo sanguíneo en el pulmón.  Pulmón colapsado (neumotórax). Puede aparecer de manera repentina por sí solo (neumotórax espontáneo) o debido a un traumatismo en el tórax.  Culebrilla (virus de la varicela zóster).  Infarto de miocardio.  Daño de los huesos, los músculos y los cartílagos que conforman la pared torácica. Imperial puede incluir lo siguiente:  Hematomas óseos debido a lesiones.  Distensiones musculares o de los cartílagos por tos frecuente o repetida, o por exceso de trabajo.  Fractura de mishel o más costillas.  Dolor de cartílago debido a inflamación (costocondritis).    ¿Qué incrementa el riesgo?  Entre los factores de riesgo de esta afección se pueden incluir los siguientes:    Actividades que incrementan el riesgo de sufrir traumatismos o lesiones en el tórax.  Infecciones o enfermedades respiratorias que causan tos frecuente.  Afecciones o excesos en las comidas que pueden causar acidez estomacal.  Cardiopatías coronarias o antecedentes familiares de enfermedades cardíacas.  Afecciones o comportamientos de tonia que aumentan el riesgo de tener un coágulo sanguíneo.  Pedro tenido varicela (varicela zóster).    ¿Cuáles son los signos o los síntomas?  El dolor de pecho puede provocar las siguientes sensaciones:    Ardor u hormigueo en la superficie o en lo profundo del pecho.  Dolor opresivo, continuo o constrictivo.  Dolor vago o intenso que empeora al moverse, toser o inhalar profundamente.  Dolor que también se siente en la espalda, el leodan, el hombro o el brazo, o dolor que se extiende a cualquiera de estas zonas.    El dolor de pecho puede aparecer y desaparecer, o keo puede ser anthony.    ¿Cómo se diagnostica?  Quizás se necesiten análisis de laboratorio u otros estudios para encontrar la causa del dolor. El médico puede indicarle que se raulito mishel prueba llamada ECG (electrocardiograma). El electrocardiograma registra los patrones de los latidos cardíacos en el momento en que se realiza el estudio. También pueden hacerle otros estudios, por ejemplo:    Ecocardiograma transtorácico (ETT). En samara estudio, se usan ondas sonoras para crear mishel imagen de las estructuras cardíacas y evaluar cómo circula la gigi por el corazón.  Ecocardiografía transesofágica (ETE). Samara es un estudio de diagnóstico por imágenes más avanzado mediante el cual se derrek imágenes del interior del cuerpo. Le permite al médico merrick el corazón con mayor detalle.  Monitoreo cardíaco. Permite que el médico controle la frecuencia y el ritmo cardíacos en tiempo real.  Monitor Holter. Es un dispositivo portátil que registra los latidos del corazón y puede ayudar a diagnosticar los latidos cardíacos anómalos. Le permite al médico registrar la actividad cardíaca adrianna varios días, si es necesario.  Pruebas de esfuerzo. Estas pueden realizarse adrianna el ejercicio o mediante la administración de un medicamento que acelera los latidos del corazón.  Análisis de gigi.  Otros estudios de diagnóstico por imágenes.    ¿Cómo se trata?  El tratamiento depende de la causa del dolor de pecho. El tratamiento puede incluir lo siguiente:    Medicamentos. Estos pueden incluir, entre otros, los siguientes:  Inhibidores de la acidez estomacal.  Antiinflamatorios.  Analgésicos para las enfermedades inflamatorias.  Antibióticos, si hay mishel infección.  Medicamentos para disolver los coágulos sanguíneos.  Medicamentos para tratar la enfermedad arterial coronaria (EAC).  Tratamiento complementario para las enfermedades que no requieren la sheng de medicamentos. Algunas opciones de samara tipo de tratamiento incluyen las siguientes:  Hacer reposo.  Aplicar compresas frías o calientes en las zonas lesionadas.  Limitar las actividades hasta que disminuya el dolor.    Siga estas indicaciones en romero casa:  Medicamentos    Si le recetaron un antibiótico, tómelo senthil se lo haya indicado el médico. No deje de maya el antibiótico aunque comience a sentirse mejor.  [place] los medicamentos de venta shiela y los recetados solamente senthil se lo haya indicado el médico.    Estilo de nohelia    No consuma ningún producto que contenga nicotina o tabaco, senthil cigarrillos y cigarrillos electrónicos. Si necesita ayuda para dejar de fumar, consulte al médico.  No oumou alcohol.  Raulito cambios en romero estilo de nohelia senthil se lo haya indicado el médico. Estos pueden incluir, entre otros, los siguientes:   Practicar actividad física con regularidad. Pida al médico que le sugiera algunas actividades que regina seguras para usted.  Consumir mishel dieta cardiosaludable. Un nutricionista matriculado puede ayudarlo a hacer elecciones saludables.  Mantener un peso saludable.  Controlar la diabetes, si es necesario.  Disminuir el nivel de estrés; por ejemplo, con yoga o técnicas de relajación.    Instrucciones generales    Evite las actividades que le causen dolor de pecho.  Si la causa del dolor de pecho es la acidez estomacal, levante (eleve) la cabecera de la cama aproximadamente 6 pulgadas (15 cm) colocando bloques debajo de las patas. Usar más almohadas para dormir no es efectivo para aliviar la acidez estomacal porque solo modificaría la posición de la vinicio.  Concurra a todas las visitas de seguimiento senthil se lo haya indicado el médico. Imperial es importante. Imperial incluye otros estudios si el dolor de pecho no desaparece.    Comuníquese con un médico si:  El dolor de pecho no desaparece.  Tiene mishel erupción cutánea con ampollas en el pecho.  Tiene fiebre.  Tiene escalofríos.    Solicite ayuda de inmediato si:  El dolor en el pecho es más intenso.  Tiene tos que empeora o tose con gigi.  Siente un dolor intenso en el abdomen.  Siente debilidad intensa.  Se desmaya.  Tiene mishel molestia repentina e inexplicable en el pecho.  Tiene molestias repentinas e inexplicables en los brazos, la espalda, el leodan o la mandíbula.  Le falta el aire en cualquier momento.  Comienza a sudar de manera repentina o la piel se le humedece.  Siente náuseas o vomita.  Se siente repentinamente mareado o se desmaya.  Siente que el corazón comienza a latir rápidamente o que se saltea latidos.    Estos síntomas pueden representar un problema grave que constituye mishel emergencia. No espere hasta que los síntomas desaparezcan. Solicite atención médica de inmediato. Comuníquese con el servicio de emergencias de romero localidad (911 en los [place]). No conduzca por evgeny propios medios hasta el hospital.    NOTAS ADICIONALES E INSTRUCCIONES    Por favor tenga seguimiento con romero doctor primario entre 2 gonzalez.  Regrese a urgencias por cualquier preocupacion medica.

## 2021-05-06 NOTE — ED PROVIDER NOTE - CLINICAL SUMMARY MEDICAL DECISION MAKING FREE TEXT BOX
1d fever and hematuria - iv, sepsis work up, maybe urosepsis given recent cystoscopy and that only symptom is hematuria, ct abd r/o stone, possible admission

## 2021-05-06 NOTE — ED PROVIDER NOTE - OBJECTIVE STATEMENT
81 y.o. M BIB family for fever and hematuria, had cystoscopy a couple of weeks ago, was on abx for 3d after, had occ sm amt of hematuria after, tonight about 2 hr PTA had yudelka hematuria and chills, has also had n/v, denies abd pain/flank pain, no cp, no sob, daughter at bedside states she is noticing some confusion (pt thinks he's at home) since coming to ED

## 2021-05-06 NOTE — ED ADULT NURSE REASSESSMENT NOTE - NS ED NURSE REASSESS COMMENT FT1
2 IV lines placed on patient, pt placed on CM, 2 liters of oxygen for comfort. labs drawn, blood cultures drawn. pt became hypotensive 82/50, MD notified. 2400 liters of fluid bolus given, ECG performed.   Reassessment after 2 liters, blood pressure improved to 118/ 60.

## 2021-05-07 DIAGNOSIS — Z98.890 OTHER SPECIFIED POSTPROCEDURAL STATES: Chronic | ICD-10-CM

## 2021-05-07 DIAGNOSIS — A41.9 SEPSIS, UNSPECIFIED ORGANISM: ICD-10-CM

## 2021-05-07 LAB
ALBUMIN SERPL ELPH-MCNC: 2.6 G/DL — LOW (ref 3.3–5)
ALP SERPL-CCNC: 83 U/L — SIGNIFICANT CHANGE UP (ref 30–120)
ALT FLD-CCNC: 142 U/L DA — HIGH (ref 10–60)
ANION GAP SERPL CALC-SCNC: 12 MMOL/L — SIGNIFICANT CHANGE UP (ref 5–17)
ANION GAP SERPL CALC-SCNC: 14 MMOL/L — SIGNIFICANT CHANGE UP (ref 5–17)
APTT BLD: 37.5 SEC — HIGH (ref 27.5–35.5)
AST SERPL-CCNC: 226 U/L — HIGH (ref 10–40)
BASOPHILS # BLD AUTO: 0.04 K/UL — SIGNIFICANT CHANGE UP (ref 0–0.2)
BASOPHILS NFR BLD AUTO: 1 % — SIGNIFICANT CHANGE UP (ref 0–2)
BILIRUB SERPL-MCNC: 1.5 MG/DL — HIGH (ref 0.2–1.2)
BUN SERPL-MCNC: 26 MG/DL — HIGH (ref 7–23)
BUN SERPL-MCNC: 31 MG/DL — HIGH (ref 7–23)
CALCIUM SERPL-MCNC: 7.3 MG/DL — LOW (ref 8.4–10.5)
CALCIUM SERPL-MCNC: 7.7 MG/DL — LOW (ref 8.4–10.5)
CHLORIDE SERPL-SCNC: 107 MMOL/L — SIGNIFICANT CHANGE UP (ref 96–108)
CHLORIDE SERPL-SCNC: 109 MMOL/L — HIGH (ref 96–108)
CO2 SERPL-SCNC: 15 MMOL/L — LOW (ref 22–31)
CO2 SERPL-SCNC: 19 MMOL/L — LOW (ref 22–31)
CREAT SERPL-MCNC: 1.74 MG/DL — HIGH (ref 0.5–1.3)
CREAT SERPL-MCNC: 2.31 MG/DL — HIGH (ref 0.5–1.3)
EOSINOPHIL # BLD AUTO: 0.04 K/UL — SIGNIFICANT CHANGE UP (ref 0–0.5)
EOSINOPHIL NFR BLD AUTO: 1 % — SIGNIFICANT CHANGE UP (ref 0–6)
GLUCOSE SERPL-MCNC: 89 MG/DL — SIGNIFICANT CHANGE UP (ref 70–99)
GLUCOSE SERPL-MCNC: 98 MG/DL — SIGNIFICANT CHANGE UP (ref 70–99)
HCT VFR BLD CALC: 32.3 % — LOW (ref 39–50)
HGB BLD-MCNC: 10.6 G/DL — LOW (ref 13–17)
INR BLD: 1.36 RATIO — HIGH (ref 0.88–1.16)
LACTATE SERPL-SCNC: 2.2 MMOL/L — HIGH (ref 0.7–2)
LACTATE SERPL-SCNC: 2.6 MMOL/L — HIGH (ref 0.7–2)
LACTATE SERPL-SCNC: 4.5 MMOL/L — CRITICAL HIGH (ref 0.7–2)
LYMPHOCYTES # BLD AUTO: 0.15 K/UL — LOW (ref 1–3.3)
LYMPHOCYTES # BLD AUTO: 4 % — LOW (ref 13–44)
MAGNESIUM SERPL-MCNC: 1.3 MG/DL — LOW (ref 1.6–2.6)
MAGNESIUM SERPL-MCNC: 1.7 MG/DL — SIGNIFICANT CHANGE UP (ref 1.6–2.6)
MCHC RBC-ENTMCNC: 29.8 PG — SIGNIFICANT CHANGE UP (ref 27–34)
MCHC RBC-ENTMCNC: 32.8 GM/DL — SIGNIFICANT CHANGE UP (ref 32–36)
MCV RBC AUTO: 90.7 FL — SIGNIFICANT CHANGE UP (ref 80–100)
MONOCYTES # BLD AUTO: 0.39 K/UL — SIGNIFICANT CHANGE UP (ref 0–0.9)
MONOCYTES NFR BLD AUTO: 10 % — SIGNIFICANT CHANGE UP (ref 2–14)
NEUTROPHILS # BLD AUTO: 3.23 K/UL — SIGNIFICANT CHANGE UP (ref 1.8–7.4)
NEUTROPHILS NFR BLD AUTO: 74 % — SIGNIFICANT CHANGE UP (ref 43–77)
NRBC # BLD: SIGNIFICANT CHANGE UP /100 WBCS (ref 0–0)
PHOSPHATE SERPL-MCNC: 1.4 MG/DL — LOW (ref 2.5–4.5)
PHOSPHATE SERPL-MCNC: 3.1 MG/DL — SIGNIFICANT CHANGE UP (ref 2.5–4.5)
PLATELET # BLD AUTO: 111 K/UL — LOW (ref 150–400)
POTASSIUM SERPL-MCNC: 3 MMOL/L — LOW (ref 3.5–5.3)
POTASSIUM SERPL-MCNC: 3.8 MMOL/L — SIGNIFICANT CHANGE UP (ref 3.5–5.3)
POTASSIUM SERPL-SCNC: 3 MMOL/L — LOW (ref 3.5–5.3)
POTASSIUM SERPL-SCNC: 3.8 MMOL/L — SIGNIFICANT CHANGE UP (ref 3.5–5.3)
PROT SERPL-MCNC: 5.3 G/DL — LOW (ref 6–8.3)
PROTHROM AB SERPL-ACNC: 16.2 SEC — HIGH (ref 10.6–13.6)
RBC # BLD: 3.56 M/UL — LOW (ref 4.2–5.8)
RBC # FLD: 12.7 % — SIGNIFICANT CHANGE UP (ref 10.3–14.5)
SARS-COV-2 RNA SPEC QL NAA+PROBE: SIGNIFICANT CHANGE UP
SODIUM SERPL-SCNC: 136 MMOL/L — SIGNIFICANT CHANGE UP (ref 135–145)
SODIUM SERPL-SCNC: 140 MMOL/L — SIGNIFICANT CHANGE UP (ref 135–145)
WBC # BLD: 3.85 K/UL — SIGNIFICANT CHANGE UP (ref 3.8–10.5)
WBC # FLD AUTO: 3.85 K/UL — SIGNIFICANT CHANGE UP (ref 3.8–10.5)

## 2021-05-07 PROCEDURE — 71045 X-RAY EXAM CHEST 1 VIEW: CPT | Mod: 26

## 2021-05-07 PROCEDURE — 76700 US EXAM ABDOM COMPLETE: CPT | Mod: 26

## 2021-05-07 PROCEDURE — G1004: CPT

## 2021-05-07 PROCEDURE — 70450 CT HEAD/BRAIN W/O DYE: CPT | Mod: 26,ME

## 2021-05-07 PROCEDURE — 99223 1ST HOSP IP/OBS HIGH 75: CPT

## 2021-05-07 RX ORDER — TAMSULOSIN HYDROCHLORIDE 0.4 MG/1
0.4 CAPSULE ORAL AT BEDTIME
Refills: 0 | Status: DISCONTINUED | OUTPATIENT
Start: 2021-05-07 | End: 2021-05-08

## 2021-05-07 RX ORDER — SODIUM CHLORIDE 9 MG/ML
1000 INJECTION INTRAMUSCULAR; INTRAVENOUS; SUBCUTANEOUS ONCE
Refills: 0 | Status: COMPLETED | OUTPATIENT
Start: 2021-05-07 | End: 2021-05-07

## 2021-05-07 RX ORDER — PIPERACILLIN AND TAZOBACTAM 4; .5 G/20ML; G/20ML
3.38 INJECTION, POWDER, LYOPHILIZED, FOR SOLUTION INTRAVENOUS EVERY 8 HOURS
Refills: 0 | Status: DISCONTINUED | OUTPATIENT
Start: 2021-05-07 | End: 2021-05-08

## 2021-05-07 RX ORDER — QUETIAPINE FUMARATE 200 MG/1
50 TABLET, FILM COATED ORAL ONCE
Refills: 0 | Status: DISCONTINUED | OUTPATIENT
Start: 2021-05-07 | End: 2021-05-07

## 2021-05-07 RX ORDER — MAGNESIUM SULFATE 500 MG/ML
2 VIAL (ML) INJECTION ONCE
Refills: 0 | Status: COMPLETED | OUTPATIENT
Start: 2021-05-07 | End: 2021-05-07

## 2021-05-07 RX ORDER — SODIUM CHLORIDE 9 MG/ML
1000 INJECTION, SOLUTION INTRAVENOUS ONCE
Refills: 0 | Status: COMPLETED | OUTPATIENT
Start: 2021-05-07 | End: 2021-05-07

## 2021-05-07 RX ORDER — MORPHINE SULFATE 50 MG/1
2 CAPSULE, EXTENDED RELEASE ORAL EVERY 4 HOURS
Refills: 0 | Status: DISCONTINUED | OUTPATIENT
Start: 2021-05-07 | End: 2021-05-08

## 2021-05-07 RX ORDER — DORZOLAMIDE HYDROCHLORIDE TIMOLOL MALEATE 20; 5 MG/ML; MG/ML
1 SOLUTION/ DROPS OPHTHALMIC
Refills: 0 | Status: DISCONTINUED | OUTPATIENT
Start: 2021-05-07 | End: 2021-05-14

## 2021-05-07 RX ORDER — POTASSIUM CHLORIDE 20 MEQ
40 PACKET (EA) ORAL EVERY 4 HOURS
Refills: 0 | Status: COMPLETED | OUTPATIENT
Start: 2021-05-07 | End: 2021-05-07

## 2021-05-07 RX ORDER — ACETAMINOPHEN 500 MG
650 TABLET ORAL EVERY 6 HOURS
Refills: 0 | Status: DISCONTINUED | OUTPATIENT
Start: 2021-05-07 | End: 2021-05-14

## 2021-05-07 RX ORDER — OXYCODONE HYDROCHLORIDE 5 MG/1
5 TABLET ORAL EVERY 4 HOURS
Refills: 0 | Status: DISCONTINUED | OUTPATIENT
Start: 2021-05-07 | End: 2021-05-08

## 2021-05-07 RX ORDER — TRAZODONE HCL 50 MG
50 TABLET ORAL ONCE
Refills: 0 | Status: COMPLETED | OUTPATIENT
Start: 2021-05-07 | End: 2021-05-07

## 2021-05-07 RX ORDER — ALPRAZOLAM 0.25 MG
0.25 TABLET ORAL ONCE
Refills: 0 | Status: DISCONTINUED | OUTPATIENT
Start: 2021-05-07 | End: 2021-05-07

## 2021-05-07 RX ORDER — MIDODRINE HYDROCHLORIDE 2.5 MG/1
10 TABLET ORAL EVERY 8 HOURS
Refills: 0 | Status: DISCONTINUED | OUTPATIENT
Start: 2021-05-07 | End: 2021-05-08

## 2021-05-07 RX ORDER — OXYCODONE HYDROCHLORIDE 5 MG/1
10 TABLET ORAL EVERY 4 HOURS
Refills: 0 | Status: DISCONTINUED | OUTPATIENT
Start: 2021-05-07 | End: 2021-05-08

## 2021-05-07 RX ORDER — LATANOPROST 0.05 MG/ML
1 SOLUTION/ DROPS OPHTHALMIC; TOPICAL AT BEDTIME
Refills: 0 | Status: DISCONTINUED | OUTPATIENT
Start: 2021-05-07 | End: 2021-05-14

## 2021-05-07 RX ORDER — MIDODRINE HYDROCHLORIDE 2.5 MG/1
10 TABLET ORAL ONCE
Refills: 0 | Status: COMPLETED | OUTPATIENT
Start: 2021-05-07 | End: 2021-05-07

## 2021-05-07 RX ORDER — SODIUM CHLORIDE 9 MG/ML
1000 INJECTION, SOLUTION INTRAVENOUS
Refills: 0 | Status: DISCONTINUED | OUTPATIENT
Start: 2021-05-07 | End: 2021-05-08

## 2021-05-07 RX ORDER — POTASSIUM PHOSPHATE, MONOBASIC POTASSIUM PHOSPHATE, DIBASIC 236; 224 MG/ML; MG/ML
15 INJECTION, SOLUTION INTRAVENOUS ONCE
Refills: 0 | Status: COMPLETED | OUTPATIENT
Start: 2021-05-07 | End: 2021-05-07

## 2021-05-07 RX ORDER — ATENOLOL 25 MG/1
25 TABLET ORAL DAILY
Refills: 0 | Status: DISCONTINUED | OUTPATIENT
Start: 2021-05-07 | End: 2021-05-07

## 2021-05-07 RX ORDER — CEFTRIAXONE 500 MG/1
1000 INJECTION, POWDER, FOR SOLUTION INTRAMUSCULAR; INTRAVENOUS EVERY 24 HOURS
Refills: 0 | Status: DISCONTINUED | OUTPATIENT
Start: 2021-05-07 | End: 2021-05-07

## 2021-05-07 RX ORDER — ATORVASTATIN CALCIUM 80 MG/1
20 TABLET, FILM COATED ORAL AT BEDTIME
Refills: 0 | Status: DISCONTINUED | OUTPATIENT
Start: 2021-05-07 | End: 2021-05-08

## 2021-05-07 RX ORDER — PIPERACILLIN AND TAZOBACTAM 4; .5 G/20ML; G/20ML
3.38 INJECTION, POWDER, LYOPHILIZED, FOR SOLUTION INTRAVENOUS ONCE
Refills: 0 | Status: COMPLETED | OUTPATIENT
Start: 2021-05-07 | End: 2021-05-07

## 2021-05-07 RX ORDER — ONDANSETRON 8 MG/1
4 TABLET, FILM COATED ORAL ONCE
Refills: 0 | Status: COMPLETED | OUTPATIENT
Start: 2021-05-07 | End: 2021-05-07

## 2021-05-07 RX ADMIN — POTASSIUM PHOSPHATE, MONOBASIC POTASSIUM PHOSPHATE, DIBASIC 62.5 MILLIMOLE(S): 236; 224 INJECTION, SOLUTION INTRAVENOUS at 11:46

## 2021-05-07 RX ADMIN — PIPERACILLIN AND TAZOBACTAM 25 GRAM(S): 4; .5 INJECTION, POWDER, LYOPHILIZED, FOR SOLUTION INTRAVENOUS at 21:26

## 2021-05-07 RX ADMIN — Medication 50 MILLIGRAM(S): at 23:02

## 2021-05-07 RX ADMIN — Medication 0.25 MILLIGRAM(S): at 19:52

## 2021-05-07 RX ADMIN — PIPERACILLIN AND TAZOBACTAM 25 GRAM(S): 4; .5 INJECTION, POWDER, LYOPHILIZED, FOR SOLUTION INTRAVENOUS at 14:26

## 2021-05-07 RX ADMIN — PIPERACILLIN AND TAZOBACTAM 25 GRAM(S): 4; .5 INJECTION, POWDER, LYOPHILIZED, FOR SOLUTION INTRAVENOUS at 06:07

## 2021-05-07 RX ADMIN — TAMSULOSIN HYDROCHLORIDE 0.4 MILLIGRAM(S): 0.4 CAPSULE ORAL at 21:27

## 2021-05-07 RX ADMIN — SODIUM CHLORIDE 1000 MILLILITER(S): 9 INJECTION, SOLUTION INTRAVENOUS at 01:48

## 2021-05-07 RX ADMIN — OXYCODONE HYDROCHLORIDE 5 MILLIGRAM(S): 5 TABLET ORAL at 08:13

## 2021-05-07 RX ADMIN — Medication 50 GRAM(S): at 10:33

## 2021-05-07 RX ADMIN — ATORVASTATIN CALCIUM 20 MILLIGRAM(S): 80 TABLET, FILM COATED ORAL at 21:26

## 2021-05-07 RX ADMIN — SODIUM CHLORIDE 2400 MILLILITER(S): 9 INJECTION INTRAMUSCULAR; INTRAVENOUS; SUBCUTANEOUS at 00:11

## 2021-05-07 RX ADMIN — Medication 40 MILLIEQUIVALENT(S): at 17:26

## 2021-05-07 RX ADMIN — CEFTRIAXONE 1000 MILLIGRAM(S): 500 INJECTION, POWDER, FOR SOLUTION INTRAMUSCULAR; INTRAVENOUS at 01:17

## 2021-05-07 RX ADMIN — SODIUM CHLORIDE 2400 MILLILITER(S): 9 INJECTION INTRAMUSCULAR; INTRAVENOUS; SUBCUTANEOUS at 01:17

## 2021-05-07 RX ADMIN — Medication 650 MILLIGRAM(S): at 11:00

## 2021-05-07 RX ADMIN — LATANOPROST 1 DROP(S): 0.05 SOLUTION/ DROPS OPHTHALMIC; TOPICAL at 21:27

## 2021-05-07 RX ADMIN — MIDODRINE HYDROCHLORIDE 10 MILLIGRAM(S): 2.5 TABLET ORAL at 21:26

## 2021-05-07 RX ADMIN — Medication 650 MILLIGRAM(S): at 10:30

## 2021-05-07 RX ADMIN — PIPERACILLIN AND TAZOBACTAM 200 GRAM(S): 4; .5 INJECTION, POWDER, LYOPHILIZED, FOR SOLUTION INTRAVENOUS at 02:18

## 2021-05-07 RX ADMIN — SODIUM CHLORIDE 100 MILLILITER(S): 9 INJECTION, SOLUTION INTRAVENOUS at 23:04

## 2021-05-07 RX ADMIN — MIDODRINE HYDROCHLORIDE 10 MILLIGRAM(S): 2.5 TABLET ORAL at 17:43

## 2021-05-07 RX ADMIN — Medication 40 MILLIEQUIVALENT(S): at 14:26

## 2021-05-07 RX ADMIN — Medication 1000 MILLIGRAM(S): at 01:17

## 2021-05-07 RX ADMIN — ONDANSETRON 4 MILLIGRAM(S): 8 TABLET, FILM COATED ORAL at 04:30

## 2021-05-07 RX ADMIN — OXYCODONE HYDROCHLORIDE 5 MILLIGRAM(S): 5 TABLET ORAL at 08:43

## 2021-05-07 RX ADMIN — SODIUM CHLORIDE 1000 MILLILITER(S): 9 INJECTION INTRAMUSCULAR; INTRAVENOUS; SUBCUTANEOUS at 09:06

## 2021-05-07 RX ADMIN — Medication 40 MILLIEQUIVALENT(S): at 10:32

## 2021-05-07 RX ADMIN — SODIUM CHLORIDE 1000 MILLILITER(S): 9 INJECTION, SOLUTION INTRAVENOUS at 14:45

## 2021-05-07 RX ADMIN — SODIUM CHLORIDE 100 MILLILITER(S): 9 INJECTION, SOLUTION INTRAVENOUS at 03:00

## 2021-05-07 NOTE — H&P ADULT - HISTORY OF PRESENT ILLNESS
81M with HTN, BPH who presents with hematuria and weakness.  Patient had a cystoscopy about 2 weeks ago (for evaluation for chronic bacteriuria) and since then has had intermittent hematuria.  Was given 3 days of an abx post-procedure.  Then today (Thursday), patient noted gross blood from his urine.  Patient also started to have chills, fevers, and nausea.  No pain.  Brought here for further evaluation.  In the ED, patient's   81M with HTN, BPH who presents with hematuria and weakness.  Patient had a cystoscopy about 2 weeks ago (for evaluation for chronic bacteriuria) and since then has had intermittent hematuria.  Was given 3 days of an abx post-procedure.  Then today (Thursday), patient noted gross blood from his urine.  Patient also started to have chills, fevers, and nausea.  No pain.  No recent sick contacts.   COVID vaccine with Pfizer was done >1 month ago.  No other complaints.  Brought here for further evaluation.  In the ED, patient's triage VS were /65  HR 97  RR 24, 95%, T 101.2F.  Physical exam revealed that he was becoming altered (did not know where he was).  Labs were significant for leukopenia at WBC 0.78, Cr 1.34, lactate 2.3, and UA with +nitrite, mod LE with 0-2 WBC, large blood with >50 RBC, occasional bacteria.  Was started on ceftriaxone empirically.  CT renal showed possible cystitis, multiple bladder diverticula with likely layering calcifications within the posterior diverticula and along the posterior bladder wall, fullness in left renal pelvis.  Patient also had a head CT for AMS.  However, niece said that the patient was more lucid after the fluids and is no longer altered and responding accordingly.  Patient is being admitted for sepsis 2/2 UTI/ infection.

## 2021-05-07 NOTE — H&P ADULT - NSHPPHYSICALEXAM_GEN_ALL_CORE
PHYSICAL EXAM:  Vital Signs Last 24 Hrs  T(C): 38 (07 May 2021 00:18), Max: 38.4 (06 May 2021 21:50)  T(F): 100.4 (07 May 2021 00:18), Max: 101.2 (06 May 2021 21:50)  HR: 88 (07 May 2021 00:18) (88 - 99)  BP: 105/60 (07 May 2021 00:18) (82/50 - 152/65)  BP(mean): --  RR: 18 (07 May 2021 00:18) (18 - 24)  SpO2: 96% (07 May 2021 00:18) (95% - 98%)    GENERAL:     toxic appearing but in NAD  HEAD:     atraumatic  EYES:  EOMI  RESPIRATORY:     clear to auscultation bilaterally, no rales or rhonchi or wheezing or rubs  CARDIOVASCULAR:     regular rate and rhythm, no murmurs or rubs or gallops, 2+ peripheral pulses  GASTROINTESTINAL:     soft, nontender, nondistended, no hepatosplenomegaly palpated, bowel sounds present  EXTREMITIES:     no clubbing or cyanosis or edema  MUSCULOSKELETAL:     no joint pain or swelling or deformities  NERVOUS SYSTEM:     grossly intact, moves all 4s  SKIN:     no rashes or lesions  PSYCH:     sleepy but arousable and appropriate when asked questions

## 2021-05-07 NOTE — CONSULT NOTE ADULT - SUBJECTIVE AND OBJECTIVE BOX
Date/Time Patient Seen:  		  Referring MD:   Data Reviewed	       Patient is a 81y old  Male who presents with a chief complaint of hematuria, nausea, weakness (07 May 2021 12:07)      Subjective/HPI    in bed  seen and examined  vs and meds reviewed  labs reviewed  h and p reviewed  er provider note reviewed  imaging reviewed  on o2 support - in [facility] - anxious -     81M with HTN, BPH who presents with hematuria and weakness.  Patient had a cystoscopy about 2 weeks ago (for evaluation for chronic bacteriuria) and since then has had intermittent hematuria.  Was given 3 days of an abx post-procedure.  Then today (Thursday), patient noted gross blood from his urine.  Patient also started to have chills, fevers, and nausea.  No pain.  No recent sick contacts.   COVID vaccine with Pfizer was done >1 month ago.  No other complaints.  Brought here for further evaluation.  In the ED, patient's triage VS were /65  HR 97  RR 24, 95%, T 101.2F.  Physical exam revealed that he was becoming altered (did not know where he was).  Labs were significant for leukopenia at WBC 0.78, Cr 1.34, lactate 2.3, and UA with +nitrite, mod LE with 0-2 WBC, large blood with >50 RBC, occasional bacteria.  Was started on ceftriaxone empirically.  CT renal showed possible cystitis, multiple bladder diverticula with likely layering calcifications within the posterior diverticula and along the posterior bladder wall, fullness in left renal pelvis.  Patient also had a head CT for AMS.  However, niece said that the patient was more lucid after the fluids and is no longer altered and responding accordingly.  Patient is being admitted for sepsis 2/2 UTI/ infection.      PAST SURGICAL HISTORY:  History of prostate surgery.     Social History:  Social History (marital status, living situation, occupation, tobacco use, alcohol and drug use, and sexual history): - no toxic habits such as smoking, etoh use, or illegal drug use  - lives with wife     Tobacco Screening:  · Core Measure Site	No  · Has the patient used tobacco in the past 30 days?	No     Risk Assessment:    Present on Admission:  Deep Venous Thrombosis	no   Pulmonary Embolus	no      Heart Failure:  Does this patient have a history of or has been diagnosed with heart failure? unknown.       PAST MEDICAL & SURGICAL HISTORY:  Hypertension    Hyperlipidemia    BPH (benign prostatic hyperplasia)    History of prostate surgery          Medication list         MEDICATIONS  (STANDING):  ATENolol  Tablet 25 milliGRAM(s) Oral daily  atorvastatin 20 milliGRAM(s) Oral at bedtime  dorzolamide 2%/timolol 0.5% Ophthalmic Solution 1 Drop(s) Both EYES two times a day  lactated ringers. 1000 milliLiter(s) (100 mL/Hr) IV Continuous <Continuous>  latanoprost 0.005% Ophthalmic Solution 1 Drop(s) Both EYES at bedtime  piperacillin/tazobactam IVPB.. 3.375 Gram(s) IV Intermittent every 8 hours  tamsulosin 0.4 milliGRAM(s) Oral at bedtime    MEDICATIONS  (PRN):  acetaminophen   Tablet .. 650 milliGRAM(s) Oral every 6 hours PRN Temp greater or equal to 38C (100.4F), Mild Pain (1 - 3)  morphine  - Injectable 2 milliGRAM(s) IV Push every 4 hours PRN Breakthrough Pain  oxyCODONE    IR 5 milliGRAM(s) Oral every 4 hours PRN Moderate Pain (4 - 6)  oxyCODONE    IR 10 milliGRAM(s) Oral every 4 hours PRN Severe Pain (7 - 10)         Vitals log        ICU Vital Signs Last 24 Hrs  T(C): 37.3 (07 May 2021 18:31), Max: 38.4 (06 May 2021 21:50)  T(F): 99.2 (07 May 2021 18:31), Max: 101.2 (06 May 2021 21:50)  HR: 100 (07 May 2021 18:31) (73 - 106)  BP: 83/48 (07 May 2021 18:31) (82/50 - 152/65)  BP(mean): --  ABP: --  ABP(mean): --  RR: 18 (07 May 2021 18:31) (17 - 24)  SpO2: 93% (07 May 2021 18:31) (93% - 98%)           Input and Output:  I&O's Detail    06 May 2021 07:01  -  07 May 2021 07:00  --------------------------------------------------------  IN:    IV PiggyBack: 50 mL    Lactated Ringers: 500 mL    Oral Fluid: 240 mL  Total IN: 790 mL    OUT:    Voided (mL): 75 mL  Total OUT: 75 mL    Total NET: 715 mL      07 May 2021 07:01  -  07 May 2021 20:28  --------------------------------------------------------  IN:    Lactated Ringers: 1000 mL    Lactated Ringers Bolus: 1000 mL    Sodium Chloride 0.9% Bolus: 1000 mL  Total IN: 3000 mL    OUT:    Voided (mL): 800 mL  Total OUT: 800 mL    Total NET: 2200 mL          Lab Data                        10.6   3.85  )-----------( 111      ( 07 May 2021 07:26 )             32.3     05-07    136  |  107  |  31<H>  ----------------------------<  89  3.8   |  15<L>  |  2.31<H>    Ca    7.3<L>      07 May 2021 16:24  Phos  3.1     05-07  Mg     1.7     05-07    TPro  5.3<L>  /  Alb  2.6<L>  /  TBili  1.5<H>  /  DBili  x   /  AST  226<H>  /  ALT  142<H>  /  AlkPhos  83  05-07            Review of Systems	  anxious      Objective     Physical Examination    anxious  heart s1s2  lung dec BS  abd soft  head nc  head at  verbal    Pertinent Lab findings & Imaging      Christopher:  NO   Adequate UO     I&O's Detail    06 May 2021 07:01  -  07 May 2021 07:00  --------------------------------------------------------  IN:    IV PiggyBack: 50 mL    Lactated Ringers: 500 mL    Oral Fluid: 240 mL  Total IN: 790 mL    OUT:    Voided (mL): 75 mL  Total OUT: 75 mL    Total NET: 715 mL      07 May 2021 07:01  -  07 May 2021 20:28  --------------------------------------------------------  IN:    Lactated Ringers: 1000 mL    Lactated Ringers Bolus: 1000 mL    Sodium Chloride 0.9% Bolus: 1000 mL  Total IN: 3000 mL    OUT:    Voided (mL): 800 mL  Total OUT: 800 mL    Total NET: 2200 mL               Discussed with:     Cultures:	        Radiology      EXAM:  US ABDOMEN COMPLETE                                  PROCEDURE DATE:  05/07/2021          INTERPRETATION:  CLINICAL INFORMATION: Sepsis, elevated LFTs. Possible pyelonephritis.    COMPARISON: CT abdomen/pelvis May 06, 2021    TECHNIQUE: Sonography of the abdomen.    FINDINGS:    Liver: Mildly enlarged, measuring 19.1 cm. Uniform parenchymal echogenicity. No focal lesion. Smooth hepatic contours.  Bile ducts: Normal caliber. Common bile duct measures 6 mm.  Gallbladder: No stones or pericholecystic fluid. Nonspecific wall thickening, measuring 5 mm.  Pancreas: Visualized portions are within normal limits.  Spleen: 9.9 cm. Within normal limits.  Right kidney: 11.1 cm. No hydronephrosis.  Left kidney: 10.3 cm.  No hydronephrosis.  Ascites: None.  Aorta and IVC: Visualized portions are within normal limits.  Other: Trace right pleural effusion.    IMPRESSION:  Mild hepatomegaly.  Nonspecific gallbladder wall thickening.  Right pleural effusion.                PATRIA BARKSDALE MD; Attending Radiologist  This document has been electronically signed. May  7 2021  4:58PM        EXAM:  XR CHEST PORTABLE URGENT 1V                          EXAM:  XR CHEST PORTABLE ROUTINE 1V                                  PROCEDURE DATE:  05/07/2021          INTERPRETATION:  Portable chest radiograph    CLINICAL INFORMATION: Sepsis    TECHNIQUE:  Portable  AP view of the chest was obtained.    COMPARISON: 3/2/2020 available for review.    FINDINGS:    The lungs show mild perihilar and RIGHT greater than LEFT basilar diffuse airspace disease with bronchial wall thickening  concerning for bronchiectasis. No large airspace consolidation or effusion.  No pneumothorax.    There is mild cardiomegaly.    Healed RIGHT mid clavicle fracture deformity.      IMPRESSION:   Bilateral mild diffuse airspace disease with bronchial wall thickening....    FOLLOW-UP AP PORTABLE CHEST RADIOGRAPH 5/7/2021 5:26 PM:  No interval change. Bilateral diffuse infiltrates with bronchial wall thickening.              MATTHEW PEOPLES MD; Attending Radiologist  This document has been electronically signed. May  7 2021  6:01PM      EXAM:  CT BRAIN                                  PROCEDURE DATE:  05/07/2021          INTERPRETATION:  Clinical Indication: Altered mental status.    Comparison: 2/1/2018 MRI and 12/30/2015 CT    Technique: Noncontrast axial CT images of the head were acquired. Coronal and sagittal reformats were obtained.    Findings:  The ventricles and sulci are prominent in size, compatible with mild generalized parenchymal volume loss. No acute intracranial hemorrhage is identified.  There is no extra-axial fluid collection. No mass effect or midline shift is seen.  There is no evidence of acute territorial infarct.  There are periventricular and subcortical white matter hypodensities, which are nonspecific, but likely reflect mild microvascular ischemic disease.  There is mild pansinus mucosal thickening most pronounced in the maxillary and ethmoid sinuses with partial ethmoid opacification also noted. The mastoid air cells are well aerated.  No acute osseous abnormality is seen.      Impression: No evidence of acute intracranial abnormality.              DAYSI GUTIERREZ MD; Attending Radiologist  This document has been electronically signed. May  7 2021  1:31AM      EXAM:  CT RENAL STONE HUNT                                  PROCEDURE DATE:  05/06/2021          INTERPRETATION:  CLINICAL INFORMATION: Flank pain. BIB family for fever and hematuria, had cystoscopy a couple of weeks ago, was on abx for 3d after, had occ sm amt of hematuria after, tonight about 2 hr PTA had yudelka hematuria and chills, has also had n/v. Evaluate for kidney stone.    COMPARISON: None.    CONTRAST/COMPLICATIONS:  IV Contrast: None  Oral Contrast: None  Complications: None    TECHNIQUE: CT scan of the abdomen and pelvis was performed from the domes of the diaphragm to the symphysis pubis without oral or intravenous contrast. Coronal and sagittal reformatted images are provided.    FINDINGS:  There is motion artifact present which degrades image quality and limits evaluation.    The heart is not enlarged. There is no pericardial effusion. Mucoid impacted distal airways left lung base.    Evaluation of the parenchymal organs and vascular structures is limited without intravenous contrast.    Allowing for motion no intrarenal or ureteral calculus is identified. There is fullness of the left renal pelvis without significant perinephric stranding. No significant perinephric stranding or hydronephrosis on the right. The unenhanced appearance of the liver, gallbladder, pancreas and spleen are unremarkable aside from gallbladder sludge.    The small and large bowel are normal in caliber without evidence of obstruction. The appendix is normal. There is no colonic wall thickening or pericolonic inflammatory change suggested.    The abdominal aorta is normal in caliber. There is no retroperitoneal, pelvic or inguinal adenopathy.    There is circumferential bladder wall thickening for the degree of distention and some stranding in the perivesicular fat. There are multiple bladder diverticula, the posterior ones containing layering high density material likely layering calcifications. The largest is on the right measuring 6.0 x 5.7 cm. The prostate gland is upper limits normal in size at 5 cm transaxial dimension.    Degenerative changes are seen throughout the lumbar spine. There are no acute osseous abnormalities.    IMPRESSION:  Motion degraded exam. No evidence of nephrolithiasis or obstructive uropathy. Bladder wall thickening with perivesicular fat stranding suggesting cystitis. Multiple bladder diverticula with likely layering calcifications within the posterior diverticula and along the posterior bladder wall. Fullness left renal pelvis. Please correlate clinically with urinalysis and urine culture.              DAYSI CASTILLO MD; Attending Radiologist  This document has been electronically signed. May  7 2021 12:24AM

## 2021-05-07 NOTE — ED ADULT NURSE REASSESSMENT NOTE - NS ED NURSE REASSESS COMMENT FT1
Pt sent to [facility]. PT daughter recently left, translated for patient, stating patient is more alert baseline, able to state where he is and why. pt still febrile to 100.4, no longer tachycardic, breathing well on 2 liters of oxygen. antibiotics running through IV. Report provided to RN, awaiting transport

## 2021-05-07 NOTE — PROGRESS NOTE ADULT - SUBJECTIVE AND OBJECTIVE BOX
Patient is a 81y old  Male who presents with a chief complaint of hematuria, nausea, weakness (07 May 2021 01:13)    INTERVAL HPI/OVERNIGHT EVENTS:  patient had back pain earlier today, improved with oxy, but now complaining of full body aches and has rigors.   also has been having a dry cough.  was able to to eat some breakfast; has been very thirsty  speaks farsi - wanted to continue to use niece as     MEDICATIONS  (STANDING):  ATENolol  Tablet 25 milliGRAM(s) Oral daily  atorvastatin 20 milliGRAM(s) Oral at bedtime  lactated ringers. 1000 milliLiter(s) (100 mL/Hr) IV Continuous <Continuous>  piperacillin/tazobactam IVPB.. 3.375 Gram(s) IV Intermittent every 8 hours  potassium chloride    Tablet ER 40 milliEquivalent(s) Oral every 4 hours  potassium phosphate IVPB 15 milliMole(s) IV Intermittent once  tamsulosin 0.4 milliGRAM(s) Oral at bedtime    MEDICATIONS  (PRN):  acetaminophen   Tablet .. 650 milliGRAM(s) Oral every 6 hours PRN Temp greater or equal to 38C (100.4F), Mild Pain (1 - 3)  morphine  - Injectable 2 milliGRAM(s) IV Push every 4 hours PRN Breakthrough Pain  oxyCODONE    IR 5 milliGRAM(s) Oral every 4 hours PRN Moderate Pain (4 - 6)  oxyCODONE    IR 10 milliGRAM(s) Oral every 4 hours PRN Severe Pain (7 - 10)    Allergies  No Known Allergies    REVIEW OF SYSTEMS:  CONSTITUTIONAL: No fever, weight loss, or fatigue  EYES: No eye pain, visual disturbances, or discharge  ENMT:  No difficulty hearing, tinnitus, vertigo; No sinus or throat pain  NECK: No pain or stiffness  BREASTS: No pain, masses, or nipple discharge  RESPIRATORY: No wheezing, chills or hemoptysis; No shortness of breath  CARDIOVASCULAR: No chest pain, palpitations, lightheadedness, or leg swelling  GASTROINTESTINAL: No abdominal or epigastric pain. No nausea, vomiting, or hematemesis; No diarrhea or constipation. No melena or hematochezia.  GENITOURINARY: No dysuria, frequency, hematuria, or incontinence  NEUROLOGICAL: No headaches, memory loss, vertigo, loss of strength, numbness, or tremors  SKIN: No itching, burning, rashes, or lesions   LYMPH NODES: No enlarged glands  ENDOCRINE: No heat or cold intolerance; No hair loss; No polydipsia or polyuria  MUSCULOSKELETAL: No joint pain or swelling;   PSYCHIATRIC: No depression, anxiety, or mood swings  HEME/LYMPH: No easy bruising, or bleeding gums  ALLERGY AND IMMUNOLOGIC: No hives or eczema    Vital Signs Last 24 Hrs  T(C): 37.3 (07 May 2021 08:09), Max: 38.4 (06 May 2021 21:50)  T(F): 99.1 (07 May 2021 08:09), Max: 101.2 (06 May 2021 21:50)  HR: 106 (07 May 2021 08:09) (78 - 106)  BP: 142/69 (07 May 2021 08:09) (82/50 - 152/65)  BP(mean): --  RR: 19 (07 May 2021 05:47) (18 - 24)  SpO2: 95% (07 May 2021 05:47) (95% - 98%)    PHYSICAL EXAM:  GENERAL: NAD, well-groomed, well-developed  HEAD:  Atraumatic, Normocephalic  EYES:  conjunctiva and sclera clear  ENMT: Moist mucous membranes  NECK: Supple, No JVD  NERVOUS SYSTEM:  Alert & Oriented X3, Good concentration; All 4 extremities mobile, no gross sensory deficits.   CHEST/LUNG: Clear to auscultation bilaterally;   HEART: Regular rate and rhythm; No murmurs, rubs, or gallops  ABDOMEN: Soft, Nontender, Nondistended; Bowel sounds present  EXTREMITIES:  2+ Peripheral Pulses, No clubbing, cyanosis, or edema      LABS:                        10.6   3.85  )-----------( 111      ( 07 May 2021 07:26 )             32.3     07 May 2021 07:26    140    |  109    |  26     ----------------------------<  98     3.0     |  19     |  1.74     Ca    7.7        07 May 2021 07:26  Phos  1.4       07 May 2021 07:26  Mg     1.3       07 May 2021 07:26    TPro  5.3    /  Alb  2.6    /  TBili  1.5    /  DBili  x      /  AST  226    /  ALT  142    /  AlkPhos  83     07 May 2021 07:26    PT/INR - ( 07 May 2021 07:26 )   PT: 16.2 sec;   INR: 1.36 ratio    PTT - ( 07 May 2021 07:26 )  PTT:37.5 sec    Urinalysis Basic - ( 06 May 2021 22:26 )  Color: Sabiha / Appearance: Turbid / S.015 / pH: x  Gluc: x / Ketone: Negative  / Bili: Negative / Urobili: Negative mg/dL   Blood: x / Protein: 100 mg/dL / Nitrite: Positive   Leuk Esterase: Moderate / RBC: >50 /HPF / WBC 0-2   Sq Epi: x / Non Sq Epi: Occasional / Bacteria: Occasional      CAPILLARY BLOOD GLUCOSE          RADIOLOGY & ADDITIONAL TESTS:    Imaging Personally Reviewed:  [x ] YES   CT head - no intracranial abnormality     Consultant(s) Notes Reviewed:      Care Discussed with Consultants/Other Providers:  PMD Dr Shaik Reyes [phone]    Advanced Directives: [ ] DNR  [ ] No feeding tube  [ ] MOLST in chart  [ ] MOLST completed today  [ ] Unknown

## 2021-05-07 NOTE — H&P ADULT - ASSESSMENT
81M with HTN, BPH who presents with hematuria and weakness.  Meets severe sepsis criteria (leukopenia, fever, lactate, source of infection)    Severe sepsis 2/2 likely  infection  - admitted to medicine  - change ceftriaxone to zosyn for broader coverage (has severe neutropenia)  - check urine and blood cultures  - ordered another 1L bolus (repeat lactate was 2.2)  - repeat lactate after bolus  - continue with IVF with LR @ 100cc/hr  - monitor UOP  - f/u head CT (AMS likely delirium from sepsis)  - ID consult    Severe neutropenia -  - possible from sepsis  - monitor WBC  - if does not improve, may need hematology consult    HORACIO - likely from sepsis/hypovolemia  - received bolus and will be on IVF   - repeat BMP    HTN  - cont with atenolol with hold parameters  - hold ASA 2/2 hematuria  - cont with atorvastatin    BPH  - cont with tamsulosin    Preventive measures  - no AC for DVT ppx 2/2 hematuria  - SCD only

## 2021-05-07 NOTE — H&P ADULT - NSHPREVIEWOFSYSTEMS_GEN_ALL_CORE
REVIEW OF SYSTEMS:  CONSTITUTIONAL:    +fevers, +chills, +weakness  EYES:    no eye pain or visual disturbances or discharge  ENMT:     no difficulty hearing or tinnitus or vertigo, no sinus or throat pain  NECK:    no pain or stiffness  RESPIRATORY:    no cough or wheezing or chills or hemoptysis, no shortness of breath  CARDIOVASCULAR:    no chest pain or palpitations or dizziness or leg swelling  GASTROINTESTINAL:    no abdominal or epigastric pain. no nausea or vomiting or hematemesis, no diarrhea or constipation. no melena or hematochezia.  GENITOURINARY:    +hematuria  NEUROLOGICAL:    no headaches or memory loss or loss of strength or numbness or tremors  SKIN:    no itching or burning or rashes or lesions   LYMPH NODES:    no enlarged glands  ENDOCRINE:    no heat or cold intolerance, no hair loss, no polydipsia or polyuria  MUSCULOSKELETAL:    no joint pain or swelling, no muscle or back or extremity pain  PSYCHIATRIC:    no depression or anxiety or mood swings or difficulty sleeping  HEME/LYMPH:    no easy bruising or bleeding gums  ALLERGY AND IMMUNOLOGIC:    no hives or eczema

## 2021-05-07 NOTE — PROGRESS NOTE ADULT - ASSESSMENT
81M with HTN, BPH who presents with hematuria and weakness.  Meets severe sepsis criteria (leukopenia, fever, lactate, source of infection)    Severe sepsis 2/2 likely  infection  - admitted to medicine  - ID consult in progress - Abx per ID  - f/u urine and blood cultures  - ordered another 1L bolus - it is concerning that lactate is not clearing.  next ordered for 4pm.  - continue with IVF with LR @ 100cc/hr  - monitor UOP, check bladder scan  - AMS likely delirium from sepsis- has been clearing mentally.  continue to monitor  - elevated lfts noted, likely hypoperfusion from hypotension but will check Abd US to rule out biliary inf    Severe neutropenia - now bandemia  - likely acute secondary to sepsis  - monitor WBC  - d/w PMD Dr Reyes - this is new, WBC has been normal in the past.     CKD  - As per Dr Reyes baseline creatinine ranges from 1.2-1.7  - continue to monitor  - hypokalemia, hypomagnesemia, hypophosphatemia - repletion ordered    HTN  - cont with atenolol with hold parameters  - hold ASA 2/2 hematuria  - cont with atorvastatin    BPH  - cont with tamsulosin    Preventive measures  - no AC for DVT ppx 2/2 hematuria  - SCD only for now  - ambulate if tolerates

## 2021-05-07 NOTE — H&P ADULT - NSHPLABSRESULTS_GEN_ALL_CORE
LABS:                        12.7<L>  0.78<LL> )-----------( 204      ( 06 May 2021 22:38 )             38.2<L>    140    |  104    |  24<H>  ----------------------------<  127<H>    06 May 2021 22:38  3.9     |  23     |  1.34<H>        Ca 8.8           06 May 2021 22:38        TPro  7.3    /  Alb  3.7    /  TBili  0.6    /  DBili  x      /  AST  22     /  ALT  24     /  AlkPhos  85     06 May 2021 22:38    PT/INR - ( 06 May 2021 22:38 )   PT: 13.4 ;   INR: 1.11          PTT - ( 06 May 2021 22:38 )  PTT:27.6     Urinalysis Basic - ( 06 May 2021 22:26 )    Color: Sabiha / Appearance: Turbid / S.015 / pH: x  Gluc: x / Ketone: Negative  / Bili: Negative / Urobili: Negative mg/dL   Blood: x / Protein: 100 mg/dL / Nitrite: Positive   Leuk Esterase: Moderate / RBC: >50 /HPF / WBC 0-2   Sq Epi: x / Non Sq Epi: Occasional / Bacteria: Occasional    Troponin trend:    EKG:  NSR with 1st degree AV block (HI 234ms), nonspecific TW changes (fTW in I, aVL), sub-mm ST depressions in V5, II  Radiology:  < from: CT Renal Stone Hunt (21 @ 23:30) >    IMPRESSION:  Motion degraded exam. No evidence of nephrolithiasis or obstructive uropathy. Bladder wall thickening with perivesicular fat stranding suggesting cystitis. Multiple bladder diverticula with likely layering calcifications within the posterior diverticula and along the posterior bladder wall. Fullness left renal pelvis. Please correlate clinically with urinalysis and urine culture.    < end of copied text >

## 2021-05-07 NOTE — CHART NOTE - NSCHARTNOTEFT_GEN_A_CORE
Patient seen and upgraded to [facility] due to hypotension, elevated lactate. Will insert White for accurate I/O.  Will most likely need pressors. Give one dose Midodrine 10mg STAT.

## 2021-05-07 NOTE — CONSULT NOTE ADULT - SUBJECTIVE AND OBJECTIVE BOX
HPI:  81M with HTN, BPH recent cystoscopy about 2 weeks ago (for evaluation for chronic bacteriuria) and since then has had intermittent hematuria who presents with hematuria and weakness with fever chills and n/v. In ED Tmax 101.2F. WBC 0.78, Cr 1.34, lactate 2.3, and UA with +nitrite, mod LE with 0-2 WBC, large blood with >50 RBC, occasional bacteria.   CT renal showed possible cystitis, multiple bladder diverticula.     Infectious Disease consult was called to evaluate pt and for antibiotic management    Past Medical & Surgical Hx:  PAST MEDICAL & SURGICAL HISTORY:  Hypertension  Hyperlipidemia  BPH (benign prostatic hyperplasia)  History of prostate surgery      Social History--  EtOH: denies   Tobacco: denies   Drug Use: denies       FAMILY HISTORY:  NONE    Allergies  No Known Allergies    Intolerances  NONE      Home Medications:  aspirin 81 mg oral tablet: 81 milligram(s) orally once a day (07 May 2021 00:48)  atenolol: 20 milligram(s) orally once a day (07 May 2021 00:48)  Flomax 0.4 mg oral capsule: 1 cap(s) orally once a day (07 May 2021 00:48)  Lipitor 20 mg oral tablet: 1 tab(s) orally once a day (07 May 2021 00:48)      Current Inpatient Medications :    ANTIBIOTICS:   piperacillin/tazobactam IVPB.. 3.375 Gram(s) IV Intermittent every 8 hours      OTHER RELEVANT MEDICATIONS :  acetaminophen   Tablet .. 650 milliGRAM(s) Oral every 6 hours PRN  ATENolol  Tablet 25 milliGRAM(s) Oral daily  atorvastatin 20 milliGRAM(s) Oral at bedtime  lactated ringers. 1000 milliLiter(s) IV Continuous <Continuous>  morphine  - Injectable 2 milliGRAM(s) IV Push every 4 hours PRN  oxyCODONE    IR 5 milliGRAM(s) Oral every 4 hours PRN  oxyCODONE    IR 10 milliGRAM(s) Oral every 4 hours PRN  potassium chloride    Tablet ER 40 milliEquivalent(s) Oral every 4 hours  tamsulosin 0.4 milliGRAM(s) Oral at bedtime      ROS:  Unable to obtain due to : poor historian       I&O's Detail    06 May 2021 07:01  -  07 May 2021 07:00  --------------------------------------------------------  IN:    IV PiggyBack: 50 mL    Lactated Ringers: 500 mL    Oral Fluid: 240 mL  Total IN: 790 mL    OUT:    Voided (mL): 75 mL  Total OUT: 75 mL    Total NET: 715 mL    Physical Exam:  Vital Signs Last 24 Hrs  T(C): 36.2 (07 May 2021 10:00), Max: 38.4 (06 May 2021 21:50)  T(F): 97.2 (07 May 2021 10:00), Max: 101.2 (06 May 2021 21:50)  HR: 73 (07 May 2021 10:00) (73 - 106)  BP: 95/55 (07 May 2021 10:00) (82/50 - 152/65)  RR: 18 (07 May 2021 10:00) (18 - 24)  SpO2: 96% (07 May 2021 10:00) (95% - 98%)  Height (cm): 176 (05-06 @ 21:50)  Weight (kg): 76 (05-06 @ 21:50)  BMI (kg/m2): 24.5 (05-06 @ 21:50)  BSA (m2): 1.92 (05-06 @ 21:50)    General: no acute distress  Neck: supple, trachea midline  Lungs: clear, no wheeze/rhonchi  Cardiovascular: regular rate and rhythm, S1 S2  Abdomen: soft, nontender, ND, bowel sounds normal  Neurological:  alert and oriented x3  Skin: no rash  Extremities: no cyanosis/clubbing/edema    Labs:                         10.6   3.85  )-----------( 111      ( 07 May 2021 07:26 )             32.3   05-07    140  |  109<H>  |  26<H>  ----------------------------<  98  3.0<L>   |  19<L>  |  1.74<H>    Ca    7.7<L>      07 May 2021 07:26  Phos  1.4     05-07  Mg     1.3     05-07    TPro  5.3<L>  /  Alb  2.6<L>  /  TBili  1.5<H>  /  DBili  x   /  AST  226<H>  /  ALT  142<H>  /  AlkPhos  83  05-07      RECENT CULTURES:          RADIOLOGY & ADDITIONAL STUDIES:    EXAM:  CT RENAL STONE HUNT                                  PROCEDURE DATE:  05/06/2021          INTERPRETATION:  CLINICAL INFORMATION: Flank pain. BIB family for fever and hematuria, had cystoscopy a couple of weeks ago, was on abx for 3d after, had occ sm amt of hematuria after, tonight about 2 hr PTA had yudelka hematuria and chills, has also had n/v. Evaluate for kidney stone.    COMPARISON: None.    CONTRAST/COMPLICATIONS:  IV Contrast: None  Oral Contrast: None  Complications: None    TECHNIQUE: CT scan of the abdomen and pelvis was performed from the domes of the diaphragm to the symphysis pubis without oral or intravenous contrast. Coronal and sagittal reformatted images are provided.    FINDINGS:  There is motion artifact present which degrades image quality and limits evaluation.    The heart is not enlarged. There is no pericardial effusion. Mucoid impacted distal airways left lung base.    Evaluation of the parenchymal organs and vascular structures is limited without intravenous contrast.    Allowing for motion no intrarenal or ureteral calculus is identified. There is fullness of the left renal pelvis without significant perinephric stranding. No significant perinephric stranding or hydronephrosis on the right. The unenhanced appearance of the liver, gallbladder, pancreas and spleen are unremarkable aside from gallbladder sludge.    The small and large bowel are normal in caliber without evidence of obstruction. The appendix is normal. There is no colonic wall thickening or pericolonic inflammatory change suggested.    The abdominal aorta is normal in caliber. There is no retroperitoneal, pelvic or inguinal adenopathy.    There is circumferential bladder wall thickening for the degree of distention and some stranding in the perivesicular fat. There are multiple bladder diverticula, the posterior ones containing layering high density material likely layering calcifications. The largest is on the right measuring 6.0 x 5.7 cm. The prostate gland is upper limits normal in size at 5 cm transaxial dimension.    Degenerative changes are seen throughout the lumbar spine. There are no acute osseous abnormalities.    IMPRESSION:  Motion degraded exam. No evidence of nephrolithiasis or obstructive uropathy. Bladder wall thickening with perivesicular fat stranding suggesting cystitis. Multiple bladder diverticula with likely layering calcifications within the posterior diverticula and along the posterior bladder wall. Fullness left renal pelvis. Please correlate clinically with urinalysis and urine culture.      Assessment :   81M with HTN, BPH recent cystoscopy about 2 weeks ago (for evaluation for chronic bacteriuria) admitted with acute cystitis with hematuria.  In ED Tmax 101.2F. WBC 0.78  Abn LFTs and neutropenia likely sec sepsis  HORACIO    Plan :   Cont Zosyn  Fu cultures  Trend temps and cbc  Bladder scan  IVF    Continue with present regiment .  Approptiate use of antibiotics and adverse effects reviewed.      I have discussed the above plan of care with patient's niece in detail. She expressed understanding of the treatment plan . Risks, benefits and alternatives discussed in detail. I have asked if she has any questions or concerns and appropriately addressed them to the best of my ability .      > 45 minutes spent in direct patient care reviewing  the notes, lab data/ imaging , discussion with multidisciplinary team. All questions were addressed and answered to the best of my capacity .    Thank you for allowing me to participate in the care of your patient .      Frank Hernandez MD  Infectious Disease  [phone]

## 2021-05-08 DIAGNOSIS — N17.9 ACUTE KIDNEY FAILURE, UNSPECIFIED: ICD-10-CM

## 2021-05-08 DIAGNOSIS — A41.9 SEPSIS, UNSPECIFIED ORGANISM: ICD-10-CM

## 2021-05-08 DIAGNOSIS — R45.1 RESTLESSNESS AND AGITATION: ICD-10-CM

## 2021-05-08 DIAGNOSIS — N39.0 URINARY TRACT INFECTION, SITE NOT SPECIFIED: ICD-10-CM

## 2021-05-08 LAB
-  STREPTOCOCCUS ANGINOSUS GROUP: SIGNIFICANT CHANGE UP
ALBUMIN SERPL ELPH-MCNC: 2.2 G/DL — LOW (ref 3.3–5)
ALBUMIN SERPL ELPH-MCNC: 2.5 G/DL — LOW (ref 3.3–5)
ALBUMIN SERPL ELPH-MCNC: 2.5 G/DL — LOW (ref 3.3–5)
ALP SERPL-CCNC: 40 U/L — SIGNIFICANT CHANGE UP (ref 30–120)
ALP SERPL-CCNC: 45 U/L — SIGNIFICANT CHANGE UP (ref 30–120)
ALP SERPL-CCNC: 55 U/L — SIGNIFICANT CHANGE UP (ref 30–120)
ALT FLD-CCNC: 102 U/L DA — HIGH (ref 10–60)
ALT FLD-CCNC: 110 U/L DA — HIGH (ref 10–60)
ALT FLD-CCNC: 118 U/L DA — HIGH (ref 10–60)
ANION GAP SERPL CALC-SCNC: 12 MMOL/L — SIGNIFICANT CHANGE UP (ref 5–17)
ANION GAP SERPL CALC-SCNC: 13 MMOL/L — SIGNIFICANT CHANGE UP (ref 5–17)
ANION GAP SERPL CALC-SCNC: 13 MMOL/L — SIGNIFICANT CHANGE UP (ref 5–17)
APPEARANCE UR: CLEAR — SIGNIFICANT CHANGE UP
APTT BLD: 43.3 SEC — HIGH (ref 27.5–35.5)
AST SERPL-CCNC: 124 U/L — HIGH (ref 10–40)
AST SERPL-CCNC: 129 U/L — HIGH (ref 10–40)
AST SERPL-CCNC: 133 U/L — HIGH (ref 10–40)
BACTERIA # UR AUTO: ABNORMAL
BASE EXCESS BLDA CALC-SCNC: -11.2 MMOL/L — LOW (ref -2–2)
BASE EXCESS BLDA CALC-SCNC: -11.3 MMOL/L — LOW (ref -2–2)
BILIRUB SERPL-MCNC: 2.1 MG/DL — HIGH (ref 0.2–1.2)
BILIRUB SERPL-MCNC: 2.5 MG/DL — HIGH (ref 0.2–1.2)
BILIRUB SERPL-MCNC: 3.5 MG/DL — HIGH (ref 0.2–1.2)
BILIRUB UR-MCNC: NEGATIVE — SIGNIFICANT CHANGE UP
BUN SERPL-MCNC: 39 MG/DL — HIGH (ref 7–23)
BUN SERPL-MCNC: 40 MG/DL — HIGH (ref 7–23)
BUN SERPL-MCNC: 40 MG/DL — HIGH (ref 7–23)
CALCIUM SERPL-MCNC: 5.4 MG/DL — CRITICAL LOW (ref 8.4–10.5)
CALCIUM SERPL-MCNC: 7.3 MG/DL — LOW (ref 8.4–10.5)
CALCIUM SERPL-MCNC: 7.4 MG/DL — LOW (ref 8.4–10.5)
CHLORIDE SERPL-SCNC: 106 MMOL/L — SIGNIFICANT CHANGE UP (ref 96–108)
CHLORIDE SERPL-SCNC: 108 MMOL/L — SIGNIFICANT CHANGE UP (ref 96–108)
CHLORIDE SERPL-SCNC: 110 MMOL/L — HIGH (ref 96–108)
CO2 SERPL-SCNC: 17 MMOL/L — LOW (ref 22–31)
CO2 SERPL-SCNC: 18 MMOL/L — LOW (ref 22–31)
CO2 SERPL-SCNC: 18 MMOL/L — LOW (ref 22–31)
COLOR SPEC: YELLOW — SIGNIFICANT CHANGE UP
COMMENT - URINE: SIGNIFICANT CHANGE UP
CREAT SERPL-MCNC: 2.52 MG/DL — HIGH (ref 0.5–1.3)
CREAT SERPL-MCNC: 2.61 MG/DL — HIGH (ref 0.5–1.3)
CREAT SERPL-MCNC: 2.81 MG/DL — HIGH (ref 0.5–1.3)
CULTURE RESULTS: SIGNIFICANT CHANGE UP
DIFF PNL FLD: ABNORMAL
EPI CELLS # UR: SIGNIFICANT CHANGE UP
GLUCOSE SERPL-MCNC: 100 MG/DL — HIGH (ref 70–99)
GLUCOSE SERPL-MCNC: 78 MG/DL — SIGNIFICANT CHANGE UP (ref 70–99)
GLUCOSE SERPL-MCNC: 97 MG/DL — SIGNIFICANT CHANGE UP (ref 70–99)
GLUCOSE UR QL: NEGATIVE MG/DL — SIGNIFICANT CHANGE UP
GRAM STN FLD: SIGNIFICANT CHANGE UP
GRAM STN FLD: SIGNIFICANT CHANGE UP
HCO3 BLDA-SCNC: 15 MMOL/L — LOW (ref 23–27)
HCO3 BLDA-SCNC: 16 MMOL/L — LOW (ref 23–27)
HCT VFR BLD CALC: 32.5 % — LOW (ref 39–50)
HCT VFR BLD CALC: 33 % — LOW (ref 39–50)
HCT VFR BLD CALC: 34.6 % — LOW (ref 39–50)
HGB BLD-MCNC: 10.7 G/DL — LOW (ref 13–17)
HGB BLD-MCNC: 10.8 G/DL — LOW (ref 13–17)
HGB BLD-MCNC: 11.3 G/DL — LOW (ref 13–17)
HOROWITZ INDEX BLDA+IHG-RTO: 32 — SIGNIFICANT CHANGE UP
HOROWITZ INDEX BLDA+IHG-RTO: 60 — SIGNIFICANT CHANGE UP
INR BLD: 1.36 RATIO — HIGH (ref 0.88–1.16)
IRON SATN MFR SERPL: 4 % — LOW (ref 16–55)
IRON SATN MFR SERPL: 7 UG/DL — LOW (ref 45–165)
KETONES UR-MCNC: NEGATIVE — SIGNIFICANT CHANGE UP
LACTATE SERPL-SCNC: 2.1 MMOL/L — HIGH (ref 0.7–2)
LACTATE SERPL-SCNC: 2.8 MMOL/L — HIGH (ref 0.7–2)
LACTATE SERPL-SCNC: 3.9 MMOL/L — HIGH (ref 0.7–2)
LDH SERPL L TO P-CCNC: 291 U/L — HIGH (ref 50–242)
LEUKOCYTE ESTERASE UR-ACNC: ABNORMAL
MAGNESIUM SERPL-MCNC: 1.6 MG/DL — SIGNIFICANT CHANGE UP (ref 1.6–2.6)
MCHC RBC-ENTMCNC: 29.7 PG — SIGNIFICANT CHANGE UP (ref 27–34)
MCHC RBC-ENTMCNC: 29.8 PG — SIGNIFICANT CHANGE UP (ref 27–34)
MCHC RBC-ENTMCNC: 29.8 PG — SIGNIFICANT CHANGE UP (ref 27–34)
MCHC RBC-ENTMCNC: 32.7 GM/DL — SIGNIFICANT CHANGE UP (ref 32–36)
MCHC RBC-ENTMCNC: 32.7 GM/DL — SIGNIFICANT CHANGE UP (ref 32–36)
MCHC RBC-ENTMCNC: 32.9 GM/DL — SIGNIFICANT CHANGE UP (ref 32–36)
MCV RBC AUTO: 90.5 FL — SIGNIFICANT CHANGE UP (ref 80–100)
MCV RBC AUTO: 91.1 FL — SIGNIFICANT CHANGE UP (ref 80–100)
MCV RBC AUTO: 91.2 FL — SIGNIFICANT CHANGE UP (ref 80–100)
METHOD TYPE: SIGNIFICANT CHANGE UP
NITRITE UR-MCNC: NEGATIVE — SIGNIFICANT CHANGE UP
NRBC # BLD: 0 /100 WBCS — SIGNIFICANT CHANGE UP (ref 0–0)
NT-PROBNP SERPL-SCNC: HIGH PG/ML (ref 0–450)
PCO2 BLDA: 32 MMHG — SIGNIFICANT CHANGE UP (ref 32–46)
PCO2 BLDA: 38 MMHG — SIGNIFICANT CHANGE UP (ref 32–46)
PH BLDA: 7.22 — LOW (ref 7.35–7.45)
PH BLDA: 7.27 — LOW (ref 7.35–7.45)
PH UR: 6 — SIGNIFICANT CHANGE UP (ref 5–8)
PHOSPHATE SERPL-MCNC: 4.2 MG/DL — SIGNIFICANT CHANGE UP (ref 2.5–4.5)
PLATELET # BLD AUTO: 65 K/UL — LOW (ref 150–400)
PLATELET # BLD AUTO: 65 K/UL — LOW (ref 150–400)
PLATELET # BLD AUTO: 66 K/UL — LOW (ref 150–400)
PO2 BLDA: 189 MMHG — HIGH (ref 74–108)
PO2 BLDA: 72 MMHG — LOW (ref 74–108)
POTASSIUM SERPL-MCNC: 5.1 MMOL/L — SIGNIFICANT CHANGE UP (ref 3.5–5.3)
POTASSIUM SERPL-MCNC: 5.2 MMOL/L — SIGNIFICANT CHANGE UP (ref 3.5–5.3)
POTASSIUM SERPL-MCNC: 5.7 MMOL/L — HIGH (ref 3.5–5.3)
POTASSIUM SERPL-SCNC: 5.1 MMOL/L — SIGNIFICANT CHANGE UP (ref 3.5–5.3)
POTASSIUM SERPL-SCNC: 5.2 MMOL/L — SIGNIFICANT CHANGE UP (ref 3.5–5.3)
POTASSIUM SERPL-SCNC: 5.7 MMOL/L — HIGH (ref 3.5–5.3)
PROT SERPL-MCNC: 5 G/DL — LOW (ref 6–8.3)
PROT SERPL-MCNC: 5.6 G/DL — LOW (ref 6–8.3)
PROT SERPL-MCNC: 5.6 G/DL — LOW (ref 6–8.3)
PROT UR-MCNC: 30 MG/DL
PROTHROM AB SERPL-ACNC: 16.2 SEC — HIGH (ref 10.6–13.6)
RBC # BLD: 3.59 M/UL — LOW (ref 4.2–5.8)
RBC # BLD: 3.62 M/UL — LOW (ref 4.2–5.8)
RBC # BLD: 3.8 M/UL — LOW (ref 4.2–5.8)
RBC # FLD: 13.2 % — SIGNIFICANT CHANGE UP (ref 10.3–14.5)
RBC # FLD: 13.4 % — SIGNIFICANT CHANGE UP (ref 10.3–14.5)
RBC # FLD: 13.5 % — SIGNIFICANT CHANGE UP (ref 10.3–14.5)
RBC CASTS # UR COMP ASSIST: ABNORMAL /HPF (ref 0–4)
SAO2 % BLDA: 92 % — SIGNIFICANT CHANGE UP (ref 92–96)
SAO2 % BLDA: 99 % — HIGH (ref 92–96)
SODIUM SERPL-SCNC: 136 MMOL/L — SIGNIFICANT CHANGE UP (ref 135–145)
SODIUM SERPL-SCNC: 138 MMOL/L — SIGNIFICANT CHANGE UP (ref 135–145)
SODIUM SERPL-SCNC: 141 MMOL/L — SIGNIFICANT CHANGE UP (ref 135–145)
SP GR SPEC: 1.01 — SIGNIFICANT CHANGE UP (ref 1.01–1.02)
SPECIMEN SOURCE: SIGNIFICANT CHANGE UP
TIBC SERPL-MCNC: 187 UG/DL — LOW (ref 220–430)
TSH SERPL-MCNC: 1.44 UIU/ML — SIGNIFICANT CHANGE UP (ref 0.27–4.2)
UIBC SERPL-MCNC: 180 UG/DL — SIGNIFICANT CHANGE UP (ref 110–370)
UROBILINOGEN FLD QL: NEGATIVE MG/DL — SIGNIFICANT CHANGE UP
WBC # BLD: 11.9 K/UL — HIGH (ref 3.8–10.5)
WBC # BLD: 14.05 K/UL — HIGH (ref 3.8–10.5)
WBC # BLD: 16.03 K/UL — HIGH (ref 3.8–10.5)
WBC # FLD AUTO: 11.9 K/UL — HIGH (ref 3.8–10.5)
WBC # FLD AUTO: 14.05 K/UL — HIGH (ref 3.8–10.5)
WBC # FLD AUTO: 16.03 K/UL — HIGH (ref 3.8–10.5)
WBC UR QL: SIGNIFICANT CHANGE UP

## 2021-05-08 PROCEDURE — 71045 X-RAY EXAM CHEST 1 VIEW: CPT | Mod: 26

## 2021-05-08 PROCEDURE — 74176 CT ABD & PELVIS W/O CONTRAST: CPT | Mod: 26

## 2021-05-08 PROCEDURE — 93010 ELECTROCARDIOGRAM REPORT: CPT

## 2021-05-08 PROCEDURE — 71250 CT THORAX DX C-: CPT | Mod: 26

## 2021-05-08 PROCEDURE — 71045 X-RAY EXAM CHEST 1 VIEW: CPT | Mod: 26,76

## 2021-05-08 PROCEDURE — 93306 TTE W/DOPPLER COMPLETE: CPT | Mod: 26

## 2021-05-08 PROCEDURE — 99291 CRITICAL CARE FIRST HOUR: CPT

## 2021-05-08 RX ORDER — MEROPENEM 1 G/30ML
1000 INJECTION INTRAVENOUS EVERY 8 HOURS
Refills: 0 | Status: DISCONTINUED | OUTPATIENT
Start: 2021-05-08 | End: 2021-05-09

## 2021-05-08 RX ORDER — HYDROCORTISONE 20 MG
100 TABLET ORAL EVERY 8 HOURS
Refills: 0 | Status: DISCONTINUED | OUTPATIENT
Start: 2021-05-08 | End: 2021-05-10

## 2021-05-08 RX ORDER — SODIUM CHLORIDE 9 MG/ML
250 INJECTION, SOLUTION INTRAVENOUS ONCE
Refills: 0 | Status: COMPLETED | OUTPATIENT
Start: 2021-05-08 | End: 2021-05-08

## 2021-05-08 RX ORDER — PROPOFOL 10 MG/ML
10 INJECTION, EMULSION INTRAVENOUS
Qty: 1000 | Refills: 0 | Status: DISCONTINUED | OUTPATIENT
Start: 2021-05-08 | End: 2021-05-10

## 2021-05-08 RX ORDER — CHLORHEXIDINE GLUCONATE 213 G/1000ML
15 SOLUTION TOPICAL EVERY 12 HOURS
Refills: 0 | Status: DISCONTINUED | OUTPATIENT
Start: 2021-05-08 | End: 2021-05-08

## 2021-05-08 RX ORDER — ACETAMINOPHEN 500 MG
650 TABLET ORAL ONCE
Refills: 0 | Status: COMPLETED | OUTPATIENT
Start: 2021-05-08 | End: 2021-05-08

## 2021-05-08 RX ORDER — ETOMIDATE 2 MG/ML
20 INJECTION INTRAVENOUS ONCE
Refills: 0 | Status: COMPLETED | OUTPATIENT
Start: 2021-05-08 | End: 2021-05-08

## 2021-05-08 RX ORDER — SODIUM CHLORIDE 9 MG/ML
1000 INJECTION, SOLUTION INTRAVENOUS ONCE
Refills: 0 | Status: COMPLETED | OUTPATIENT
Start: 2021-05-08 | End: 2021-05-08

## 2021-05-08 RX ORDER — DEXMEDETOMIDINE HYDROCHLORIDE IN 0.9% SODIUM CHLORIDE 4 UG/ML
0.3 INJECTION INTRAVENOUS
Qty: 200 | Refills: 0 | Status: DISCONTINUED | OUTPATIENT
Start: 2021-05-08 | End: 2021-05-08

## 2021-05-08 RX ORDER — VANCOMYCIN HCL 1 G
750 VIAL (EA) INTRAVENOUS ONCE
Refills: 0 | Status: COMPLETED | OUTPATIENT
Start: 2021-05-08 | End: 2021-05-08

## 2021-05-08 RX ORDER — NOREPINEPHRINE BITARTRATE/D5W 8 MG/250ML
0.03 PLASTIC BAG, INJECTION (ML) INTRAVENOUS
Qty: 8 | Refills: 0 | Status: DISCONTINUED | OUTPATIENT
Start: 2021-05-08 | End: 2021-05-08

## 2021-05-08 RX ORDER — CHLORHEXIDINE GLUCONATE 213 G/1000ML
15 SOLUTION TOPICAL
Refills: 0 | Status: DISCONTINUED | OUTPATIENT
Start: 2021-05-08 | End: 2021-05-14

## 2021-05-08 RX ORDER — SODIUM BICARBONATE 1 MEQ/ML
0.07 SYRINGE (ML) INTRAVENOUS
Qty: 50 | Refills: 0 | Status: COMPLETED | OUTPATIENT
Start: 2021-05-08 | End: 2021-05-08

## 2021-05-08 RX ORDER — MIDODRINE HYDROCHLORIDE 2.5 MG/1
10 TABLET ORAL EVERY 8 HOURS
Refills: 0 | Status: DISCONTINUED | OUTPATIENT
Start: 2021-05-08 | End: 2021-05-11

## 2021-05-08 RX ORDER — MIDAZOLAM HYDROCHLORIDE 1 MG/ML
2 INJECTION, SOLUTION INTRAMUSCULAR; INTRAVENOUS
Refills: 0 | Status: DISCONTINUED | OUTPATIENT
Start: 2021-05-08 | End: 2021-05-09

## 2021-05-08 RX ORDER — NOREPINEPHRINE BITARTRATE/D5W 8 MG/250ML
0.05 PLASTIC BAG, INJECTION (ML) INTRAVENOUS
Qty: 16 | Refills: 0 | Status: DISCONTINUED | OUTPATIENT
Start: 2021-05-08 | End: 2021-05-10

## 2021-05-08 RX ORDER — MIDAZOLAM HYDROCHLORIDE 1 MG/ML
2 INJECTION, SOLUTION INTRAMUSCULAR; INTRAVENOUS ONCE
Refills: 0 | Status: DISCONTINUED | OUTPATIENT
Start: 2021-05-08 | End: 2021-05-08

## 2021-05-08 RX ORDER — CHLORHEXIDINE GLUCONATE 213 G/1000ML
1 SOLUTION TOPICAL DAILY
Refills: 0 | Status: DISCONTINUED | OUTPATIENT
Start: 2021-05-08 | End: 2021-05-14

## 2021-05-08 RX ADMIN — Medication 650 MILLIGRAM(S): at 02:51

## 2021-05-08 RX ADMIN — Medication 100 MILLIGRAM(S): at 14:45

## 2021-05-08 RX ADMIN — SODIUM CHLORIDE 1000 MILLILITER(S): 9 INJECTION, SOLUTION INTRAVENOUS at 07:10

## 2021-05-08 RX ADMIN — MEROPENEM 100 MILLIGRAM(S): 1 INJECTION INTRAVENOUS at 14:45

## 2021-05-08 RX ADMIN — MIDAZOLAM HYDROCHLORIDE 2 MILLIGRAM(S): 1 INJECTION, SOLUTION INTRAMUSCULAR; INTRAVENOUS at 13:30

## 2021-05-08 RX ADMIN — DEXMEDETOMIDINE HYDROCHLORIDE IN 0.9% SODIUM CHLORIDE 5.7 MICROGRAM(S)/KG/HR: 4 INJECTION INTRAVENOUS at 07:14

## 2021-05-08 RX ADMIN — Medication 100 MILLIGRAM(S): at 22:12

## 2021-05-08 RX ADMIN — MEROPENEM 100 MILLIGRAM(S): 1 INJECTION INTRAVENOUS at 22:12

## 2021-05-08 RX ADMIN — Medication 650 MILLIGRAM(S): at 03:51

## 2021-05-08 RX ADMIN — DORZOLAMIDE HYDROCHLORIDE TIMOLOL MALEATE 1 DROP(S): 20; 5 SOLUTION/ DROPS OPHTHALMIC at 05:06

## 2021-05-08 RX ADMIN — Medication 4.28 MICROGRAM(S)/KG/MIN: at 03:51

## 2021-05-08 RX ADMIN — MIDODRINE HYDROCHLORIDE 10 MILLIGRAM(S): 2.5 TABLET ORAL at 20:28

## 2021-05-08 RX ADMIN — DORZOLAMIDE HYDROCHLORIDE TIMOLOL MALEATE 1 DROP(S): 20; 5 SOLUTION/ DROPS OPHTHALMIC at 18:44

## 2021-05-08 RX ADMIN — Medication 250 MILLIGRAM(S): at 10:21

## 2021-05-08 RX ADMIN — Medication 650 MILLIGRAM(S): at 06:15

## 2021-05-08 RX ADMIN — PROPOFOL 4.56 MICROGRAM(S)/KG/MIN: 10 INJECTION, EMULSION INTRAVENOUS at 13:30

## 2021-05-08 RX ADMIN — Medication 3.56 MICROGRAM(S)/KG/MIN: at 21:02

## 2021-05-08 RX ADMIN — MIDODRINE HYDROCHLORIDE 10 MILLIGRAM(S): 2.5 TABLET ORAL at 05:06

## 2021-05-08 RX ADMIN — ETOMIDATE 20 MILLIGRAM(S): 2 INJECTION INTRAVENOUS at 13:00

## 2021-05-08 RX ADMIN — LATANOPROST 1 DROP(S): 0.05 SOLUTION/ DROPS OPHTHALMIC; TOPICAL at 22:13

## 2021-05-08 RX ADMIN — SODIUM CHLORIDE 500 MILLILITER(S): 9 INJECTION, SOLUTION INTRAVENOUS at 06:17

## 2021-05-08 RX ADMIN — PROPOFOL 4.56 MICROGRAM(S)/KG/MIN: 10 INJECTION, EMULSION INTRAVENOUS at 22:44

## 2021-05-08 RX ADMIN — Medication 3.56 MICROGRAM(S)/KG/MIN: at 10:21

## 2021-05-08 RX ADMIN — Medication 100 MEQ/KG/HR: at 10:22

## 2021-05-08 RX ADMIN — PIPERACILLIN AND TAZOBACTAM 25 GRAM(S): 4; .5 INJECTION, POWDER, LYOPHILIZED, FOR SOLUTION INTRAVENOUS at 05:05

## 2021-05-08 NOTE — PROGRESS NOTE ADULT - PROBLEM SELECTOR PLAN 2
-30 cc/kg fluid challenge without improvement in blood pressure  -patient currently on Levophed, will titrate for MAP 65-70  -repeat lactate  -blood/urine/sputum cultures, then initiate broad spectrum antibiotics  -follow cultures and narrow antibitoics as able  -goal UOP >0.5 cc/kg/hr  - will increase the midodrine to 15 as well

## 2021-05-08 NOTE — PROCEDURE NOTE - NSINDICATIONS_GEN_A_CORE
airway protection/critical patient/mental status change/respiratory distress/respiratory failure
feeding tube replacement
critical illness/emergency venous access/venous access

## 2021-05-08 NOTE — PROGRESS NOTE ADULT - ASSESSMENT
81M with HTN, BPH who presents with hematuria and weakness.  am CXR noted - labs reviewed - on BIPAP and Pressors - left a message for family to discuss intubation and condition -   Sepsis - Shock - eval in progress - serial Lactic acid - s/p IVF - I and O - monitor VS and HD and Sat  CKD - HORACIO - s/p IVF - Pressors - Renal Eval  keep MAP > 60, Midodrine 10 mg PO TID - Pressors IV as needed   Serial labs - replete lytes - monitor biomarkers  ID eval noted - pt is on broad ABX  - cx in progress - adjusted to Meropenem - ID follow up -   I jeremy - mucus impaction - mucus plugging on imaging - Albuterol PRN - Chest PT -   BPH rx regimen  US abd - CT renal stone hunt - CXR - noted  [facility] monitoring - care and supportive measures

## 2021-05-08 NOTE — PHARMACY COMMUNICATION NOTE - COMMENTS
spoke to md he will be putting a central line in .will finish 8 mg bag and then start 16 on central line tel call

## 2021-05-08 NOTE — PROGRESS NOTE ADULT - PROBLEM SELECTOR PLAN 4
Tried calming techniques overnight with interpreters assistance without success   Precedex started for patient safety due to climbing out of bed  watch for bradycardia and increasing hypotension    altered mental staus/agitation  is likely delirium secondary to sepsis

## 2021-05-08 NOTE — PROGRESS NOTE ADULT - SUBJECTIVE AND OBJECTIVE BOX
81y  Male  No Known Allergies    CC; Patient is a 81y old  Male who presents with a chief complaint of hematuria, nausea, weakness     HPI:  81 year old male PMHx of HTN, BPH with cystoscopy about 2 weeks ago to evaluate chronic bacteriuria but since then has had hematuria on and off for 2 weeks . Yesterday he presented with hematuria weakness with fever chills and nausea no vomiting On presentation to ER he had a temp of 101.2F. WBC 0.78, lactate 2.3, and UA positive for UTI  with +nitrite, occasional bacteria and CT abdomin that showed cystitis, with multiple bladder diverticula.    Overnight tonight his blood pressure slowly declined to remain in the mid 50 requiring low dose levophed to maintain map >65. Lactate has risen as well fluid bolus x2 and increased agitation overnight to the point of near climbing out of the bed, started on precedex. He spiked temp of 103  overnight was given Tylenol twice  and cooling measures  remains on Zosyn no need to adjust antibiotics, ID will follow       PAST MEDICAL & SURGICAL HISTORY:  Hypertension  Hyperlipidemia  BPH (benign prostatic hyperplasia)  History of prostate surgery    Vital Signs Last 24 Hrs  T(C): 39.7 (08 May 2021 05:45), Max: 39.7 (08 May 2021 05:45)  T(F): 103.5 (08 May 2021 05:45), Max: 103.5 (08 May 2021 05:45)  HR: 112 (08 May 2021 06:31) (73 - 113)  BP: 99/69 (08 May 2021 06:31) (65/48 - 142/69)  BP(mean): 80 (08 May 2021 06:31) (51 - 86)  RR: 39 (08 May 2021 06:31) (17 - 39)  SpO2: 94% (08 May 2021 06:31) (93% - 99%)    I&O's Summary  06 May 2021 07:01  -  07 May 2021 07:00  --------------------------------------------------------  IN: 790 mL / OUT: 75 mL / NET: 715 mL    07 May 2021 07:01  -  08 May 2021 06:52  --------------------------------------------------------  IN: 4814.5 mL / OUT: 1000 mL / NET: 3814.5 mL      LABS      136  |  107  |  31<H>  ----------------------------<  89  3.8   |  15<L>  |  2.31<H>  Ca    7.3<L>      07 May 2021 16:24  Phos  3.1       Mg     1.7       TPro  5.3<L>  /  Alb  2.6<L>  /  TBili  1.5<H>  /  DBili  x   /  AST  226<H>  /  ALT  142<H>  /  AlkPhos  83                          10.6   3.85  )-----------( 111      ( 07 May 2021 07:26 )             32.3     PT/INR - ( 07 May 2021 07:26 )   PT: 16.2 sec;   INR: 1.36 ratio     PTT - ( 07 May 2021 07:26 )  PTT:37.5 sec    LIVER FUNCTIONS - ( 07 May 2021 07:26 )  Alb: 2.6 g/dL / Pro: 5.3 g/dL / ALK PHOS: 83 U/L / ALT: 142 U/L DA / AST: 226 U/L / GGT: x             Urinalysis Basic - ( 06 May 2021 22:26 )  Color: Sabiha / Appearance: Turbid / S.015 / pH: x  Gluc: x / Ketone: Negative  / Bili: Negative / Urobili: Negative mg/dL   Blood: x / Protein: 100 mg/dL / Nitrite: Positive   Leuk Esterase: Moderate / RBC: >50 /HPF / WBC 0-2   Sq Epi: x / Non Sq Epi: Occasional / Bacteria: Occasional      MEDICATIONS  (STANDING):  atorvastatin 20 milliGRAM(s) Oral at bedtime  dexMEDEtomidine Infusion 0.3 MICROgram(s)/kG/Hr (5.7 mL/Hr) IV Continuous <Continuous>  dorzolamide 2%/timolol 0.5% Ophthalmic Solution 1 Drop(s) Both EYES two times a day  lactated ringers Bolus 1000 milliLiter(s) IV Bolus once  lactated ringers. 1000 milliLiter(s) (100 mL/Hr) IV Continuous <Continuous>  latanoprost 0.005% Ophthalmic Solution 1 Drop(s) Both EYES at bedtime  midodrine 10 milliGRAM(s) Oral every 8 hours  norepinephrine Infusion 0.03 MICROgram(s)/kG/Min (4.28 mL/Hr) IV Continuous <Continuous>  piperacillin/tazobactam IVPB.. 3.375 Gram(s) IV Intermittent every 8 hours  tamsulosin 0.4 milliGRAM(s) Oral at bedtime      REVIEW OF SYSTEMS:    CONSTITUTIONAL: No fever, weight loss, or fatigue  EYES: No eye pain, visual disturbances, or discharge  ENMT:  No difficulty hearing, tinnitus, vertigo; No sinus or throat pain  NECK: No pain or stiffness  BREASTS: No pain, masses, or nipple discharge  RESPIRATORY: No cough, wheezing, chills or hemoptysis; No shortness of breath  CARDIOVASCULAR: No chest pain, palpitations, dizziness, or leg swelling  GASTROINTESTINAL: No abdominal or epigastric pain. No nausea, vomiting, or hematemesis; No diarrhea or constipation. No melena or hematochezia.  GENITOURINARY: No dysuria, frequency, hematuria, or incontinence  NEUROLOGICAL: No headaches, memory loss, loss of strength, numbness, or tremors  SKIN: No itching, burning, rashes, or lesions   LYMPH NODES: No enlarged glands  ENDOCRINE: No heat or cold intolerance; No hair loss  MUSCULOSKELETAL: No joint pain or swelling; No muscle, back, or extremity pain  PSYCHIATRIC: No depression, anxiety, mood swings, or difficulty sleeping  HEME/LYMPH: No easy bruising, or bleeding gums  ALLERY AND IMMUNOLOGIC: No hives or eczema      Physicial Exam:     Constitutional: NAD, well-groomed, well-developed  HEENT: PERRLA, EOMI, no drainage or redness  Neck: No bruits; no thyromegaly or nodules,  No JVD  Back: Normal spine flexure, No CVA tenderness, No deformity or limitation of movement  Respiratory: Breath Sounds equal & clear to percussion & auscultation, no accessory muscle use  Cardiovascular: Regular rate & rhythm, normal S1, S2; no murmurs, gallops or rubs; no S3, S4  Gastrointestinal: Soft, non-tender, non distended no hepatosplenomegaly, normal bowel sounds  Extremities: No peripheral edema, No cyanosis, clubbing   Vascular: Equal and normal pulses: 2+ peripheral pulses throughout  Neurological: GCS:    A&O x 3; no sensory, motor  deficits, normal reflexes  Psychiatric: Normal mood, normal affect  Musculoskeletal: No joint pain, swelling or deformity; no limitation of movement  Skin: No rashes

## 2021-05-08 NOTE — PROGRESS NOTE ADULT - SUBJECTIVE AND OBJECTIVE BOX
Subjective: Patient seen and examined.  Overnight transferred to ICU for Septic Shock. Became delerius and started on Precedex and Levo.     MEDICATIONS  (STANDING):  atorvastatin 20 milliGRAM(s) Oral at bedtime  dexMEDEtomidine Infusion 0.3 MICROgram(s)/kG/Hr (5.7 mL/Hr) IV Continuous <Continuous>  dorzolamide 2%/timolol 0.5% Ophthalmic Solution 1 Drop(s) Both EYES two times a day  lactated ringers. 1000 milliLiter(s) (100 mL/Hr) IV Continuous <Continuous>  latanoprost 0.005% Ophthalmic Solution 1 Drop(s) Both EYES at bedtime  midodrine 10 milliGRAM(s) Oral every 8 hours  norepinephrine Infusion 0.03 MICROgram(s)/kG/Min (4.28 mL/Hr) IV Continuous <Continuous>  piperacillin/tazobactam IVPB.. 3.375 Gram(s) IV Intermittent every 8 hours  tamsulosin 0.4 milliGRAM(s) Oral at bedtime    MEDICATIONS  (PRN):  acetaminophen   Tablet .. 650 milliGRAM(s) Oral every 6 hours PRN Temp greater or equal to 38C (100.4F), Mild Pain (1 - 3)  morphine  - Injectable 2 milliGRAM(s) IV Push every 4 hours PRN Breakthrough Pain  oxyCODONE    IR 5 milliGRAM(s) Oral every 4 hours PRN Moderate Pain (4 - 6)  oxyCODONE    IR 10 milliGRAM(s) Oral every 4 hours PRN Severe Pain (7 - 10)      Allergies    No Known Allergies    Intolerances        Vital Signs Last 24 Hrs  T(C): 38.6 (08 May 2021 09:03), Max: 39.7 (08 May 2021 05:45)  T(F): 101.4 (08 May 2021 09:03), Max: 103.5 (08 May 2021 05:45)  HR: 106 (08 May 2021 09:00) (73 - 116)  BP: 81/49 (08 May 2021 09:00) (63/51 - 110/76)  BP(mean): 58 (08 May 2021 09:00) (51 - 86)  RR: 29 (08 May 2021 09:00) (17 - 39)  SpO2: 95% (08 May 2021 09:00) (87% - 99%)    PHYSICAL EXAM:  GENERAL: NAD, well-groomed, well-developed  HEAD:  Atraumatic, Normocephalic  ENMT: Moist mucous membranes,   NECK: Supple, No JVD, Normal thyroid  NERVOUS SYSTEM:  All 4 extremities mobile, no gross sensory deficits.   CHEST/LUNG: Clear to auscultation bilaterally; No rales, rhonchi, wheezing, or rubs  HEART: Regular rate and rhythm; No murmurs, rubs, or gallops  ABDOMEN: Soft, Nontender, Nondistended; Bowel sounds present  EXTREMITIES:  2+ Peripheral Pulses, No clubbing, cyanosis, or edema      LABS:                        10.8   11.90 )-----------( 65       ( 08 May 2021 06:40 )             33.0     08 May 2021 06:40    136    |  106    |  39     ----------------------------<  78     5.1     |  17     |  2.81     Ca    7.4        08 May 2021 06:40  Phos  4.2       08 May 2021 06:40  Mg     1.6       08 May 2021 06:40    TPro  5.6    /  Alb  2.5    /  TBili  2.1    /  DBili  x      /  AST  133    /  ALT  118    /  AlkPhos  45     08 May 2021 06:40    PT/INR - ( 07 May 2021 07:26 )   PT: 16.2 sec;   INR: 1.36 ratio         PTT - ( 07 May 2021 07:26 )  PTT:37.5 sec  Urinalysis Basic - ( 06 May 2021 22:26 )    Color: Sabiha / Appearance: Turbid / S.015 / pH: x  Gluc: x / Ketone: Negative  / Bili: Negative / Urobili: Negative mg/dL   Blood: x / Protein: 100 mg/dL / Nitrite: Positive   Leuk Esterase: Moderate / RBC: >50 /HPF / WBC 0-2   Sq Epi: x / Non Sq Epi: Occasional / Bacteria: Occasional      CAPILLARY BLOOD GLUCOSE          RADIOLOGY & ADDITIONAL TESTS:    Imaging Personally Reviewed:  [ ] YES     Consultant(s) Notes Reviewed:      Care Discussed with Consultants/Other Providers:    Advanced Directives: [ ] DNR  [ ] No feeding tube  [ ] MOLST in chart  [ ] MOLST completed today  [ ] Unknown

## 2021-05-08 NOTE — PROVIDER CONTACT NOTE (EICU) - RECOMMENDATIONS
-CXR to assess for fluid overload; can do ultrasound as well to assess for volume overload  -BPAP for WOB  -Low threshold for intubation  -Stop fluids  -Add diff to CBS as patient had a developing bandemia on 5/7; cultures are still pending

## 2021-05-08 NOTE — CHART NOTE - NSCHARTNOTEFT_GEN_A_CORE
spoke with niece -   plan to Intubate - central access - CT chest and abdomen  discussed plan of care with nursing - medical - family  prognosis guarded  pt is full code

## 2021-05-08 NOTE — CONSULT NOTE ADULT - SUBJECTIVE AND OBJECTIVE BOX
NEPHROLOGY CONSULTATION    CHIEF COMPLAINT:  HORACIO      HPI:  Admitted yesterday with fever, altered mental status and several days of hematuria following a cystoscope 2 weeks ago (done to evaluate chronic bacteriuria).  He became acutely unstable overnight and is now intubated on levophed.  Blood work reflects acutely worsening renal function.  No arrington is in.      ROS:  unable, sedated      PAST MEDICAL & SURGICAL HISTORY:  Hypertension  Hyperlipidemia  BPH (benign prostatic hyperplasia)    History of prostate surgery        SOCIAL HISTORY:  NA    FAMILY HISTORY:  NA      MEDICATIONS  (STANDING):  chlorhexidine 0.12% Liquid 15 milliLiter(s) Oral Mucosa every 12 hours  dorzolamide 2%/timolol 0.5% Ophthalmic Solution 1 Drop(s) Both EYES two times a day  hydrocortisone sodium succinate Injectable 100 milliGRAM(s) IV Push every 8 hours  latanoprost 0.005% Ophthalmic Solution 1 Drop(s) Both EYES at bedtime  meropenem  IVPB 1000 milliGRAM(s) IV Intermittent every 8 hours  midodrine 10 milliGRAM(s) Oral every 8 hours  norepinephrine Infusion 0.05 MICROgram(s)/kG/Min (3.56 mL/Hr) IV Continuous <Continuous>  propofol Infusion 10 MICROgram(s)/kG/Min (4.56 mL/Hr) IV Continuous <Continuous>      PHYSICAL EXAMINATION:  T(F): 101.4 (21 @ 09:03)  HR: 85 (21 @ 13:16)  BP: 111/64 (21 @ 11:30)  RR: 24 (21 @ 11:30)  SpO2: 99% (21 @ 13:16)  Conversant, no apparent distress  PERRLA, pink conjunctivae, no ptosis  Good dentition, no pharyngeal erythema  Neck non tender, no mass, no thyromegaly or nodules  Normal respiratory effort, lungs clear to auscultation  Heart with RRR, no murmurs or rubs, no peripheral edema  Abdomen soft, nondistended, no palpable bladder  Skin no rashes, ulcers or lesions, normal turgor and temperature      LABS:                        10.8   11.90 )-----------( 65       ( 08 May 2021 06:40 )             33.0         136  |  106  |  39<H>  ----------------------------<  78  5.1   |  17<L>  |  2.81<H>    Ca    7.4<L>      08 May 2021 06:40  Phos  4.2     05-08  Mg     1.6     -08    TPro  5.6<L>  /  Alb  2.5<L>  /  TBili  2.1<H>  /  DBili  x   /  AST  133<H>  /  ALT  118<H>  /  AlkPhos  45  05-    ABG 7.22-38-72-15    Lactate 4.5 -> 3.9    proBNP  > 35K      Urinalysis Basic - ( 06 May 2021 22:26 )  Color: Sabiha / Appearance: Turbid / S.015 / pH: x  Gluc: x / Ketone: Negative  / Bili: Negative / Urobili: Negative mg/dL   Blood: x / Protein: 100 mg/dL / Nitrite: Positive   Leuk Esterase: Moderate / RBC: >50 /HPF / WBC 0-2   Sq Epi: x / Non Sq Epi: Occasional / Bacteria: Occasional      RADIOLOGY:  Non contrast CT was personally reviewed fullness of left renal pelvis, thick walled bladder with several diverticula with layering calcifications    CXR shows mild perihilar and basilar air space disease      ASSESSMENT:  1.  HORACIO in setting of septic shock with likely  focus  2.  Lactic acidosis due to septic shock    PLAN:  Continue supportive care with antibiotics, pressors, hemodynamic monitoring  Will defer volume management to critical care, but will need to be cautious as patient may be oligoanuric and has very elevated BNP  Consider bicarbonate infusion if pH drops any further  Agree with repeat imaging to assess for occult source in the abdomen  Microbiology data is still pending  Monitor BMP every 8 hours for next 24 hours;  high risk to go on to dialytic requirement

## 2021-05-08 NOTE — PROGRESS NOTE ADULT - ASSESSMENT
81 year old male admitted with Urosepsis now septic shock HORACIO and a mixed anxiety/ICU delirious picture.     Critical Care time: 40  mins assessing presenting problems of acute illness that poses high probability of life threatening deterioration or end organ damage/dysfunction.  Medical decision making including Initiating plan of care, reviewing data, reviewing radiology, direct patient bedside evaluation and interpretation of vital signs, any necessary ventilator management , discusion with multidisciplinary team, discussing goals of care with patient/family, all non inclusive of procedures, COVID 19 specific considerations and therapeutic  options based on the available and rapidly changing literature

## 2021-05-08 NOTE — PROGRESS NOTE ADULT - ASSESSMENT
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     Septic Shock / Urosepsis   Sepsis worsening with septic shock; Switch to IV Meropenam. Give one dose IV Vancomcyin. D/C Zosyn  Metabolic Acidosis worsening; Continue IVF and repeat Lactate   BIPAP for temporizing measure but will need to be intubated   Follow up Cultures / ID on board   If change of Abx do not improve symptoms then will reimage  Needs White inserted for I/O but unable to insert; Has Bladder Diverticula; Consult Urology   CC: Dr. Zayas aware     Hypoxic Respiratory Failure   Due to Sepsis and Volume Overload; BNP Elevated   Echo pending; Repeat CXR   On Abx     Acute Renal Failure   Urine output not being measure but making Urine   Metabolic Acidosis   Renally dose meds and monitor BMP and electrolytes     Metabolic Encephalopathy   Due to Urosepsis   Precedex     HTN  Hold anti-HTN meds    BPH  Hold Flomax    Diet  Regular    DVT Prophylaxis  Heparin    Disposition  Full Code/Inpatient  Discharge planning pending hospital course        81M HTN, BPH and Chronic Bacteriuria who had cystoscopy last week admitted for Septic Shock from  infection.     Septic Shock / Urosepsis   Sepsis worsening with septic shock; Switch to IV Meropenam. Give one dose IV Vancomcyin. D/C Zosyn  Metabolic Acidosis worsening; Continue IVF and repeat Lactate   BIPAP for temporizing measure but will need to be intubated   Follow up Cultures / ID on board   If change of Abx do not improve symptoms then will reimage  Needs White inserted for I/O but unable to insert; Has Bladder Diverticula; Consult Urology   CC: Dr. Zayas aware     Hypoxic Respiratory Failure   Due to Sepsis and Volume Overload; BNP Elevated   Echo pending; Repeat CXR   On Abx     Acute Renal Failure   Urine output not being measure but making Urine   Metabolic Acidosis   Renally dose meds and monitor BMP and electrolytes     Metabolic Encephalopathy   Due to Urosepsis   Precedex     HTN  Hold anti-HTN meds    BPH  Hold Flomax    Diet  Regular    DVT Prophylaxis  Heparin    Disposition  Full Code/Inpatient  Discharge planning pending hospital course   CC Time spent >60 minutes        81M HTN, BPH and Chronic Bacteriuria who had cystoscopy last week admitted for Septic Shock from  infection.     Septic Shock / Urosepsis   Sepsis worsening with septic shock; Switch to IV Meropenam. Give one dose IV Vancomcyin. D/C Zosyn  Metabolic Acidosis worsening; Continue IVF and repeat Lactate   BIPAP for temporizing measure but will need to be intubated   Follow up Cultures / ID on board   If change of Abx do not improve symptoms then will reimage  Needs White inserted for I/O but unable to insert; Has Bladder Diverticula; Consult Urology   CC: Dr. Zayas aware     Hypoxic Respiratory Failure   Due to Sepsis and Volume Overload; BNP Elevated   Echo pending; Repeat CXR   On Abx     Acute Renal Failure   Urine output not being measure but making Urine   Metabolic Acidosis   Renally dose meds and monitor BMP and electrolytes     Metabolic Encephalopathy   Due to Urosepsis   Precedex     HTN  Hold anti-HTN meds    BPH  Hold Flomax    Diet  Regular    DVT Prophylaxis  SCD    Disposition  Full Code/Inpatient  Discharge planning pending hospital course   CC Time spent >60 minutes        81M HTN, BPH and Chronic Bacteriuria who had cystoscopy last week admitted for Septic Shock from  infection.     Septic Shock / Urosepsis   Sepsis worsening with septic shock; Switch to IV Meropenam. Give one dose IV Vancomcyin. D/C Zosyn  Metabolic Acidosis worsening; Continue IVF and repeat Lactate   BIPAP for temporizing measure but will need to be intubated   Follow up Cultures / ID on board   If change of Abx do not improve symptoms then will reimage  Needs White inserted for I/O but unable to insert; Has Bladder Diverticula; Consult Urology   CC: Dr. Zayas aware     Hypoxic Respiratory Failure   Due to Sepsis and Volume Overload; BNP Elevated   Echo pending; Repeat CXR   On Abx     Acute Renal Failure   Urine output not being measure but making Urine   Metabolic Acidosis   Renally dose meds and monitor BMP and electrolytes     Metabolic Encephalopathy   Due to Urosepsis   Precedex     Thrombocytopenia  In the setting of Sepsis  Avoid LMWH; Consult Hematology     HTN  Hold anti-HTN meds    BPH  Hold Flomax    Diet  Regular    DVT Prophylaxis  SCD    Disposition  Full Code/Inpatient  Discharge planning pending hospital course   CC Time spent >60 minutes

## 2021-05-08 NOTE — PROVIDER CONTACT NOTE (EICU) - BACKGROUND
82yo admitted on 5/7/2021 with hematuria, encephalopathy with progressive septic shock. At time of eICU contact the patient was noted to be more tachypneic, encephalopathic and with labored breathing. Increasing pressor requirement.

## 2021-05-08 NOTE — PROGRESS NOTE ADULT - SUBJECTIVE AND OBJECTIVE BOX
ANN MARIE NAVARRETE is a 81yMale , patient examined and chart reviewed.     INTERVAL HPI/ OVERNIGHT EVENTS:  Events noted. Developed shock and respiratory failure.  Intubated and started on pressors.  Febrile Tmax 103.5    Past Medical History--  PAST MEDICAL & SURGICAL HISTORY:  Hypertension  Hyperlipidemia  BPH (benign prostatic hyperplasia)  History of prostate surgery        For details regarding the patient's social history, family history, and other miscellaneous elements, please refer the initial infectious diseases consultation and/or the admitting history and physical examination for this admission.      ROS:  Unable to obtain due to : pt's condition      Current inpatient medications :    ANTIBIOTICS/RELEVANT:  meropenem  IVPB 1000 milliGRAM(s) IV Intermittent every 8 hours      acetaminophen   Tablet .. 650 milliGRAM(s) Oral every 6 hours PRN  chlorhexidine 0.12% Liquid 15 milliLiter(s) Oral Mucosa every 12 hours  dorzolamide 2%/timolol 0.5% Ophthalmic Solution 1 Drop(s) Both EYES two times a day  hydrocortisone sodium succinate Injectable 100 milliGRAM(s) IV Push every 8 hours  latanoprost 0.005% Ophthalmic Solution 1 Drop(s) Both EYES at bedtime  midazolam Injectable 2 milliGRAM(s) IV Push every 30 minutes PRN  midodrine 10 milliGRAM(s) Oral every 8 hours  norepinephrine Infusion 0.05 MICROgram(s)/kG/Min IV Continuous <Continuous>  propofol Infusion 10 MICROgram(s)/kG/Min IV Continuous <Continuous>      Objective:     @ 07:  -   @ 07:00  --------------------------------------------------------  IN: 4814.5 mL / OUT: 1000 mL / NET: 3814.5 mL     @ 07:01  -   @ 14:28  --------------------------------------------------------  IN: 300 mL / OUT: 0 mL / NET: 300 mL      T(C): 38.6 (21 @ 09:03), Max: 39.7 (21 @ 05:45)  HR: 86 (21 @ 14:30) (85 - 116)  BP: 85/53 (21 @ 14:00) (63/51 - 118/73)  RR: 12 (21 @ 14:30) (0 - 39)  SpO2: 99% (21 @ 14:30) (81% - 100%)  Mode: AC/ CMV (Assist Control/ Continuous Mandatory Ventilation), RR (machine): 16, TV (machine): 450, FiO2: 60, PEEP: 5, ITime: 1, MAP: 11, PIP: 24    Physical Exam:  GEN: Vented sedated  HEENT: normocephalic and atraumatic.   NECK: Supple.   LUNGS: Decreased to auscultation.  HEART: Regular rate and rhythm without murmur.  ABDOMEN: Soft, nontender, and nondistended.  Positive bowel sounds.    EXTREMITIES: +edema.  NEUROLOGIC: Sedated      LABS:                        10.7   14.05 )-----------( 66       ( 08 May 2021 14:06 )             32.5       05-08    138  |  108  |  40<H>  ----------------------------<  97  5.7<H>   |  18<L>  |  2.61<H>    Ca    7.4<L>      08 May 2021 06:40  Phos  4.2     05-08  Mg     1.6     05-08    TPro  5.0<L>  /  Alb  2.2<L>  /  TBili  2.5<H>  /  DBili  x   /  AST  124<H>  /  ALT  102<H>  /  AlkPhos  40  05-08      PT/INR - ( 07 May 2021 07:26 )   PT: 16.2 sec;   INR: 1.36 ratio         PTT - ( 07 May 2021 07:26 )  PTT:37.5 sec  Urinalysis Basic - ( 06 May 2021 22:26 )    Color: Sabiha / Appearance: Turbid / S.015 / pH: x  Gluc: x / Ketone: Negative  / Bili: Negative / Urobili: Negative mg/dL   Blood: x / Protein: 100 mg/dL / Nitrite: Positive   Leuk Esterase: Moderate / RBC: >50 /HPF / WBC 0-2   Sq Epi: x / Non Sq Epi: Occasional / Bacteria: Occasional      ABG - ( 08 May 2021 09:09 )  pH, Arterial: 7.22  pH, Blood: x     /  pCO2: 38    /  pO2: 72    / HCO3: 15    / Base Excess: -11.2 /  SaO2: 92         Urinalysis Basic - ( 06 May 2021 22:26 )    Color: Sabiha / Appearance: Turbid / S.015 / pH: x  Gluc: x / Ketone: Negative  / Bili: Negative / Urobili: Negative mg/dL   Blood: x / Protein: 100 mg/dL / Nitrite: Positive   Leuk Esterase: Moderate / RBC: >50 /HPF / WBC 0-2   Sq Epi: x / Non Sq Epi: Occasional / Bacteria: Occasional    MICROBIOLOGY:    Culture - Blood (collected 07 May 2021 13:22)  Source: .Blood Blood-Peripheral  Preliminary Report (08 May 2021 14:01):    No growth to date.    Culture - Blood (collected 07 May 2021 13:22)  Source: .Blood Blood-Peripheral  Preliminary Report (08 May 2021 14:01):    No growth to date.    RADIOLOGY & ADDITIONAL STUDIES:    EXAM:  XR CHEST PORTABLE URGENT 1V                          EXAM:  XR CHEST PORTABLE ROUTINE 1V                                  PROCEDURE DATE:  2021          INTERPRETATION:  Portable chest radiograph    CLINICAL INFORMATION: Sepsis    TECHNIQUE:  Portable  AP view of the chest was obtained.    COMPARISON: 3/2/2020 available for review.    FINDINGS:    The lungs show mild perihilar and RIGHT greater than LEFT basilar diffuse airspace disease with bronchial wall thickening  concerning for bronchiectasis. No large airspace consolidation or effusion.  No pneumothorax.    There is mild cardiomegaly.    Healed RIGHT mid clavicle fracture deformity.      IMPRESSION:   Bilateral mild diffuse airspace disease with bronchial wall thickening....    FOLLOW-UP AP PORTABLE CHEST RADIOGRAPH 2021 5:26 PM:  No interval change. Bilateral diffuse infiltrates with bronchial wall thickening.        Assessment :  EXAM:  CT RENAL STONE HUNT                                  PROCEDURE DATE:  2021          INTERPRETATION:  CLINICAL INFORMATION: Flank pain. BIB family for fever and hematuria, had cystoscopy a couple of weeks ago, was on abx for 3d after, had occ sm amt of hematuria after, tonight about 2 hr PTA had yudelka hematuria and chills, has also had n/v. Evaluate for kidney stone.    COMPARISON: None.    CONTRAST/COMPLICATIONS:  IV Contrast: None  Oral Contrast: None  Complications: None    TECHNIQUE: CT scan of the abdomen and pelvis was performed from the domes of the diaphragm to the symphysis pubis without oral or intravenous contrast. Coronal and sagittal reformatted images are provided.    FINDINGS:  There is motion artifact present which degrades image quality and limits evaluation.    The heart is not enlarged. There is no pericardial effusion. Mucoid impacted distal airways left lung base.    Evaluation of the parenchymal organs and vascular structures is limited without intravenous contrast.    Allowing for motion no intrarenal or ureteral calculus is identified. There is fullness of the left renal pelvis without significant perinephric stranding. No significant perinephric stranding or hydronephrosis on the right. The unenhanced appearance of the liver, gallbladder, pancreas and spleen are unremarkable aside from gallbladder sludge.    The small and large bowel are normal in caliber without evidence of obstruction. The appendix is normal. There is no colonic wall thickening or pericolonic inflammatory change suggested.    The abdominal aorta is normal in caliber. There is no retroperitoneal, pelvic or inguinal adenopathy.    There is circumferential bladder wall thickening for the degree of distention and some stranding in the perivesicular fat. There are multiple bladder diverticula, the posterior ones containing layering high density material likely layering calcifications. The largest is on the right measuring 6.0 x 5.7 cm. The prostate gland is upper limits normal in size at 5 cm transaxial dimension.    Degenerative changes are seen throughout the lumbar spine. There are no acute osseous abnormalities.    IMPRESSION:  Motion degraded exam. No evidence of nephrolithiasis or obstructive uropathy. Bladder wall thickening with perivesicular fat stranding suggesting cystitis. Multiple bladder diverticula with likely layering calcifications within the posterior diverticula and along the posterior bladder wall. Fullness left renal pelvis. Please correlate clinically with urinalysis and urine culture.      Assessment :   81M with HTN, BPH recent cystoscopy about 2 weeks ago (for evaluation for chronic bacteriuria) admitted with acute cystitis with hematuria complicated by severe sepsis with septic shock and acute respiratory failure. febrile Tmax 103.5  Intubated and on pressors 21  Possible Asp pna  CHF   HORACIO possible AUR- arrington attempted       Plan :   Repeat cultures  Cont Meropenam,   Fu cultures  Trend temps and cbc  Urology eval for arrington placement  Vent and pressors per ICU      Continue with present regiment.  Appropriate use of antibiotics and adverse effects reviewed.    I have discussed the above plan of care with patient/ family in detail. They expressed understanding of the the treatment plan . Risks, benefits and alternatives discussed in detail. I have asked if they have any questions or concerns and appropriately addressed them to the best of my ability .      Critical care time greater then 35 minutes reviewing notes, labs data/ imaging , discussion with multidisciplinary team.    Thank you for allowing me to participate in care of your patient .        Frank Hernandez MD  Infectious Disease  [phone]

## 2021-05-08 NOTE — PROVIDER CONTACT NOTE (EICU) - ASSESSMENT
Could not camera in to the room because of  issues. Discussed with Sb over telephone who has seen patient on admission and during acute event. Acidosis on ABG; elevated BNP, rising Cr; stabilizing lactate. Suspect fluid overload with progressive septic shock.

## 2021-05-08 NOTE — CONSULT NOTE ADULT - SUBJECTIVE AND OBJECTIVE BOX
CHIEF COMPLAINT:  intubated - urinary retention    HISTORY OF PRESENT ILLNESS:    Admitted yesterday with fever, altered mental status and several days of hematuria following a cystoscope 2 weeks ago (done to evaluate chronic bacteriuria).  He became acutely unstable overnight and is now intubated on levophed.  Blood work reflects acutely worsening renal function.  Patient now intubated - sn hock, staff unable to place White and  Consult called.      PAST MEDICAL & SURGICAL HISTORY:  Hypertension    Hyperlipidemia    BPH (benign prostatic hyperplasia)    History of prostate surgery        REVIEW OF SYSTEMS:    unable to answer-intubated    MEDICATIONS  (STANDING):  chlorhexidine 2% Cloths 1 Application(s) Topical daily  dorzolamide 2%/timolol 0.5% Ophthalmic Solution 1 Drop(s) Both EYES two times a day  hydrocortisone sodium succinate Injectable 100 milliGRAM(s) IV Push every 8 hours  latanoprost 0.005% Ophthalmic Solution 1 Drop(s) Both EYES at bedtime  meropenem  IVPB 1000 milliGRAM(s) IV Intermittent every 8 hours  midodrine 10 milliGRAM(s) Oral every 8 hours  norepinephrine Infusion 0.05 MICROgram(s)/kG/Min (3.56 mL/Hr) IV Continuous <Continuous>  propofol Infusion 10 MICROgram(s)/kG/Min (4.56 mL/Hr) IV Continuous <Continuous>    MEDICATIONS  (PRN):  acetaminophen   Tablet .. 650 milliGRAM(s) Oral every 6 hours PRN Temp greater or equal to 38C (100.4F), Mild Pain (1 - 3)  midazolam Injectable 2 milliGRAM(s) IV Push every 30 minutes PRN sedation - intubated      Allergies    No Known Allergies    Intolerances        SOCIAL HISTORY:    FAMILY HISTORY:      Vital Signs Last 24 Hrs  T(C): 36.9 (08 May 2021 16:02), Max: 39.7 (08 May 2021 05:45)  T(F): 98.4 (08 May 2021 16:02), Max: 103.5 (08 May 2021 05:45)  HR: 83 (08 May 2021 16:00) (83 - 116)  BP: 100/59 (08 May 2021 16:00) (63/51 - 118/73)  BP(mean): 72 (08 May 2021 16:00) (51 - 106)  RR: 17 (08 May 2021 16:00) (0 - 39)  SpO2: 98% (08 May 2021 16:00) (81% - 100%)    PHYSICAL EXAM:    Constitutional: NAD, well-developed, intubated, obese  HEENT normocephalic, intubated  Neck: No LAD, No JVD  Back: Normal spine flexure, No CVA tenderness  Respiratory: CTAB   Cardiovascular: S1 and S2, RRR, no M/G/R  Abd: BS+, obese, soft, NT/ND, No CVAT  : Normal circumcised  phallus, patent  meatus, bilateral descended testes, no masses  DAVID:not examined  Extremities: l edema  Vascular: 2+ peripheral pulses  Neurological: Aintubated        LABS:                        10.7   14.05 )-----------( 66       ( 08 May 2021 14:06 )             32.5     05    138  |  108  |  40<H>  ----------------------------<  97  5.7<H>   |  18<L>  |  2.61<H>    Ca    5.4<LL>      08 May 2021 14:06  Phos  4.2     -  Mg     1.6         TPro  5.0<L>  /  Alb  2.2<L>  /  TBili  2.5<H>  /  DBili  x   /  AST  124<H>  /  ALT  102<H>  /  AlkPhos  40  -08    PT/INR - ( 07 May 2021 07:26 )   PT: 16.2 sec;   INR: 1.36 ratio         PTT - ( 07 May 2021 07:26 )  PTT:37.5 sec  Urinalysis Basic - ( 06 May 2021 22:26 )    Color: Sabiha / Appearance: Turbid / S.015 / pH: x  Gluc: x / Ketone: Negative  / Bili: Negative / Urobili: Negative mg/dL   Blood: x / Protein: 100 mg/dL / Nitrite: Positive   Leuk Esterase: Moderate / RBC: >50 /HPF / WBC 0-2   Sq Epi: x / Non Sq Epi: Occasional / Bacteria: Occasional      Urine Culture:  @ 13:22  Urine Culure Resuls   No growth to date.  Organism --    Hematocrit: 32.5 % ( @ 14:06)  Hemoglobin: 10.7 g/dL ( @ 14:06)  Hemoglobin: 10.8 g/dL ( @ 06:40)  Hematocrit: 33.0 % ( @ 06:40)  Hematocrit: 32.3 % ( @ 07:26)  Hemoglobin: 10.6 g/dL ( @ 07:26)  Hemoglobin: 12.7 g/dL ( @ 22:38)  Hematocrit: 38.2 % ( @ 22:38)      RADIOLOGY & ADDITIONAL STUDIES:    < from: CT Renal Stone Hunt (21 @ 23:30) >  EXAM:  CT RENAL STONE HUNT                                  PROCEDURE DATE:  2021          INTERPRETATION:  CLINICAL INFORMATION: Flank pain. BIB family for fever and hematuria, had cystoscopy a couple of weeks ago, was on abx for 3d after, had occ sm amt of hematuria after, tonight about 2 hr PTA had yudelka hematuria and chills, has also had n/v. Evaluate for kidney stone.    COMPARISON: None.    CONTRAST/COMPLICATIONS:  IV Contrast: None  Oral Contrast: None  Complications: None    TECHNIQUE: CT scan of the abdomen and pelvis was performed from the domes of the diaphragm to the symphysis pubis without oral or intravenous contrast. Coronal and sagittal reformatted images are provided.    FINDINGS:  There is motion artifact present which degrades image quality and limits evaluation.    The heart is not enlarged. There is no pericardial effusion. Mucoid impacted distal airways left lung base.    Evaluation of the parenchymal organs and vascular structures is limited without intravenous contrast.    Allowing for motion no intrarenal or ureteral calculus is identified. There is fullness of the left renal pelvis without significant perinephric stranding. No significant perinephric stranding or hydronephrosis on the right. The unenhanced appearance of the liver, gallbladder, pancreas and spleen are unremarkable aside from gallbladder sludge.    The small and large bowel are normal in caliber without evidence of obstruction. The appendix is normal. There is no colonic wall thickening or pericolonic inflammatory change suggested.    The abdominal aorta is normal in caliber. There is no retroperitoneal, pelvic or inguinal adenopathy.    There is circumferential bladder wall thickening for the degree of distention and some stranding in the perivesicular fat. There are multiple bladder diverticula, the posterior ones containing layering high density material likely layering calcifications. The largest is on the right measuring 6.0 x 5.7 cm. The prostate gland is upper limits normal in size at 5 cm transaxial dimension.    Degenerative changes are seen throughout the lumbar spine. There are no acute osseous abnormalities.    IMPRESSION:  Motion degraded exam. No evidence of nephrolithiasis or obstructive uropathy. Bladder wall thickening with perivesicular fat stranding suggesting cystitis. Multiple bladder diverticula with likely layering calcifications within the posterior diverticula and along the posterior bladder wall. Fullness left renal pelvis. Please correlate clinically with urinalysis and urine culture.              DAYSI CASTILLO MD; Attending Radiologist  This document has been electronically signed. May  7 2021 12:24AM    < end of copied text >  < from: US Abdomen Complete (US Abdomen Complete .) (21 @ 16:28) >      < end of copied text >

## 2021-05-08 NOTE — PROCEDURE NOTE - ADDITIONAL PROCEDURE DETAILS
OGT placed for TF and medications admistration, pt admitted for septic shock and intubated for poor mental status
Pending CXR for proper tube placement.

## 2021-05-08 NOTE — PROVIDER CONTACT NOTE (CRITICAL VALUE NOTIFICATION) - SITUATION
patient has elevated lactate of 2.6
Pt in ED for sepsis work up
Patient has elevated lactate  of 4.5
pt admitted with sepsis

## 2021-05-08 NOTE — PROGRESS NOTE ADULT - SUBJECTIVE AND OBJECTIVE BOX
Date/Time Patient Seen:  		  Referring MD:   Data Reviewed	       Patient is a 81y old  Male who presents with a chief complaint of hematuria, nausea, weakness (08 May 2021 11:39)      Subjective/HPI     PAST MEDICAL & SURGICAL HISTORY:  Hypertension    Hyperlipidemia    BPH (benign prostatic hyperplasia)    History of prostate surgery          Medication list         MEDICATIONS  (STANDING):  dexMEDEtomidine Infusion 0.3 MICROgram(s)/kG/Hr (5.7 mL/Hr) IV Continuous <Continuous>  dorzolamide 2%/timolol 0.5% Ophthalmic Solution 1 Drop(s) Both EYES two times a day  hydrocortisone sodium succinate Injectable 100 milliGRAM(s) IV Push every 8 hours  latanoprost 0.005% Ophthalmic Solution 1 Drop(s) Both EYES at bedtime  meropenem  IVPB 1000 milliGRAM(s) IV Intermittent every 8 hours  midodrine 10 milliGRAM(s) Oral every 8 hours  norepinephrine Infusion 0.05 MICROgram(s)/kG/Min (3.56 mL/Hr) IV Continuous <Continuous>    MEDICATIONS  (PRN):  acetaminophen   Tablet .. 650 milliGRAM(s) Oral every 6 hours PRN Temp greater or equal to 38C (100.4F), Mild Pain (1 - 3)  morphine  - Injectable 2 milliGRAM(s) IV Push every 4 hours PRN Breakthrough Pain  oxyCODONE    IR 5 milliGRAM(s) Oral every 4 hours PRN Moderate Pain (4 - 6)  oxyCODONE    IR 10 milliGRAM(s) Oral every 4 hours PRN Severe Pain (7 - 10)         Vitals log        ICU Vital Signs Last 24 Hrs  T(C): 38.6 (08 May 2021 09:03), Max: 39.7 (08 May 2021 05:45)  T(F): 101.4 (08 May 2021 09:03), Max: 103.5 (08 May 2021 05:45)  HR: 98 (08 May 2021 11:30) (88 - 116)  BP: 111/64 (08 May 2021 11:30) (63/51 - 118/73)  BP(mean): 78 (08 May 2021 11:30) (51 - 92)  ABP: --  ABP(mean): --  RR: 24 (08 May 2021 11:30) (0 - 39)  SpO2: 95% (08 May 2021 11:30) (81% - 100%)           Input and Output:  I&O's Detail    07 May 2021 07:01  -  08 May 2021 07:00  --------------------------------------------------------  IN:    IV PiggyBack: 200 mL    Lactated Ringers: 2200 mL    Lactated Ringers Bolus: 1250 mL    Norepinephrine: 64.5 mL    Oral Fluid: 100 mL    Sodium Chloride 0.9% Bolus: 1000 mL  Total IN: 4814.5 mL    OUT:    Voided (mL): 1000 mL  Total OUT: 1000 mL    Total NET: 3814.5 mL      08 May 2021 07:01  -  08 May 2021 11:56  --------------------------------------------------------  IN:    IV PiggyBack: 250 mL    Sodium Bicarbonate: 50 mL  Total IN: 300 mL    OUT:  Total OUT: 0 mL    Total NET: 300 mL          Lab Data                        10.8   11.90 )-----------( 65       ( 08 May 2021 06:40 )             33.0     05-08    136  |  106  |  39<H>  ----------------------------<  78  5.1   |  17<L>  |  2.81<H>    Ca    7.4<L>      08 May 2021 06:40  Phos  4.2     05-08  Mg     1.6     05-08    TPro  5.6<L>  /  Alb  2.5<L>  /  TBili  2.1<H>  /  DBili  x   /  AST  133<H>  /  ALT  118<H>  /  AlkPhos  45  05-08    ABG - ( 08 May 2021 09:09 )  pH, Arterial: 7.22  pH, Blood: x     /  pCO2: 38    /  pO2: 72    / HCO3: 15    / Base Excess: -11.2 /  SaO2: 92                      Review of Systems	      Objective     Physical Examination    heart s1s2  lung dec BS  abd soft  head nc  on BIPAP  on Pressors      Pertinent Lab findings & Imaging      Christopher:  NO   Adequate UO     I&O's Detail    07 May 2021 07:01  -  08 May 2021 07:00  --------------------------------------------------------  IN:    IV PiggyBack: 200 mL    Lactated Ringers: 2200 mL    Lactated Ringers Bolus: 1250 mL    Norepinephrine: 64.5 mL    Oral Fluid: 100 mL    Sodium Chloride 0.9% Bolus: 1000 mL  Total IN: 4814.5 mL    OUT:    Voided (mL): 1000 mL  Total OUT: 1000 mL    Total NET: 3814.5 mL      08 May 2021 07:01  -  08 May 2021 11:56  --------------------------------------------------------  IN:    IV PiggyBack: 250 mL    Sodium Bicarbonate: 50 mL  Total IN: 300 mL    OUT:  Total OUT: 0 mL    Total NET: 300 mL               Discussed with:     Cultures:	        Radiology

## 2021-05-08 NOTE — PROVIDER CONTACT NOTE (CRITICAL VALUE NOTIFICATION) - TEST AND RESULT REPORTED:
Elevated Lactate
WBC 0.78
positive blood culture /growth in aerobic bottle gram positive
lactate 2.8. ca 5.4
lactate=2.1
elevated lactate
lactate level 3.9

## 2021-05-08 NOTE — CONSULT NOTE ADULT - SUBJECTIVE AND OBJECTIVE BOX
Hematology/Oncology consult     Patient is seen and examined  notes/labs reviewed  consult dictated,  Job #    contact #  corin----emre downs  phone # [phone]    IMPRESSION:      RECOMMENDATION:              ,thanks for courtsey of this consult,will follow this pt with you for hem/onc issue while pt is in hospital. 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 Possible pyelonephritis.    COMPARISON: CT abdomen/pelvis May 06, 2021    TECHNIQUE: Sonography of the abdomen.    FINDINGS:    Liver: Mildly enlarged, measuring 19.1 cm. Uniform parenchymal echogenicity. No focal lesion. Smooth hepatic contours.  Bile ducts: Normal caliber. Common bile duct measures 6 mm.  Gallbladder: No stones or pericholecystic fluid. Nonspecific wall thickening, measuring 5 mm.  Pancreas: Visualized portions are within normal limits.  Spleen: 9.9 cm. Within normal limits.  Right kidney: 11.1 cm. No hydronephrosis.  Left kidney: 10.3 cm.  No hydronephrosis.  Ascites: None.  Aorta and IVC: Visualized portions are within normal limits.  Other: Trace right pleural effusion.    IMPRESSION:  Mild hepatomegaly.  Nonspecific gallbladder wall thickening.  Right pleural effusion.                PATRIA BARKSDALE MD; AttendingRadiologist  This document has been electronically signed. May  7 2021  4:58PM    < end of copied text >    IMPRESSION:    81M with HTN, BPH who presents with hematuria and weakness.  Patient had a cystoscopy about 2 weeks ago (for evaluation for chronic bacteriuria) and since then has had intermittent hematuria.  Was given 3 days of an abx post-procedure.  Then today (Thursday), patient noted gross blood from his urine.  Patient also started to have chills, fevers, and nausea.  No pain.  No recent sick contacts.   COVID vaccine with Pfizer was done >1 month ago.  No other complaints.  Brought here for further evaluation.  In the ED, patient's triage VS were /65  HR 97  RR 24, 95%, T 101.2F.  Physical exam revealed that he was becoming altered (did not know where he was).  Labs were significant for leukopenia at WBC 0.78, Cr 1.34, lactate 2.3, and UA with +nitrite, mod LE with 0-2 WBC, large blood with >50 RBC, occasional bacteria.  Was started on ceftriaxone empirically.  CT renal showed possible cystitis, multiple bladder diverticula with likely layering calcifications within the posterior diverticula and along the posterior bladder wall, fullness in left renal pelvis.  Patient also had a head CT for AMS.  However, niece said that the patient was more lucid after the fluids and is no longer altered and responding accordingly.  Patient is being admitted on 5/7/21 for sepsis 2/2 UTI/ infection.    Hematology was consulted by Dr Zayas for thrombocytopenia while i was rounding at [facility] on 5/8/21 and i saw patient in few minutes consult was requested on 5/8/21, this am patient became hypotensive, was given iv fluids, placed on levophed, patient in septic shock possible and is on antibiotics as per id, patient was placed on bipap as per pulmonary and for possible intubation--final decision as per pulmonary/critical care  septic shock possible  hypotension  resp distress--pul following, on bipap at time of consult--possible intubation--final decision per critical care/pull  renal failure and worsening renal failure  thrombocytopenia--hem was consulted  anemia    RECOMMENDATION:  anemia/thrombocytopenia--likely multifactorial, work up to exclude treatable etiologies  iron/ferritin/b12/filate/retic/ldh/hapto/spep/fobt  smear requested -will review when ready  no splenomegaly on us  monitor cbc  cystitis, s/p cystoscopy--urology eval and follow up  renal failure--consider nephrology evaluation  septic shock--follow id  resp distress--management per pul  gi/dvtp--scd  called and d/w pt family--niece regan and son waqas pierce jefry, d/w icu attending and pulmonary      Dr        ,thanks for courtsey of this consult,will follow this pt with you for hem/onc issue while pt is in hospital. 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 Possible pyelonephritis.    COMPARISON: CT abdomen/pelvis May 06, 2021    TECHNIQUE: Sonography of the abdomen.    FINDINGS:    Liver: Mildly enlarged, measuring 19.1 cm. Uniform parenchymal echogenicity. No focal lesion. Smooth hepatic contours.  Bile ducts: Normal caliber. Common bile duct measures 6 mm.  Gallbladder: No stones or pericholecystic fluid. Nonspecific wall thickening, measuring 5 mm.  Pancreas: Visualized portions are within normal limits.  Spleen: 9.9 cm. Within normal limits.  Right kidney: 11.1 cm. No hydronephrosis.  Left kidney: 10.3 cm.  No hydronephrosis.  Ascites: None.  Aorta and IVC: Visualized portions are within normal limits.  Other: Trace right pleural effusion.    IMPRESSION:  Mild hepatomegaly.  Nonspecific gallbladder wall thickening.  Right pleural effusion.                PATRIA BARKSDALE MD; AttendingRadiologist  This document has been electronically signed. May  7 2021  4:58PM    < end of copied text >    IMPRESSION:    81M with HTN, BPH who presents with hematuria and weakness.  Patient had a cystoscopy about 2 weeks ago (for evaluation for chronic bacteriuria) and since then has had intermittent hematuria.  Was given 3 days of an abx post-procedure.  Then today (Thursday), patient noted gross blood from his urine.  Patient also started to have chills, fevers, and nausea.  No pain.  No recent sick contacts.   COVID vaccine with Pfizer was done >1 month ago.  No other complaints.  Brought here for further evaluation.  In the ED, patient's triage VS were /65  HR 97  RR 24, 95%, T 101.2F.  Physical exam revealed that he was becoming altered (did not know where he was).  Labs were significant for leukopenia at WBC 0.78, Cr 1.34, lactate 2.3, and UA with +nitrite, mod LE with 0-2 WBC, large blood with >50 RBC, occasional bacteria.  Was started on ceftriaxone empirically.  CT renal showed possible cystitis, multiple bladder diverticula with likely layering calcifications within the posterior diverticula and along the posterior bladder wall, fullness in left renal pelvis.  Patient also had a head CT for AMS.  However, niece said that the patient was more lucid after the fluids and is no longer altered and responding accordingly.  Patient is being admitted on 5/7/21 for sepsis 2/2 UTI/ infection.    Hematology was consulted by Dr Zayas for thrombocytopenia while i was rounding at [place] on 5/8/21 and i saw patient in few minutes consult was requested on 5/8/21, this am patient became hypotensive, was given iv fluids, placed on levophed, patient in septic shock possible and is on antibiotics as per id, patient was placed on bipap as per pulmonary and for possible intubation--final decision as per pulmonary/critical care  septic shock possible  hypotension  resp distress--pul following, on bipap at time of consult--possible intubation--final decision per critical care/pull  renal failure and worsening renal failure  thrombocytopenia--hem was consulted  anemia    RECOMMENDATION:  anemia/thrombocytopenia--likely multifactorial, work up to exclude treatable etiologies  iron/ferritin/b12/filate/retic/ldh/hapto/spep/fobt  smear requested -will review when ready  no splenomegaly on us  monitor cbc  cystitis, s/p cystoscopy--urology eval and follow up  renal failure--consider nephrology evaluation  septic shock--follow id  resp distress--management per pul  gi/dvtp--scd  called and d/w pt family--niece regan and son waqas pierce jefry, d/w icu attending and pulmonary    addendum--- 2;30 pm--  repeat hb 10.7, platelets 66 k  smear--- elevated WBC, series and morphology slight left shift, no apparent blast cells or immature wbc, no schistocytes or fragmented rbc, platelets are about 5 per field, no clumping of platelets or giant platelets.  monitor cbc--transfuse blood product as indicated                ,thanks for courtsey of this consult,will follow this pt with you for hem/onc issue while pt is in hospital.

## 2021-05-08 NOTE — PROVIDER CONTACT NOTE (CRITICAL VALUE NOTIFICATION) - ACTION/TREATMENT ORDERED:
NO NEW ORDERS.
Antibiotics, fluids, admission
No new orders placed at this time
FLUID BOLUS IN PROGRESS.NO NEW ORDERES.wILL REPEAT TEST
Dr Dye will evaluate the patient
NS 1L bolus

## 2021-05-09 LAB
ANION GAP SERPL CALC-SCNC: 14 MMOL/L — SIGNIFICANT CHANGE UP (ref 5–17)
BUN SERPL-MCNC: 38 MG/DL — HIGH (ref 7–23)
CALCIUM SERPL-MCNC: 7.4 MG/DL — LOW (ref 8.4–10.5)
CHLORIDE SERPL-SCNC: 112 MMOL/L — HIGH (ref 96–108)
CO2 SERPL-SCNC: 18 MMOL/L — LOW (ref 22–31)
COVID-19 SPIKE DOMAIN AB INTERP: POSITIVE
COVID-19 SPIKE DOMAIN ANTIBODY RESULT: >250 U/ML — HIGH
CREAT SERPL-MCNC: 2.27 MG/DL — HIGH (ref 0.5–1.3)
CULTURE RESULTS: NO GROWTH — SIGNIFICANT CHANGE UP
CULTURE RESULTS: SIGNIFICANT CHANGE UP
FERRITIN SERPL-MCNC: 374 NG/ML — SIGNIFICANT CHANGE UP (ref 30–400)
FIBRINOGEN PPP-MCNC: 476 MG/DL — SIGNIFICANT CHANGE UP (ref 290–520)
FOLATE SERPL-MCNC: 8.7 NG/ML — SIGNIFICANT CHANGE UP
GLUCOSE SERPL-MCNC: 119 MG/DL — HIGH (ref 70–99)
GRAM STN FLD: SIGNIFICANT CHANGE UP
GRAM STN FLD: SIGNIFICANT CHANGE UP
HAPTOGLOB SERPL-MCNC: 96 MG/DL — SIGNIFICANT CHANGE UP (ref 34–200)
HCO3 BLDA-SCNC: 18 MMOL/L — LOW (ref 23–27)
HCT VFR BLD CALC: 35.2 % — LOW (ref 39–50)
HGB BLD-MCNC: 11.6 G/DL — LOW (ref 13–17)
MAGNESIUM SERPL-MCNC: 1.9 MG/DL — SIGNIFICANT CHANGE UP (ref 1.6–2.6)
MCHC RBC-ENTMCNC: 29.6 PG — SIGNIFICANT CHANGE UP (ref 27–34)
MCHC RBC-ENTMCNC: 33 GM/DL — SIGNIFICANT CHANGE UP (ref 32–36)
MCV RBC AUTO: 89.8 FL — SIGNIFICANT CHANGE UP (ref 80–100)
NRBC # BLD: 0 /100 WBCS — SIGNIFICANT CHANGE UP (ref 0–0)
NT-PROBNP SERPL-SCNC: HIGH PG/ML (ref 0–450)
PCO2 BLDA: 34 MMHG — SIGNIFICANT CHANGE UP (ref 32–46)
PH BLDA: 7.31 — LOW (ref 7.35–7.45)
PHOSPHATE SERPL-MCNC: 4.5 MG/DL — SIGNIFICANT CHANGE UP (ref 2.5–4.5)
PLATELET # BLD AUTO: 59 K/UL — LOW (ref 150–400)
PO2 BLDA: 120 MMHG — HIGH (ref 74–108)
POTASSIUM SERPL-MCNC: 5 MMOL/L — SIGNIFICANT CHANGE UP (ref 3.5–5.3)
POTASSIUM SERPL-SCNC: 5 MMOL/L — SIGNIFICANT CHANGE UP (ref 3.5–5.3)
RBC # BLD: 3.92 M/UL — LOW (ref 4.2–5.8)
RBC # FLD: 13.6 % — SIGNIFICANT CHANGE UP (ref 10.3–14.5)
SAO2 % BLDA: 98 % — HIGH (ref 92–96)
SARS-COV-2 IGG+IGM SERPL QL IA: >250 U/ML — HIGH
SARS-COV-2 IGG+IGM SERPL QL IA: POSITIVE
SODIUM SERPL-SCNC: 144 MMOL/L — SIGNIFICANT CHANGE UP (ref 135–145)
SPECIMEN SOURCE: SIGNIFICANT CHANGE UP
VIT B12 SERPL-MCNC: 1096 PG/ML — SIGNIFICANT CHANGE UP (ref 232–1245)
WBC # BLD: 20.59 K/UL — HIGH (ref 3.8–10.5)
WBC # FLD AUTO: 20.59 K/UL — HIGH (ref 3.8–10.5)

## 2021-05-09 PROCEDURE — 99291 CRITICAL CARE FIRST HOUR: CPT

## 2021-05-09 RX ORDER — DEXMEDETOMIDINE HYDROCHLORIDE IN 0.9% SODIUM CHLORIDE 4 UG/ML
0.05 INJECTION INTRAVENOUS
Qty: 200 | Refills: 0 | Status: DISCONTINUED | OUTPATIENT
Start: 2021-05-09 | End: 2021-05-09

## 2021-05-09 RX ORDER — ALBUTEROL 90 UG/1
2 AEROSOL, METERED ORAL EVERY 8 HOURS
Refills: 0 | Status: DISCONTINUED | OUTPATIENT
Start: 2021-05-09 | End: 2021-05-11

## 2021-05-09 RX ORDER — PANTOPRAZOLE SODIUM 20 MG/1
40 TABLET, DELAYED RELEASE ORAL DAILY
Refills: 0 | Status: DISCONTINUED | OUTPATIENT
Start: 2021-05-09 | End: 2021-05-14

## 2021-05-09 RX ORDER — PIPERACILLIN AND TAZOBACTAM 4; .5 G/20ML; G/20ML
3.38 INJECTION, POWDER, LYOPHILIZED, FOR SOLUTION INTRAVENOUS EVERY 8 HOURS
Refills: 0 | Status: COMPLETED | OUTPATIENT
Start: 2021-05-09 | End: 2021-05-13

## 2021-05-09 RX ADMIN — PIPERACILLIN AND TAZOBACTAM 25 GRAM(S): 4; .5 INJECTION, POWDER, LYOPHILIZED, FOR SOLUTION INTRAVENOUS at 21:38

## 2021-05-09 RX ADMIN — MEROPENEM 100 MILLIGRAM(S): 1 INJECTION INTRAVENOUS at 13:19

## 2021-05-09 RX ADMIN — ALBUTEROL 2 PUFF(S): 90 AEROSOL, METERED ORAL at 23:07

## 2021-05-09 RX ADMIN — ALBUTEROL 2 PUFF(S): 90 AEROSOL, METERED ORAL at 15:26

## 2021-05-09 RX ADMIN — PANTOPRAZOLE SODIUM 40 MILLIGRAM(S): 20 TABLET, DELAYED RELEASE ORAL at 18:45

## 2021-05-09 RX ADMIN — MIDODRINE HYDROCHLORIDE 10 MILLIGRAM(S): 2.5 TABLET ORAL at 05:27

## 2021-05-09 RX ADMIN — Medication 100 MILLIGRAM(S): at 21:38

## 2021-05-09 RX ADMIN — MEROPENEM 100 MILLIGRAM(S): 1 INJECTION INTRAVENOUS at 05:24

## 2021-05-09 RX ADMIN — PROPOFOL 4.56 MICROGRAM(S)/KG/MIN: 10 INJECTION, EMULSION INTRAVENOUS at 11:14

## 2021-05-09 RX ADMIN — CHLORHEXIDINE GLUCONATE 1 APPLICATION(S): 213 SOLUTION TOPICAL at 11:14

## 2021-05-09 RX ADMIN — MIDODRINE HYDROCHLORIDE 10 MILLIGRAM(S): 2.5 TABLET ORAL at 21:38

## 2021-05-09 RX ADMIN — LATANOPROST 1 DROP(S): 0.05 SOLUTION/ DROPS OPHTHALMIC; TOPICAL at 21:38

## 2021-05-09 RX ADMIN — DORZOLAMIDE HYDROCHLORIDE TIMOLOL MALEATE 1 DROP(S): 20; 5 SOLUTION/ DROPS OPHTHALMIC at 17:35

## 2021-05-09 RX ADMIN — MIDODRINE HYDROCHLORIDE 10 MILLIGRAM(S): 2.5 TABLET ORAL at 13:19

## 2021-05-09 RX ADMIN — DORZOLAMIDE HYDROCHLORIDE TIMOLOL MALEATE 1 DROP(S): 20; 5 SOLUTION/ DROPS OPHTHALMIC at 05:27

## 2021-05-09 RX ADMIN — CHLORHEXIDINE GLUCONATE 15 MILLILITER(S): 213 SOLUTION TOPICAL at 01:23

## 2021-05-09 RX ADMIN — Medication 3.56 MICROGRAM(S)/KG/MIN: at 06:46

## 2021-05-09 RX ADMIN — DEXMEDETOMIDINE HYDROCHLORIDE IN 0.9% SODIUM CHLORIDE 0.95 MICROGRAM(S)/KG/HR: 4 INJECTION INTRAVENOUS at 06:11

## 2021-05-09 RX ADMIN — Medication 100 MILLIGRAM(S): at 13:18

## 2021-05-09 RX ADMIN — Medication 100 MILLIGRAM(S): at 05:24

## 2021-05-09 RX ADMIN — ALBUTEROL 2 PUFF(S): 90 AEROSOL, METERED ORAL at 07:59

## 2021-05-09 RX ADMIN — CHLORHEXIDINE GLUCONATE 15 MILLILITER(S): 213 SOLUTION TOPICAL at 11:13

## 2021-05-09 NOTE — CHART NOTE - NSCHARTNOTEFT_GEN_A_CORE
SBT this am -   am CBC noted - BMP pending  cont SBT today then AC - not ready for extubation today - will reassess in am tomorrow  discussed with nursing and respiratory on the unit

## 2021-05-09 NOTE — PROGRESS NOTE ADULT - ASSESSMENT
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     Septic Shock / Bacteremia   IV Meropenam. Given one dose IV Vancomcyin in the setting of Renal Failure (Cultures showing Gram+ chains and pairs)  BMP still pending; CBC reviewed  Infection does not seem to be  source given negative Urine Culture. Initially thought to be the case due to recent instrumentation in the setting of chornic bacteruria.  Repeat CT Imaging reviewed. Will have Cardiology look at TTE to rule out Endo  D/C Precedex   CC: Dr. Zayas; ID: Dr. Hernandez   Cardiology will be consulted     Hypoxic Respiratory Failure   Due to Sepsis and Volume Overload; BNP Elevated   Echo reviewed with no signs of failure; Diuresis not appropriate currently  Imaging does not show any obvious pulm infection  Repeat ABG    Acute Renal Failure   In the setting of Sepsis; Repeat BMP pending   Renally dose meds and monitor BMP and electrolytes     Metabolic Encephalopathy   Due to Sepsis       Thrombocytopenia  In the setting of Sepsis  Avoid LMWH  Hematology (Dr. Madera)    HTN  Hold anti-HTN meds    BPH  Hold Flomax    Diet  Regular    DVT Prophylaxis  SCD    Disposition  Full Code/Inpatient  Discharge planning pending hospital course   CC Time spent >60 minutes

## 2021-05-09 NOTE — DIETITIAN INITIAL EVALUATION ADULT. - PERTINENT LABORATORY DATA
5/8 WBC 20.5, hgb 11.6, hct 35.2, BUN 38, creat 2.27, alb 2.5, bilirubin 3.5, , , lactate 2.1, probnp >3500

## 2021-05-09 NOTE — PROGRESS NOTE ADULT - SUBJECTIVE AND OBJECTIVE BOX
Patient is a 81y old  Male who presents with a chief complaint of hematuria, nausea, weakness (09 May 2021 12:34)       Pt is seen and examined  pt is awake and lying in bed/out of bed to chair  pt seems comfortable and denies any complaints at this time    HPI:  81M with HTN, BPH who presents with hematuria and weakness.  Patient had a cystoscopy about 2 weeks ago (for evaluation for chronic bacteriuria) and since then has had intermittent hematuria.  Was given 3 days of an abx post-procedure.  Then today (Thursday), patient noted gross blood from his urine.  Patient also started to have chills, fevers, and nausea.  No pain.  No recent sick contacts.   COVID vaccine with Pfizer was done >1 month ago.  No other complaints.  Brought here for further evaluation.  In the ED, patient's triage VS were /65  HR 97  RR 24, 95%, T 101.2F.  Physical exam revealed that he was becoming altered (did not know where he was).  Labs were significant for leukopenia at WBC 0.78, Cr 1.34, lactate 2.3, and UA with +nitrite, mod LE with 0-2 WBC, large blood with >50 RBC, occasional bacteria.  Was started on ceftriaxone empirically.  CT renal showed possible cystitis, multiple bladder diverticula with likely layering calcifications within the posterior diverticula and along the posterior bladder wall, fullness in left renal pelvis.  Patient also had a head CT for AMS.  However, niece said that the patient was more lucid after the fluids and is no longer altered and responding accordingly.  Patient is being admitted for sepsis 2/2 UTI/ infection.   (07 May 2021 01:13)         ROS:  Negative except for:    MEDICATIONS  (STANDING):  ALBUTerol    90 MICROgram(s) HFA Inhaler 2 Puff(s) Inhalation every 8 hours  chlorhexidine 0.12% Liquid 15 milliLiter(s) Oral Mucosa two times a day  chlorhexidine 2% Cloths 1 Application(s) Topical daily  dorzolamide 2%/timolol 0.5% Ophthalmic Solution 1 Drop(s) Both EYES two times a day  hydrocortisone sodium succinate Injectable 100 milliGRAM(s) IV Push every 8 hours  latanoprost 0.005% Ophthalmic Solution 1 Drop(s) Both EYES at bedtime  meropenem  IVPB 1000 milliGRAM(s) IV Intermittent every 8 hours  midodrine 10 milliGRAM(s) Oral every 8 hours  norepinephrine Infusion 0.05 MICROgram(s)/kG/Min (3.56 mL/Hr) IV Continuous <Continuous>  propofol Infusion 10 MICROgram(s)/kG/Min (4.56 mL/Hr) IV Continuous <Continuous>    MEDICATIONS  (PRN):  acetaminophen   Tablet .. 650 milliGRAM(s) Oral every 6 hours PRN Temp greater or equal to 38C (100.4F), Mild Pain (1 - 3)      Allergies    No Known Allergies    Intolerances        Vital Signs Last 24 Hrs  T(C): 37.4 (09 May 2021 12:00), Max: 37.4 (09 May 2021 12:00)  T(F): 99.4 (09 May 2021 12:00), Max: 99.4 (09 May 2021 12:00)  HR: 79 (09 May 2021 14:30) (77 - 107)  BP: 105/65 (09 May 2021 14:30) (100/59 - 155/90)  BP(mean): 78 (09 May 2021 14:30) (72 - 110)  RR: 15 (09 May 2021 14:30) (11 - 27)  SpO2: 99% (09 May 2021 14:30) (96% - 100%)    PHYSICAL EXAM  General: adult in NAD  HEENT: clear oropharynx, anicteric sclera, pink conjunctiva  Neck: supple  CV: normal S1/S2 with no murmur rubs or gallops  Lungs: positive air movement b/l ant lungs,clear to auscultation, no wheezes, no rales  Abdomen: soft non-tender non-distended, no hepatosplenomegaly  Ext: no clubbing cyanosis or edema  Skin: no rashes and no petechiae  Neuro: alert and oriented X 4, no focal deficits  LABS:                          11.6   20.59 )-----------( 59       ( 09 May 2021 06:38 )             35.2         Mean Cell Volume : 89.8 fl  Mean Cell Hemoglobin : 29.6 pg  Mean Cell Hemoglobin Concentration : 33.0 gm/dL  Auto Neutrophil # : x  Auto Lymphocyte # : x  Auto Monocyte # : x  Auto Eosinophil # : x  Auto Basophil # : x  Auto Neutrophil % : x  Auto Lymphocyte % : x  Auto Monocyte % : x  Auto Eosinophil % : x  Auto Basophil % : x    Serial CBC's  05-09 @ 06:38  Hct-35.2 / Hgb-11.6 / Plat-59 / RBC-3.92 / WBC-20.59          Serial CBC's  05-08 @ 20:28  Hct-34.6 / Hgb-11.3 / Plat-65 / RBC-3.80 / WBC-16.03          Serial CBC's  05-08 @ 14:06  Hct-32.5 / Hgb-10.7 / Plat-66 / RBC-3.59 / WBC-14.05          Serial CBC's  05-08 @ 06:40  Hct-33.0 / Hgb-10.8 / Plat-65 / RBC-3.62 / WBC-11.90          Serial CBC's  05-07 @ 07:26  Hct-32.3 / Hgb-10.6 / Plat-111 / RBC-3.56 / WBC-3.85            05-09    144  |  112<H>  |  38<H>  ----------------------------<  119<H>  5.0   |  18<L>  |  2.27<H>    Ca    7.4<L>      09 May 2021 10:30  Phos  4.5     05-09  Mg     1.9     05-09    TPro  5.6<L>  /  Alb  2.5<L>  /  TBili  3.5<H>  /  DBili  x   /  AST  129<H>  /  ALT  110<H>  /  AlkPhos  55  05-08      PT/INR - ( 08 May 2021 20:28 )   PT: 16.2 sec;   INR: 1.36 ratio         PTT - ( 08 May 2021 20:28 )  PTT:43.3 sec    Ferritin, Serum: 374 ng/mL (05-09-21 @ 00:08)  Vitamin B12, Serum: 1096 pg/mL (05-09-21 @ 00:08)  Folate, Serum: 8.7 ng/mL (05-09-21 @ 00:08)  Iron - Total Binding Capacity.: 187 ug/dL (05-08-21 @ 22:33)  Reticulocyte Percent: 1.3 % (05-08-21 @ 14:06)                BLOOD SMEAR INTERPRETATION:       RADIOLOGY & ADDITIONAL STUDIES:     Patient is a 81y old  Male who presents with a chief complaint of hematuria, nausea, weakness (09 May 2021 12:34)       Pt is seen and examined  pt is is lying in bed  patient is in icu, orally intubated, on monitor hr is at 79/min, bp at 107/72, fio2 at 40 %  patient niece is at bedside    HPI:  81M with HTN, BPH who presents with hematuria and weakness.  Patient had a cystoscopy about 2 weeks ago (for evaluation for chronic bacteriuria) and since then has had intermittent hematuria.  Was given 3 days of an abx post-procedure.  Then today (Thursday), patient noted gross blood from his urine.  Patient also started to have chills, fevers, and nausea.  No pain.  No recent sick contacts.   COVID vaccine with Pfizer was done >1 month ago.  No other complaints.  Brought here for further evaluation.  In the ED, patient's triage VS were /65  HR 97  RR 24, 95%, T 101.2F.  Physical exam revealed that he was becoming altered (did not know where he was).  Labs were significant for leukopenia at WBC 0.78, Cr 1.34, lactate 2.3, and UA with +nitrite, mod LE with 0-2 WBC, large blood with >50 RBC, occasional bacteria.  Was started on ceftriaxone empirically.  CT renal showed possible cystitis, multiple bladder diverticula with likely layering calcifications within the posterior diverticula and along the posterior bladder wall, fullness in left renal pelvis.  Patient also had a head CT for AMS.  However, niece said that the patient was more lucid after the fluids and is no longer altered and responding accordingly.  Patient is being admitted for sepsis 2/2 UTI/ infection.   (07 May 2021 01:13)         ROS:  as per hpi    MEDICATIONS  (STANDING):  ALBUTerol    90 MICROgram(s) HFA Inhaler 2 Puff(s) Inhalation every 8 hours  chlorhexidine 0.12% Liquid 15 milliLiter(s) Oral Mucosa two times a day  chlorhexidine 2% Cloths 1 Application(s) Topical daily  dorzolamide 2%/timolol 0.5% Ophthalmic Solution 1 Drop(s) Both EYES two times a day  hydrocortisone sodium succinate Injectable 100 milliGRAM(s) IV Push every 8 hours  latanoprost 0.005% Ophthalmic Solution 1 Drop(s) Both EYES at bedtime  meropenem  IVPB 1000 milliGRAM(s) IV Intermittent every 8 hours  midodrine 10 milliGRAM(s) Oral every 8 hours  norepinephrine Infusion 0.05 MICROgram(s)/kG/Min (3.56 mL/Hr) IV Continuous <Continuous>  propofol Infusion 10 MICROgram(s)/kG/Min (4.56 mL/Hr) IV Continuous <Continuous>    MEDICATIONS  (PRN):  acetaminophen   Tablet .. 650 milliGRAM(s) Oral every 6 hours PRN Temp greater or equal to 38C (100.4F), Mild Pain (1 - 3)      Allergies    No Known Allergies    Intolerances        Vital Signs Last 24 Hrs  T(C): 37.4 (09 May 2021 12:00), Max: 37.4 (09 May 2021 12:00)  T(F): 99.4 (09 May 2021 12:00), Max: 99.4 (09 May 2021 12:00)  HR: 79 (09 May 2021 14:30) (77 - 107)  BP: 105/65 (09 May 2021 14:30) (100/59 - 155/90)  BP(mean): 78 (09 May 2021 14:30) (72 - 110)  RR: 15 (09 May 2021 14:30) (11 - 27)  SpO2: 99% (09 May 2021 14:30) (96% - 100%)    PHYSICAL EXAM  General: adult in NAD  HEENT: clear oropharynx, anicteric sclera, pink conjunctiva  Neck: supple  CV: normal S1/S2 with no murmur rubs or gallops  Lungs: positive air movement b/l ant lungs,clear to auscultation, no wheezes, no rales  Abdomen: soft non-tender non-distended, no hepatosplenomegaly  Ext: no clubbing cyanosis or edema  Skin: no rashes and no petechiae  Neuro: alert and oriented X 4, no focal deficits  LABS:                          11.6   20.59 )-----------( 59       ( 09 May 2021 06:38 )             35.2         Mean Cell Volume : 89.8 fl  Mean Cell Hemoglobin : 29.6 pg  Mean Cell Hemoglobin Concentration : 33.0 gm/dL  Auto Neutrophil # : x  Auto Lymphocyte # : x  Auto Monocyte # : x  Auto Eosinophil # : x  Auto Basophil # : x  Auto Neutrophil % : x  Auto Lymphocyte % : x  Auto Monocyte % : x  Auto Eosinophil % : x  Auto Basophil % : x    Serial CBC's  05-09 @ 06:38  Hct-35.2 / Hgb-11.6 / Plat-59 / RBC-3.92 / WBC-20.59          Serial CBC's  05-08 @ 20:28  Hct-34.6 / Hgb-11.3 / Plat-65 / RBC-3.80 / WBC-16.03          Serial CBC's  05-08 @ 14:06  Hct-32.5 / Hgb-10.7 / Plat-66 / RBC-3.59 / WBC-14.05          Serial CBC's  05-08 @ 06:40  Hct-33.0 / Hgb-10.8 / Plat-65 / RBC-3.62 / WBC-11.90          Serial CBC's  05-07 @ 07:26  Hct-32.3 / Hgb-10.6 / Plat-111 / RBC-3.56 / WBC-3.85            05-09    144  |  112<H>  |  38<H>  ----------------------------<  119<H>  5.0   |  18<L>  |  2.27<H>    Ca    7.4<L>      09 May 2021 10:30  Phos  4.5     05-09  Mg     1.9     05-09    TPro  5.6<L>  /  Alb  2.5<L>  /  TBili  3.5<H>  /  DBili  x   /  AST  129<H>  /  ALT  110<H>  /  AlkPhos  55  05-08      PT/INR - ( 08 May 2021 20:28 )   PT: 16.2 sec;   INR: 1.36 ratio         PTT - ( 08 May 2021 20:28 )  PTT:43.3 sec    Ferritin, Serum: 374 ng/mL (05-09-21 @ 00:08)  Vitamin B12, Serum: 1096 pg/mL (05-09-21 @ 00:08)  Folate, Serum: 8.7 ng/mL (05-09-21 @ 00:08)  Iron - Total Binding Capacity.: 187 ug/dL (05-08-21 @ 22:33)  Reticulocyte Percent: 1.3 % (05-08-21 @ 14:06)    Haptoglobin, Serum (05.08.21 @ 22:33)    Haptoglobin, Serum: 96 mg/dL    Lactate Dehydrogenase, Serum (05.08.21 @ 22:33)    Lactate Dehydrogenase, Serum: 291 U/L    Fibrinogen Assay (05.09.21 @ 12:50)    Fibrinogen Assay: 476: Fibrinogen is now performed by a new,  PT-based methodology based upon  the entire clot formation. It is not affected by heparin, FDP or  thrombolytic agents unless the PT is excessively prolonged. An alternate  methodology, clottable fibrinogen, will be performed when the PT is  excessively  prolonged. In the presence of direct thrombin inhibitors  (argatroban, refludan), or direct Xa inhibitors (such as fondaparinux)  a  result may not be obtained. mg/dL                BLOOD SMEAR INTERPRETATION: elevated WBC, series and morphology slight left shift, no apparent blast cells or immature wbc, no schistocytes or fragmented rbc, platelets are about 8 per field, no clumping of platelets or giant platelets.        RADIOLOGY & ADDITIONAL STUDIES:

## 2021-05-09 NOTE — PROGRESS NOTE ADULT - SUBJECTIVE AND OBJECTIVE BOX
HPI: 82 y/o M w/ pmh htn, hld and bph presented to [facility] on 2021 for hematuria and weakness, was admitted to medicine for sepsis likely 2/2 to UTI pt was started on IVAB, pt developed hypotension on  and upgraded to SPCU for close observation on 2021 pt started to developed worsening septic shock and was intubated for airway protection and started on pressors.    24 hour events: Day time events reviewed pt intubated and central placed for pressors.    Review of Systems: Unable to obtain ROS pt intubated and sedated    T(F): 98.3 (21 @ 00:03), Max: 103.5 (21 @ 05:45)  HR: 95 (21 @ 00:08) (83 - 116)  BP: 112/64 (21 @ 00:08) (63/51 - 147/95)  RR: 17 (21 @ 00:08) (0 - 39)  SpO2: 97% (21 @ 00:08) (81% - 100%)  Wt(kg): --    Mode: AC/ CMV (Assist Control/ Continuous Mandatory Ventilation), RR (machine): 16, TV (machine): 500, FiO2: 40, PEEP: 5  21 @ 07:01  -  21 @ 07:00  --------------------------------------------------------  IN: 4814.5 mL / OUT: 1000 mL / NET: 3814.5 mL    21 @ 07:01  -  21 @ 00:19  --------------------------------------------------------  IN: 1400.4 mL / OUT: 650 mL / NET: 750.4 mL        CAPILLARY BLOOD GLUCOSE          I&O's Summary    07 May 2021 07:01  -  08 May 2021 07:00  --------------------------------------------------------  IN: 4814.5 mL / OUT: 1000 mL / NET: 3814.5 mL    08 May 2021 07:  -  09 May 2021 00:19  --------------------------------------------------------  IN: 1400.4 mL / OUT: 650 mL / NET: 750.4 mL        Physical Exam:   Gen: Comfortable in bed in NAD  Neuro: intubated and sedated  Resp: good air entry b/l  CVS: +RRR  Abd: BSx4, soft, nt/nd  Ext: no edema   Skin: warm/dry    Meds:  meropenem  IVPB IV Intermittent    midodrine Oral  norepinephrine Infusion IV Continuous    hydrocortisone sodium succinate Injectable IV Push      acetaminophen   Tablet .. Oral PRN  midazolam Injectable IV Push PRN  propofol Infusion IV Continuous      chlorhexidine 0.12% Liquid Oral Mucosa  chlorhexidine 2% Cloths Topical  dorzolamide 2%/timolol 0.5% Ophthalmic Solution Both EYES  latanoprost 0.005% Ophthalmic Solution Both EYES                              11.3   16.03 )-----------( 65       ( 08 May 2021 20:28 )             34.6       05-08    141  |  110<H>  |  40<H>  ----------------------------<  100<H>  5.2   |  18<L>  |  2.52<H>    Ca    7.3<L>      08 May 2021 20:28  Phos  4.2     05-08  Mg     1.6     -    TPro  5.6<L>  /  Alb  2.5<L>  /  TBili  3.5<H>  /  DBili  x   /  AST  129<H>  /  ALT  110<H>  /  AlkPhos  55  05-08    Lactate 2.1           05-08 @ 20:28    Lactate 2.8           05-08 @ 14:06    Lactate 3.9            @ 06:40          PT/INR - ( 08 May 2021 20:28 )   PT: 16.2 sec;   INR: 1.36 ratio         PTT - ( 08 May 2021 20:28 )  PTT:43.3 sec  Urinalysis Basic - ( 08 May 2021 16:46 )    Color: Yellow / Appearance: Clear / S.010 / pH: x  Gluc: x / Ketone: Negative  / Bili: Negative / Urobili: Negative mg/dL   Blood: x / Protein: 30 mg/dL / Nitrite: Negative   Leuk Esterase: Moderate / RBC: 3-5 /HPF / WBC 3-5   Sq Epi: x / Non Sq Epi: Occasional / Bacteria: Occasional      .Blood Blood-Peripheral   Growth in anaerobic bottle: Gram Positive Cocci in Pairs and Chains   Growth in anaerobic bottle: Gram Positive Cocci in Pairs and Chains  @ 13:22      Radiology:     < from: CT Pelvis No Cont (21 @ 18:00) >  EXAM:  CT PELVIS ONLY                          EXAM:  CT ABDOMEN ONLY                          EXAM:  CT CHEST                                  PROCEDURE DATE:  2021          INTERPRETATION:  CLINICAL INFORMATION: Septic shock with nausea andchills    COMPARISON: Same day chest x-ray, CT abdomen pelvis 2021    CONTRAST/COMPLICATIONS:  IV Contrast: NONE  0 cc administered   0 cc discarded  Oral Contrast: NONE  Complications: None reported at time of study completion    PROCEDURE:  CT of the Chest, Abdomen and Pelvis was performed.  Sagittal and coronal reformats were performed.    FINDINGS:  CHEST:  LUNGS AND LARGE AIRWAYS: The tip of the endotracheal tube is above the contreras. The central airways are patent. Pulmonary edema and bibasilar atelectasis.  PLEURA: Small bilateral effusions.  VESSELS: Normal caliber aorta. Right IJ line tip in SVC.  HEART: Normal heart size. No pericardial effusion. Coronary artery calcifications are present.  MEDIASTINUM AND KARLA: No adenopathy.  CHEST WALL AND LOWER NECK: No masses.    ABDOMEN AND PELVIS:  LIVER: Normal.  BILE DUCTS: Nondilated.  GALLBLADDER: Sludge and diffuse nonspecific wall edema.  SPLEEN: Normal.  PANCREAS: Normal.  ADRENALS: Normal.  KIDNEYS/URETERS: No hydronephrosis orurinary tract calculi.    BLADDER: Collapsed around a Tyler catheter balloon. Multiple stones within the right and left posterior diverticula.  REPRODUCTIVE ORGANS: Nonenlarged.    BOWEL: No bowel-related abnormality.  PERITONEUM: No free air or ascites. No collection.  VESSELS: Aortoiliac atherosclerosis without aneurysm.  RETROPERITONEUM/LYMPH NODES: No adenopathy.  ABDOMINAL WALL: Normal.  BONES: No aggressive lesion.    IMPRESSION:  *  Pulmonary edema and small bilateral pleural effusions.  * Multiple small stones layering within urinary bladder diverticula.      DAVON BOYD MD; Attending Radiologist  This document has been electronically signed. May  8 2021  6:23PM    < end of copied text >        CENTRAL LINE: Y  TYLER: Y  A-LINE: N    GLOBAL ISSUE/BEST PRACTICE:  Analgesia: Y  Sedation: Y  CAM-ICU: n/a  HOB elevation: Y  Stress ulcer prophylaxis: Y  VTE prophylaxis: Y  Glycemic control: Y  Nutrition: Y    CODE STATUS: FULL CODE    CRITICAL CARE TIME SPENT: 35 mins  (Assessing presenting problems of acute illness, which pose high probability of life threatening deterioration or end organ damage/dysfunction, as well as medical decision making including initiating plan of care, reviewing data, reviewing radiologic exams, discussing with multidisciplinary team,  discussing goals of care with patient/family, and writing this note.  Non-inclusive of procedures performed)

## 2021-05-09 NOTE — PROGRESS NOTE ADULT - SUBJECTIVE AND OBJECTIVE BOX
Subjective: Patient sedated and intubated. No overnight events.     MEDICATIONS  (STANDING):  ALBUTerol    90 MICROgram(s) HFA Inhaler 2 Puff(s) Inhalation every 8 hours  chlorhexidine 0.12% Liquid 15 milliLiter(s) Oral Mucosa two times a day  chlorhexidine 2% Cloths 1 Application(s) Topical daily  dorzolamide 2%/timolol 0.5% Ophthalmic Solution 1 Drop(s) Both EYES two times a day  hydrocortisone sodium succinate Injectable 100 milliGRAM(s) IV Push every 8 hours  latanoprost 0.005% Ophthalmic Solution 1 Drop(s) Both EYES at bedtime  meropenem  IVPB 1000 milliGRAM(s) IV Intermittent every 8 hours  midodrine 10 milliGRAM(s) Oral every 8 hours  norepinephrine Infusion 0.05 MICROgram(s)/kG/Min (3.56 mL/Hr) IV Continuous <Continuous>  propofol Infusion 10 MICROgram(s)/kG/Min (4.56 mL/Hr) IV Continuous <Continuous>    MEDICATIONS  (PRN):  acetaminophen   Tablet .. 650 milliGRAM(s) Oral every 6 hours PRN Temp greater or equal to 38C (100.4F), Mild Pain (1 - 3)      Allergies    No Known Allergies    Intolerances        Vital Signs Last 24 Hrs  T(C): 37.2 (09 May 2021 03:30), Max: 37.7 (08 May 2021 12:45)  T(F): 99 (09 May 2021 03:30), Max: 99.8 (08 May 2021 12:45)  HR: 84 (09 May 2021 09:00) (83 - 112)  BP: 108/71 (09 May 2021 09:00) (71/50 - 155/90)  BP(mean): 83 (09 May 2021 09:00) (58 - 110)  RR: 16 (09 May 2021 09:00) (11 - 34)  SpO2: 99% (09 May 2021 09:00) (95% - 100%)    PHYSICAL EXAM:  GENERAL: NAD, well-groomed, well-developed  HEAD:  Atraumatic, Normocephalic  ENMT: Moist mucous membranes,   NECK: Supple, No JVD, Normal thyroid  NERVOUS SYSTEM:  All 4 extremities mobile, no gross sensory deficits.   CHEST/LUNG: Clear to auscultation bilaterally; No rales, rhonchi, wheezing, or rubs  HEART: Regular rate and rhythm; No murmurs, rubs, or gallops  ABDOMEN: Soft, Nontender, Nondistended; Bowel sounds present  EXTREMITIES:  2+ Peripheral Pulses, No clubbing, cyanosis, or edema      LABS:                        11.6   20.59 )-----------( 59       ( 09 May 2021 06:38 )             35.2     08 May 2021 20:28    141    |  110    |  40     ----------------------------<  100    5.2     |  18     |  2.52     Ca    7.3        08 May 2021 20:28  Phos  4.5       09 May 2021 06:38  Mg     1.9       09 May 2021 06:38    TPro  5.6    /  Alb  2.5    /  TBili  3.5    /  DBili  x      /  AST  129    /  ALT  110    /  AlkPhos  55     08 May 2021 20:28    PT/INR - ( 08 May 2021 20:28 )   PT: 16.2 sec;   INR: 1.36 ratio         PTT - ( 08 May 2021 20:28 )  PTT:43.3 sec  Urinalysis Basic - ( 08 May 2021 16:46 )    Color: Yellow / Appearance: Clear / S.010 / pH: x  Gluc: x / Ketone: Negative  / Bili: Negative / Urobili: Negative mg/dL   Blood: x / Protein: 30 mg/dL / Nitrite: Negative   Leuk Esterase: Moderate / RBC: 3-5 /HPF / WBC 3-5   Sq Epi: x / Non Sq Epi: Occasional / Bacteria: Occasional      CAPILLARY BLOOD GLUCOSE      POCT Blood Glucose.: 113 mg/dL (09 May 2021 05:33)  POCT Blood Glucose.: 81 mg/dL (09 May 2021 01:41)      RADIOLOGY & ADDITIONAL TESTS:    Imaging Personally Reviewed:  [ ] YES     Consultant(s) Notes Reviewed:      Care Discussed with Consultants/Other Providers:    Advanced Directives: [ ] DNR  [ ] No feeding tube  [ ] MOLST in chart  [ ] MOLST completed today  [ ] Unknown

## 2021-05-09 NOTE — PROGRESS NOTE ADULT - SUBJECTIVE AND OBJECTIVE BOX
NEPHROLOGY PROGRESS NOTE    CHIEF COMPLAINT:  HORACIO    HPI:  Remains intubated with less pressor requirement.  Urine output excellent.  Renal function stabilizing with transient hyperkalemia.  Blood is growing a gram positive pairs/chains.  Oxygenating well.      EXAM:  T(F): 99 (21 @ 03:30)  HR: 89 (21 @ 11:00)  BP: 146/81 (21 @ 11:00)  RR: 16 (21 @ 11:00)  SpO2: 100% (21 @ 11:00)    Normal respiratory effort, lungs clear bilaterally  Heart RRR with no murmur, no peripheral edema         LABS                             11.6   20.59 )-----------( 59       ( 09 May 2021 06:38 )             35.2              144  |  112<H>  |  38<H>  ----------------------------<  119<H>  5.0   |  18<L>  |  2.27<H>    Ca    7.4<L>      09 May 2021 10:30  Phos  4.5       Mg     1.9         TPro  5.6<L>  /  Alb  2.5<L>  /  TBili  3.5<H>  /  DBili  x   /  AST  129<H>  /  ALT  110<H>  /  AlkPhos  55      Urinalysis Basic - ( 08 May 2021 16:46 )  Color: Yellow / Appearance: Clear / S.010 / pH: x  Gluc: x / Ketone: Negative  / Bili: Negative / Urobili: Negative mg/dL   Blood: x / Protein: 30 mg/dL / Nitrite: Negative   Leuk Esterase: Moderate / RBC: 3-5 /HPF / WBC 3-5   Sq Epi: x / Non Sq Epi: Occasional / Bacteria: Occasional    Urine culture no growth    RADIOLOGY  Chest X-ray personally reviewed and shows pronounced markings    CT scan shows pulmonary edema, small pleural effusions, small stones layering in bladder diverticula    ABG 7.97--18    proBNP > 35,000      ASSESSMENT:  1.  HORACIO due to septic ATN, non oliguric, small improvement  2.  Gram positive (enterococcal?) bacteremia - source not clear  3.  Lactic acidosis, improving    PLAN:  Continue supportive care with antibiotics, pressors, hemodynamic monitoring  Will defer volume management to critical care;  patient can be diuresed if necessary  Monitor BMP every 12 hours  If potassium trends higher please switch to low K tube feed formula  Do not anticipate dialytic requirement in near term

## 2021-05-09 NOTE — PROGRESS NOTE ADULT - ASSESSMENT
82 y/o M w/ pmh htn, hld and bph presented to [facility] on 05/06/2021 for hematuria and weakness, was admitted to medicine for sepsis likely 2/2 to UTI pt was started on IVAB, pt developed hypotension on 05/07/201 and upgraded to SPCU for close observation on 05/09/2021 pt started to developed worsening septic shock and was intubated for airway protection and started on pressors.    -Neuro: Intubated and sedated cont prop gtt and versed PRN for vent synchrony  -Cardiac: Hypotension likely 2/2 to septic shock +/- distributive shock cont levophed to maintain MAP >65, start midodrine 10mg q8h to víctor off pressors  -Resp: Acute Hypoxic resp failure likely 2/2 to pulm edema cont lung protective ventilation, plan for AM ABG and possible weaning trails given minimal vent setting if shock state has improved will cont to monitor closely overnight  -GI: OGT placed will start TF, GI ppx w/ protonix   -Renal: HORACIO likely in the setting of septic shock no obvious signs of obstruction on CT monitor I&O closely, HyperK improving cont to monitor, Lactic acidosis improving, avoid nephrotoxic drugs  -ID: Septic shock likely 2/2 to +Bacteriemia pending final cx cont IV merro f/u on cx data  -Endo: no acute issues, monitor FS q6h   -Heme: DVT ppx w/ SCD currently off AC 2/2 to hematuria, thrombocytopenia likely 2/2 to acute illness  -Dispo: Pt remains in SPCU currently full code, GOC ongoing by day team, dispo pending hospital course

## 2021-05-09 NOTE — PROGRESS NOTE ADULT - SUBJECTIVE AND OBJECTIVE BOX
Date/Time Patient Seen:  		  Referring MD:   Data Reviewed	       Patient is a 81y old  Male who presents with a chief complaint of hematuria, nausea, weakness (09 May 2021 00:19)      Subjective/HPI     PAST MEDICAL & SURGICAL HISTORY:  Hypertension    Hyperlipidemia    BPH (benign prostatic hyperplasia)    History of prostate surgery          Medication list         MEDICATIONS  (STANDING):  chlorhexidine 0.12% Liquid 15 milliLiter(s) Oral Mucosa two times a day  chlorhexidine 2% Cloths 1 Application(s) Topical daily  dexMEDEtomidine Infusion 0.05 MICROgram(s)/kG/Hr (0.95 mL/Hr) IV Continuous <Continuous>  dorzolamide 2%/timolol 0.5% Ophthalmic Solution 1 Drop(s) Both EYES two times a day  hydrocortisone sodium succinate Injectable 100 milliGRAM(s) IV Push every 8 hours  latanoprost 0.005% Ophthalmic Solution 1 Drop(s) Both EYES at bedtime  meropenem  IVPB 1000 milliGRAM(s) IV Intermittent every 8 hours  midodrine 10 milliGRAM(s) Oral every 8 hours  norepinephrine Infusion 0.05 MICROgram(s)/kG/Min (3.56 mL/Hr) IV Continuous <Continuous>  propofol Infusion 10 MICROgram(s)/kG/Min (4.56 mL/Hr) IV Continuous <Continuous>    MEDICATIONS  (PRN):  acetaminophen   Tablet .. 650 milliGRAM(s) Oral every 6 hours PRN Temp greater or equal to 38C (100.4F), Mild Pain (1 - 3)         Vitals log        ICU Vital Signs Last 24 Hrs  T(C): 37.2 (09 May 2021 03:30), Max: 38.6 (08 May 2021 09:03)  T(F): 99 (09 May 2021 03:30), Max: 101.4 (08 May 2021 09:03)  HR: 100 (09 May 2021 06:30) (83 - 116)  BP: 151/84 (09 May 2021 06:30) (63/51 - 155/90)  BP(mean): 102 (09 May 2021 06:30) (52 - 110)  ABP: --  ABP(mean): --  RR: 19 (09 May 2021 06:30) (0 - 34)  SpO2: 99% (09 May 2021 06:30) (81% - 100%)       Mode: PS (Pressure Support)/ Spontaneous  FiO2: 40  PEEP: 5  PS: 10  MAP: 10  PIP: 17      Input and Output:  I&O's Detail    07 May 2021 07:01  -  08 May 2021 07:00  --------------------------------------------------------  IN:    IV PiggyBack: 200 mL    Lactated Ringers: 2200 mL    Lactated Ringers Bolus: 1250 mL    Norepinephrine: 64.5 mL    Oral Fluid: 100 mL    Sodium Chloride 0.9% Bolus: 1000 mL  Total IN: 4814.5 mL    OUT:    Voided (mL): 1000 mL  Total OUT: 1000 mL    Total NET: 3814.5 mL      08 May 2021 07:01  -  09 May 2021 06:35  --------------------------------------------------------  IN:    Dexmedetomidine: 1 mL    Enteral Tube Flush: 125 mL    IV PiggyBack: 350 mL    Jevity 1.5: 50 mL    Norepinephrine: 672 mL    Propofol: 123.4 mL    Sodium Bicarbonate: 450 mL  Total IN: 1771.4 mL    OUT:    Indwelling Catheter - Urethral (mL): 2650 mL  Total OUT: 2650 mL    Total NET: -878.6 mL          Lab Data                        11.3   16.03 )-----------( 65       ( 08 May 2021 20:28 )             34.6     05-08    141  |  110<H>  |  40<H>  ----------------------------<  100<H>  5.2   |  18<L>  |  2.52<H>    Ca    7.3<L>      08 May 2021 20:28  Phos  4.2     05-08  Mg     1.6     05-08    TPro  5.6<L>  /  Alb  2.5<L>  /  TBili  3.5<H>  /  DBili  x   /  AST  129<H>  /  ALT  110<H>  /  AlkPhos  55  05-08    ABG - ( 09 May 2021 06:20 )  pH, Arterial: 7.31  pH, Blood: x     /  pCO2: 34    /  pO2: 120   / HCO3: 18    / Base Excess: x     /  SaO2: 98                      Review of Systems	      Objective     Physical Examination    heart s1s2  lung dec bs  abd soft  head nc      Pertinent Lab findings & Imaging      Christopher:  NO   Adequate UO     I&O's Detail    07 May 2021 07:01  -  08 May 2021 07:00  --------------------------------------------------------  IN:    IV PiggyBack: 200 mL    Lactated Ringers: 2200 mL    Lactated Ringers Bolus: 1250 mL    Norepinephrine: 64.5 mL    Oral Fluid: 100 mL    Sodium Chloride 0.9% Bolus: 1000 mL  Total IN: 4814.5 mL    OUT:    Voided (mL): 1000 mL  Total OUT: 1000 mL    Total NET: 3814.5 mL      08 May 2021 07:01  -  09 May 2021 06:35  --------------------------------------------------------  IN:    Dexmedetomidine: 1 mL    Enteral Tube Flush: 125 mL    IV PiggyBack: 350 mL    Jevity 1.5: 50 mL    Norepinephrine: 672 mL    Propofol: 123.4 mL    Sodium Bicarbonate: 450 mL  Total IN: 1771.4 mL    OUT:    Indwelling Catheter - Urethral (mL): 2650 mL  Total OUT: 2650 mL    Total NET: -878.6 mL               Discussed with:     Cultures:	        Radiology

## 2021-05-09 NOTE — DIETITIAN INITIAL EVALUATION ADULT. - OTHER INFO
Per H&P, pt is a 81M with HTN, BPH who presents with hematuria and weakness.  Patient had a cystoscopy about 2 weeks ago (for evaluation for chronic bacteriuria) and since then has had intermittent hematuria.  Was given 3 days of an abx post-procedure.  Then today (Thursday), patient noted gross blood from his urine.  Patient also started to have chills, fevers, and nausea.  CT renal showed possible cystitis, multiple bladder diverticula with likely layering calcifications within the posterior diverticula and along the posterior bladder wall, fullness in left renal pelvis.  Patient is being admitted for sepsis 2/2 UTI/ infection.    Pt s/p intubation (5/8), noted CPAP trial attempted today.  Per RN, plan is to possibly extubate patient tomorrow/as medically feasible.  NPO status maintained.  OGT feedings initiated for nutrition.  Order active for Jevity 1.5 to goal 40ml/hr x 24hrs (to provide 1440kcal, 61g protein).  If patient unable to be extubated within next ~24hrs, recommend increase goal rate to 55ml/hr x 24hrs (to provide 1980kcal based on 26kcal/kg CBW, 90g protein based on 1.1g/kg IBW).  RD to monitor need for renal formula (K+, phos WNL 5/9).  Pt's niece/HCP called to obtain nutrition related hx.  Pt lives at home with spouse, niece endorses pt is a good eater at home, usually consumes standard 3x meals per day; follows low sodium diet PTA.  NKFA.  Per niece, pt has own teeth, no chewing/swallowing difficulties reported.  Wt generally stable PTA (~167#).  Skin intact.  RD to follow-up and will continue to monitor pt's nutritional status.

## 2021-05-09 NOTE — PROGRESS NOTE ADULT - ASSESSMENT
81M with HTN, BPH who presents with hematuria and weakness.  Patient had a cystoscopy about 2 weeks ago (for evaluation for chronic bacteriuria) and since then has had intermittent hematuria.  Was given 3 days of an abx post-procedure.  Then today (Thursday), patient noted gross blood from his urine.  Patient also started to have chills, fevers, and nausea.  No pain.  No recent sick contacts.   COVID vaccine with Pf[employer] was done >1 month ago.  No other complaints.  Brought here for further evaluation.  In the ED, patient's triage VS were /65  HR 97  RR 24, 95%, T 101.2F.  Physical exam revealed that he was becoming altered (did not know where he was).  Labs were significant for leukopenia at WBC 0.78, Cr 1.34, lactate 2.3, and UA with +nitrite, mod LE with 0-2 WBC, large blood with >50 RBC, occasional bacteria.  Was started on ceftriaxone empirically.  CT renal showed possible cystitis, multiple bladder diverticula with likely layering calcifications within the posterior diverticula and along the posterior bladder wall, fullness in left renal pelvis.  Patient also had a head CT for AMS.  However, niece said that the patient was more lucid after the fluids and is no longer altered and responding accordingly.  Patient is being admitted on 5/7/21 for sepsis 2/2 UTI/ infection.    Hematology was consulted by Dr Zayas for thrombocytopenia while i was rounding at [place] on 5/8/21 and i saw patient in few minutes consult was requested on 5/8/21, this am patient became hypotensive, was given iv fluids, placed on levophed, patient in septic shock possible and is on antibiotics as per id, patient was placed on bipap as per pulmonary and for possible intubation--final decision as per pulmonary/critical care  septic shock possible  hypotension  resp distress--pul following, on bipap at time of consult--possible intubation--final decision per critical care/pull  renal failure and worsening renal failure  thrombocytopenia--hem was consulted  anemia    RECOMMENDATION:  anemia--likely multifactorial  iron panel--low iron/saturation  no evidence of hemolysis  nl b12/folate  hb is at 11.6 today, was at 11.3 yesterday  thrombocytopenia--likely multifactorial, more likely from sepsis/ increased consumption  follow work up  platelets at 59 k today, was at 65 k yesterday  monitor serial platelets  smear reviewed  no splenomegaly on earlier abd us  monitor cbc  urine retention/uti/cystitis possible--s/p [place]--clear urine--urology is following, h/o recent s/p cystoscopy  renal failure--nephrology is following  septic shock, seen by id and is on antibiotics as per id  resp failure--on vent and vent management per pul  gi/dvtp--scd  d/w pt family--niece regan  at bedside.

## 2021-05-09 NOTE — PROGRESS NOTE ADULT - ASSESSMENT
ABG shows improvement - ID f/u noted - remains on DI -  and Renal Eval noted - CT imaging repeat noted -     81M with HTN, BPH who presents with hematuria and weakness.  ventilated - ABG noted - HOB elev - asp prec -fio2 titration SBT trials as tolerated  Sepsis - Shock - eval in progress - serial Lactic acid - s/p IVF - pressors - I and O - monitor VS and HD and Sat  CKD - HORACIO - s/p IVF - Pressors - Renal Eval  keep MAP > 60, Midodrine 10 mg PO TID - Pressors IV as needed   Serial labs - replete lytes - monitor biomarkers  ID eval noted - pt is on broad ABX  - cx in progress - adjusted to Meropenem - ID follow up -   I jeremy - mucus impaction - mucus plugging on imaging - Albuterol PRN - Chest PT -   BPH rx regimen  US abd - CT renal stone hunt - CXR - noted  SPCU monitoring - care and supportive measures

## 2021-05-10 LAB
% ALBUMIN: 55.3 % — SIGNIFICANT CHANGE UP
% ALPHA 1: 6.5 % — SIGNIFICANT CHANGE UP
% ALPHA 2: 8.5 % — SIGNIFICANT CHANGE UP
% BETA: 12.3 % — SIGNIFICANT CHANGE UP
% GAMMA: 17.4 % — SIGNIFICANT CHANGE UP
-  CEFTRIAXONE: SIGNIFICANT CHANGE UP
-  CLINDAMYCIN: SIGNIFICANT CHANGE UP
-  ERYTHROMYCIN: SIGNIFICANT CHANGE UP
-  LEVOFLOXACIN: SIGNIFICANT CHANGE UP
-  PENICILLIN: SIGNIFICANT CHANGE UP
-  VANCOMYCIN: SIGNIFICANT CHANGE UP
ALBUMIN SERPL ELPH-MCNC: 3.1 G/DL — LOW (ref 3.6–5.5)
ALBUMIN/GLOB SERPL ELPH: 1.2 RATIO — SIGNIFICANT CHANGE UP
ALPHA1 GLOB SERPL ELPH-MCNC: 0.4 G/DL — SIGNIFICANT CHANGE UP (ref 0.1–0.4)
ALPHA2 GLOB SERPL ELPH-MCNC: 0.5 G/DL — SIGNIFICANT CHANGE UP (ref 0.5–1)
ANION GAP SERPL CALC-SCNC: 5 MMOL/L — SIGNIFICANT CHANGE UP (ref 5–17)
B-GLOBULIN SERPL ELPH-MCNC: 0.7 G/DL — SIGNIFICANT CHANGE UP (ref 0.5–1)
BASE EXCESS BLDA CALC-SCNC: -3 MMOL/L — LOW (ref -2–2)
BASOPHILS # BLD AUTO: 0.1 K/UL — SIGNIFICANT CHANGE UP (ref 0–0.2)
BASOPHILS NFR BLD AUTO: 0.5 % — SIGNIFICANT CHANGE UP (ref 0–2)
BLOOD GAS COMMENTS ARTERIAL: SIGNIFICANT CHANGE UP
BUN SERPL-MCNC: 37 MG/DL — HIGH (ref 7–23)
CALCIUM SERPL-MCNC: 7.9 MG/DL — LOW (ref 8.4–10.5)
CHLORIDE SERPL-SCNC: 114 MMOL/L — HIGH (ref 96–108)
CO2 SERPL-SCNC: 25 MMOL/L — SIGNIFICANT CHANGE UP (ref 22–31)
CREAT SERPL-MCNC: 1.59 MG/DL — HIGH (ref 0.5–1.3)
CULTURE RESULTS: SIGNIFICANT CHANGE UP
EOSINOPHIL # BLD AUTO: 0 K/UL — SIGNIFICANT CHANGE UP (ref 0–0.5)
EOSINOPHIL NFR BLD AUTO: 0 % — SIGNIFICANT CHANGE UP (ref 0–6)
GAMMA GLOBULIN: 1 G/DL — SIGNIFICANT CHANGE UP (ref 0.6–1.6)
GLUCOSE SERPL-MCNC: 144 MG/DL — HIGH (ref 70–99)
HCO3 BLDA-SCNC: 22 MMOL/L — LOW (ref 23–27)
HCT VFR BLD CALC: 32.3 % — LOW (ref 39–50)
HGB BLD-MCNC: 10.6 G/DL — LOW (ref 13–17)
HOROWITZ INDEX BLDA+IHG-RTO: 35 — SIGNIFICANT CHANGE UP
IMM GRANULOCYTES NFR BLD AUTO: 4.1 % — HIGH (ref 0–1.5)
LACTATE SERPL-SCNC: 1.4 MMOL/L — SIGNIFICANT CHANGE UP (ref 0.7–2)
LYMPHOCYTES # BLD AUTO: 0.86 K/UL — LOW (ref 1–3.3)
LYMPHOCYTES # BLD AUTO: 4.2 % — LOW (ref 13–44)
MAGNESIUM SERPL-MCNC: 2.3 MG/DL — SIGNIFICANT CHANGE UP (ref 1.6–2.6)
MCHC RBC-ENTMCNC: 29.7 PG — SIGNIFICANT CHANGE UP (ref 27–34)
MCHC RBC-ENTMCNC: 32.8 GM/DL — SIGNIFICANT CHANGE UP (ref 32–36)
MCV RBC AUTO: 90.5 FL — SIGNIFICANT CHANGE UP (ref 80–100)
METHOD TYPE: SIGNIFICANT CHANGE UP
METHOD TYPE: SIGNIFICANT CHANGE UP
MONOCYTES # BLD AUTO: 1.35 K/UL — HIGH (ref 0–0.9)
MONOCYTES NFR BLD AUTO: 6.6 % — SIGNIFICANT CHANGE UP (ref 2–14)
NEUTROPHILS # BLD AUTO: 17.44 K/UL — HIGH (ref 1.8–7.4)
NEUTROPHILS NFR BLD AUTO: 84.6 % — HIGH (ref 43–77)
NRBC # BLD: 0 /100 WBCS — SIGNIFICANT CHANGE UP (ref 0–0)
NT-PROBNP SERPL-SCNC: HIGH PG/ML (ref 0–450)
ORGANISM # SPEC MICROSCOPIC CNT: SIGNIFICANT CHANGE UP
PCO2 BLDA: 38 MMHG — SIGNIFICANT CHANGE UP (ref 32–46)
PH BLDA: 7.37 — SIGNIFICANT CHANGE UP (ref 7.35–7.45)
PHOSPHATE SERPL-MCNC: 2.4 MG/DL — LOW (ref 2.5–4.5)
PLATELET # BLD AUTO: 48 K/UL — LOW (ref 150–400)
PO2 BLDA: 104 MMHG — SIGNIFICANT CHANGE UP (ref 74–108)
POTASSIUM SERPL-MCNC: 4.6 MMOL/L — SIGNIFICANT CHANGE UP (ref 3.5–5.3)
POTASSIUM SERPL-SCNC: 4.6 MMOL/L — SIGNIFICANT CHANGE UP (ref 3.5–5.3)
PROT PATTERN SERPL ELPH-IMP: SIGNIFICANT CHANGE UP
PROT SERPL-MCNC: 5.6 G/DL — LOW (ref 6–8.3)
RBC # BLD: 3.57 M/UL — LOW (ref 4.2–5.8)
RBC # FLD: 13.8 % — SIGNIFICANT CHANGE UP (ref 10.3–14.5)
SAO2 % BLDA: 97 % — HIGH (ref 92–96)
SARS-COV-2 RNA SPEC QL NAA+PROBE: SIGNIFICANT CHANGE UP
SODIUM SERPL-SCNC: 144 MMOL/L — SIGNIFICANT CHANGE UP (ref 135–145)
SPECIMEN SOURCE: SIGNIFICANT CHANGE UP
WBC # BLD: 20.59 K/UL — HIGH (ref 3.8–10.5)
WBC # FLD AUTO: 20.59 K/UL — HIGH (ref 3.8–10.5)

## 2021-05-10 PROCEDURE — 99233 SBSQ HOSP IP/OBS HIGH 50: CPT

## 2021-05-10 RX ORDER — HYDROCORTISONE 20 MG
50 TABLET ORAL EVERY 8 HOURS
Refills: 0 | Status: DISCONTINUED | OUTPATIENT
Start: 2021-05-10 | End: 2021-05-11

## 2021-05-10 RX ORDER — DEXMEDETOMIDINE HYDROCHLORIDE IN 0.9% SODIUM CHLORIDE 4 UG/ML
0.05 INJECTION INTRAVENOUS
Qty: 200 | Refills: 0 | Status: DISCONTINUED | OUTPATIENT
Start: 2021-05-10 | End: 2021-05-10

## 2021-05-10 RX ORDER — IRON SUCROSE 20 MG/ML
100 INJECTION, SOLUTION INTRAVENOUS EVERY 24 HOURS
Refills: 0 | Status: COMPLETED | OUTPATIENT
Start: 2021-05-10 | End: 2021-05-12

## 2021-05-10 RX ORDER — DEXMEDETOMIDINE HYDROCHLORIDE IN 0.9% SODIUM CHLORIDE 4 UG/ML
1 INJECTION INTRAVENOUS
Qty: 200 | Refills: 0 | Status: DISCONTINUED | OUTPATIENT
Start: 2021-05-10 | End: 2021-05-10

## 2021-05-10 RX ADMIN — PROPOFOL 4.56 MICROGRAM(S)/KG/MIN: 10 INJECTION, EMULSION INTRAVENOUS at 05:22

## 2021-05-10 RX ADMIN — PIPERACILLIN AND TAZOBACTAM 25 GRAM(S): 4; .5 INJECTION, POWDER, LYOPHILIZED, FOR SOLUTION INTRAVENOUS at 13:08

## 2021-05-10 RX ADMIN — MIDODRINE HYDROCHLORIDE 10 MILLIGRAM(S): 2.5 TABLET ORAL at 21:55

## 2021-05-10 RX ADMIN — DORZOLAMIDE HYDROCHLORIDE TIMOLOL MALEATE 1 DROP(S): 20; 5 SOLUTION/ DROPS OPHTHALMIC at 17:21

## 2021-05-10 RX ADMIN — MIDODRINE HYDROCHLORIDE 10 MILLIGRAM(S): 2.5 TABLET ORAL at 13:19

## 2021-05-10 RX ADMIN — PIPERACILLIN AND TAZOBACTAM 25 GRAM(S): 4; .5 INJECTION, POWDER, LYOPHILIZED, FOR SOLUTION INTRAVENOUS at 21:54

## 2021-05-10 RX ADMIN — ALBUTEROL 2 PUFF(S): 90 AEROSOL, METERED ORAL at 14:44

## 2021-05-10 RX ADMIN — MIDODRINE HYDROCHLORIDE 10 MILLIGRAM(S): 2.5 TABLET ORAL at 05:23

## 2021-05-10 RX ADMIN — LATANOPROST 1 DROP(S): 0.05 SOLUTION/ DROPS OPHTHALMIC; TOPICAL at 21:55

## 2021-05-10 RX ADMIN — DEXMEDETOMIDINE HYDROCHLORIDE IN 0.9% SODIUM CHLORIDE 19 MICROGRAM(S)/KG/HR: 4 INJECTION INTRAVENOUS at 06:48

## 2021-05-10 RX ADMIN — PANTOPRAZOLE SODIUM 40 MILLIGRAM(S): 20 TABLET, DELAYED RELEASE ORAL at 11:25

## 2021-05-10 RX ADMIN — PIPERACILLIN AND TAZOBACTAM 25 GRAM(S): 4; .5 INJECTION, POWDER, LYOPHILIZED, FOR SOLUTION INTRAVENOUS at 05:23

## 2021-05-10 RX ADMIN — ALBUTEROL 2 PUFF(S): 90 AEROSOL, METERED ORAL at 06:47

## 2021-05-10 RX ADMIN — Medication 50 MILLIGRAM(S): at 21:54

## 2021-05-10 RX ADMIN — DEXMEDETOMIDINE HYDROCHLORIDE IN 0.9% SODIUM CHLORIDE 0.95 MICROGRAM(S)/KG/HR: 4 INJECTION INTRAVENOUS at 06:32

## 2021-05-10 RX ADMIN — Medication 50 MILLIGRAM(S): at 13:08

## 2021-05-10 RX ADMIN — DORZOLAMIDE HYDROCHLORIDE TIMOLOL MALEATE 1 DROP(S): 20; 5 SOLUTION/ DROPS OPHTHALMIC at 05:26

## 2021-05-10 RX ADMIN — ALBUTEROL 2 PUFF(S): 90 AEROSOL, METERED ORAL at 23:00

## 2021-05-10 RX ADMIN — CHLORHEXIDINE GLUCONATE 15 MILLILITER(S): 213 SOLUTION TOPICAL at 00:49

## 2021-05-10 RX ADMIN — CHLORHEXIDINE GLUCONATE 15 MILLILITER(S): 213 SOLUTION TOPICAL at 23:41

## 2021-05-10 RX ADMIN — CHLORHEXIDINE GLUCONATE 1 APPLICATION(S): 213 SOLUTION TOPICAL at 11:25

## 2021-05-10 RX ADMIN — IRON SUCROSE 100 MILLIGRAM(S): 20 INJECTION, SOLUTION INTRAVENOUS at 17:51

## 2021-05-10 RX ADMIN — Medication 100 MILLIGRAM(S): at 05:22

## 2021-05-10 RX ADMIN — CHLORHEXIDINE GLUCONATE 15 MILLILITER(S): 213 SOLUTION TOPICAL at 11:25

## 2021-05-10 NOTE — CONSULT NOTE ADULT - SUBJECTIVE AND OBJECTIVE BOX
History of Present Illness: The patient is an 81 year old male with a history of HTN, HL, BPH who was admitted on 5/6 with hematuria, fever, and chills in the setting of urosepsis. He developed septic shock and was intubated for respiratory failure. He was found to have strep bacteremia. He is currently intubated and plan is for extubation.    Past Medical/Surgical History:  HTN, HL, BPH    Medications:  Home Medications:  aspirin 81 mg oral tablet: 81 milligram(s) orally once a day (07 May 2021 00:48)  atenolol: 20 milligram(s) orally once a day (07 May 2021 00:48)  Flomax 0.4 mg oral capsule: 1 cap(s) orally once a day (07 May 2021 00:48)  Lipitor 20 mg oral tablet: 1 tab(s) orally once a day (07 May 2021 00:48)      Family History: Non-contributory family history of premature cardiovascular atherosclerotic disease    Social History: No tobacco, alcohol or drug use    Review of Systems:  General: No fevers, chills, weight loss or gain  Skin: No rashes, color changes  Cardiovascular: No chest pain, orthopnea  Respiratory: No shortness of breath, cough  Gastrointestinal: No nausea, abdominal pain  Genitourinary: No incontinence, pain with urination  Musculoskeletal: No pain, swelling, decreased range of motion  Neurological: No headache, weakness  Psychiatric: No depression, anxiety  Endocrine: No weight loss or gain, increased thirst  All other systems are comprehensively negative.    Physical Exam:  Vitals:        Vital Signs Last 24 Hrs  T(C): 36.6 (10 May 2021 07:37), Max: 37.9 (09 May 2021 16:30)  T(F): 97.8 (10 May 2021 07:37), Max: 100.2 (09 May 2021 16:30)  HR: 59 (10 May 2021 08:30) (59 - 89)  BP: 86/52 (10 May 2021 08:30) (78/54 - 146/81)  BP(mean): 63 (10 May 2021 08:30) (62 - 100)  RR: 14 (10 May 2021 08:30) (12 - 23)  SpO2: 97% (10 May 2021 08:30) (95% - 100%)  General: Intubated  HEENT: Sedated  Neck: No JVD, no carotid bruit  Lungs: CTAB  CV: RRR, nl S1/S2, no M/R/G  Abdomen: S/NT/ND, +BS  Extremities: No LE edema, no cyanosis  Neuro: Non-focal  Skin: No rash    Labs:                        10.6   20.59 )-----------( 48       ( 10 May 2021 06:11 )             32.3     05-10    144  |  114<H>  |  37<H>  ----------------------------<  144<H>  4.6   |  25  |  1.59<H>    Ca    7.9<L>      10 May 2021 06:11  Phos  2.4     05-10  Mg     2.3     05-10    TPro  5.6<L>  /  Alb  2.5<L>  /  TBili  3.5<H>  /  DBili  x   /  AST  129<H>  /  ALT  110<H>  /  AlkPhos  55  05-08        PT/INR - ( 08 May 2021 20:28 )   PT: 16.2 sec;   INR: 1.36 ratio         PTT - ( 08 May 2021 20:28 )  PTT:43.3 sec    ECG: Sinus tachycardia, normal axis, poor R wave progression, low voltage

## 2021-05-10 NOTE — PROGRESS NOTE ADULT - SUBJECTIVE AND OBJECTIVE BOX
Patient is a 81y old  Male who presents with a chief complaint of hematuria, nausea, weakness (10 May 2021 09:39)       Pt is seen and examined  pt is awake and lying in bed/out of bed to chair  pt seems comfortable and denies any complaints at this time    HPI:  81M with HTN, BPH who presents with hematuria and weakness.  Patient had a cystoscopy about 2 weeks ago (for evaluation for chronic bacteriuria) and since then has had intermittent hematuria.  Was given 3 days of an abx post-procedure.  Then today (Thursday), patient noted gross blood from his urine.  Patient also started to have chills, fevers, and nausea.  No pain.  No recent sick contacts.   COVID vaccine with Pfizer was done >1 month ago.  No other complaints.  Brought here for further evaluation.  In the ED, patient's triage VS were /65  HR 97  RR 24, 95%, T 101.2F.  Physical exam revealed that he was becoming altered (did not know where he was).  Labs were significant for leukopenia at WBC 0.78, Cr 1.34, lactate 2.3, and UA with +nitrite, mod LE with 0-2 WBC, large blood with >50 RBC, occasional bacteria.  Was started on ceftriaxone empirically.  CT renal showed possible cystitis, multiple bladder diverticula with likely layering calcifications within the posterior diverticula and along the posterior bladder wall, fullness in left renal pelvis.  Patient also had a head CT for AMS.  However, niece said that the patient was more lucid after the fluids and is no longer altered and responding accordingly.  Patient is being admitted for sepsis 2/2 UTI/ infection.   (07 May 2021 01:13)         ROS:  Negative except for:    MEDICATIONS  (STANDING):  ALBUTerol    90 MICROgram(s) HFA Inhaler 2 Puff(s) Inhalation every 8 hours  chlorhexidine 0.12% Liquid 15 milliLiter(s) Oral Mucosa two times a day  chlorhexidine 2% Cloths 1 Application(s) Topical daily  dorzolamide 2%/timolol 0.5% Ophthalmic Solution 1 Drop(s) Both EYES two times a day  hydrocortisone sodium succinate Injectable 50 milliGRAM(s) IV Push every 8 hours  latanoprost 0.005% Ophthalmic Solution 1 Drop(s) Both EYES at bedtime  midodrine 10 milliGRAM(s) Oral every 8 hours  pantoprazole  Injectable 40 milliGRAM(s) IV Push daily  piperacillin/tazobactam IVPB.. 3.375 Gram(s) IV Intermittent every 8 hours    MEDICATIONS  (PRN):  acetaminophen   Tablet .. 650 milliGRAM(s) Oral every 6 hours PRN Temp greater or equal to 38C (100.4F), Mild Pain (1 - 3)      Allergies    No Known Allergies    Intolerances        Vital Signs Last 24 Hrs  T(C): 36.4 (10 May 2021 13:21), Max: 37.9 (09 May 2021 16:30)  T(F): 97.6 (10 May 2021 13:21), Max: 100.2 (09 May 2021 16:30)  HR: 70 (10 May 2021 13:30) (56 - 88)  BP: 122/70 (10 May 2021 13:30) (78/54 - 134/80)  BP(mean): 87 (10 May 2021 13:30) (62 - 97)  RR: 15 (10 May 2021 13:30) (11 - 39)  SpO2: 100% (10 May 2021 13:30) (95% - 100%)    PHYSICAL EXAM  General: adult in NAD  HEENT: clear oropharynx, anicteric sclera, pink conjunctiva  Neck: supple  CV: normal S1/S2 with no murmur rubs or gallops  Lungs: positive air movement b/l ant lungs,clear to auscultation, no wheezes, no rales  Abdomen: soft non-tender non-distended, no hepatosplenomegaly  Ext: no clubbing cyanosis or edema  Skin: no rashes and no petechiae  Neuro: alert and oriented X 4, no focal deficits  LABS:                          10.6   20.59 )-----------( 48       ( 10 May 2021 06:11 )             32.3         Mean Cell Volume : 90.5 fl  Mean Cell Hemoglobin : 29.7 pg  Mean Cell Hemoglobin Concentration : 32.8 gm/dL  Auto Neutrophil # : 17.44 K/uL  Auto Lymphocyte # : 0.86 K/uL  Auto Monocyte # : 1.35 K/uL  Auto Eosinophil # : 0.00 K/uL  Auto Basophil # : 0.10 K/uL  Auto Neutrophil % : 84.6 %  Auto Lymphocyte % : 4.2 %  Auto Monocyte % : 6.6 %  Auto Eosinophil % : 0.0 %  Auto Basophil % : 0.5 %    Serial CBC's  05-10 @ 06:11  Hct-32.3 / Hgb-10.6 / Plat-48 / RBC-3.57 / WBC-20.59          Serial CBC's  05-09 @ 06:38  Hct-35.2 / Hgb-11.6 / Plat-59 / RBC-3.92 / WBC-20.59          Serial CBC's  05-08 @ 20:28  Hct-34.6 / Hgb-11.3 / Plat-65 / RBC-3.80 / WBC-16.03          Serial CBC's  05-08 @ 14:06  Hct-32.5 / Hgb-10.7 / Plat-66 / RBC-3.59 / WBC-14.05          Serial CBC's  05-08 @ 06:40  Hct-33.0 / Hgb-10.8 / Plat-65 / RBC-3.62 / WBC-11.90            05-10    144  |  114<H>  |  37<H>  ----------------------------<  144<H>  4.6   |  25  |  1.59<H>    Ca    7.9<L>      10 May 2021 06:11  Phos  2.4     05-10  Mg     2.3     05-10    TPro  5.6<L>  /  Alb  2.5<L>  /  TBili  3.5<H>  /  DBili  x   /  AST  129<H>  /  ALT  110<H>  /  AlkPhos  55  05-08      PT/INR - ( 08 May 2021 20:28 )   PT: 16.2 sec;   INR: 1.36 ratio         PTT - ( 08 May 2021 20:28 )  PTT:43.3 sec    Ferritin, Serum: 374 ng/mL (05-09-21 @ 00:08)  Vitamin B12, Serum: 1096 pg/mL (05-09-21 @ 00:08)  Folate, Serum: 8.7 ng/mL (05-09-21 @ 00:08)  Iron - Total Binding Capacity.: 187 ug/dL (05-08-21 @ 22:33)  Reticulocyte Percent: 1.3 % (05-08-21 @ 14:06)                BLOOD SMEAR INTERPRETATION:       RADIOLOGY & ADDITIONAL STUDIES:     Patient is a 81y old  Male who presents with a chief complaint of hematuria, nausea, weakness (10 May 2021 09:39)       Pt is seen and examined  pt is awake and lying in bed  s/p extubation 5/10/21-today  pt seems comfortable and denies any complaints at this time  patient niece is at bedside, patient eating his lunch ( with help from his niece)    HPI:  81M with HTN, BPH who presents with hematuria and weakness.  Patient had a cystoscopy about 2 weeks ago (for evaluation for chronic bacteriuria) and since then has had intermittent hematuria.  Was given 3 days of an abx post-procedure.  Then today (Thursday), patient noted gross blood from his urine.  Patient also started to have chills, fevers, and nausea.  No pain.  No recent sick contacts.   COVID vaccine with Pfizer was done >1 month ago.  No other complaints.  Brought here for further evaluation.  In the ED, patient's triage VS were /65  HR 97  RR 24, 95%, T 101.2F.  Physical exam revealed that he was becoming altered (did not know where he was).  Labs were significant for leukopenia at WBC 0.78, Cr 1.34, lactate 2.3, and UA with +nitrite, mod LE with 0-2 WBC, large blood with >50 RBC, occasional bacteria.  Was started on ceftriaxone empirically.  CT renal showed possible cystitis, multiple bladder diverticula with likely layering calcifications within the posterior diverticula and along the posterior bladder wall, fullness in left renal pelvis.  Patient also had a head CT for AMS.  However, niece said that the patient was more lucid after the fluids and is no longer altered and responding accordingly.  Patient is being admitted for sepsis 2/2 UTI/ infection.   (07 May 2021 01:13)         ROS:  as per hpi    MEDICATIONS  (STANDING):  ALBUTerol    90 MICROgram(s) HFA Inhaler 2 Puff(s) Inhalation every 8 hours  chlorhexidine 0.12% Liquid 15 milliLiter(s) Oral Mucosa two times a day  chlorhexidine 2% Cloths 1 Application(s) Topical daily  dorzolamide 2%/timolol 0.5% Ophthalmic Solution 1 Drop(s) Both EYES two times a day  hydrocortisone sodium succinate Injectable 50 milliGRAM(s) IV Push every 8 hours  latanoprost 0.005% Ophthalmic Solution 1 Drop(s) Both EYES at bedtime  midodrine 10 milliGRAM(s) Oral every 8 hours  pantoprazole  Injectable 40 milliGRAM(s) IV Push daily  piperacillin/tazobactam IVPB.. 3.375 Gram(s) IV Intermittent every 8 hours    MEDICATIONS  (PRN):  acetaminophen   Tablet .. 650 milliGRAM(s) Oral every 6 hours PRN Temp greater or equal to 38C (100.4F), Mild Pain (1 - 3)      Allergies    No Known Allergies    Intolerances        Vital Signs Last 24 Hrs  T(C): 36.4 (10 May 2021 13:21), Max: 37.9 (09 May 2021 16:30)  T(F): 97.6 (10 May 2021 13:21), Max: 100.2 (09 May 2021 16:30)  HR: 70 (10 May 2021 13:30) (56 - 88)  BP: 122/70 (10 May 2021 13:30) (78/54 - 134/80)  BP(mean): 87 (10 May 2021 13:30) (62 - 97)  RR: 15 (10 May 2021 13:30) (11 - 39)  SpO2: 100% (10 May 2021 13:30) (95% - 100%)    PHYSICAL EXAM  General: adult in NAD  HEENT: clear oropharynx, anicteric sclera, pink conjunctiva  Neck: supple  CV: normal S1/S2 with no murmur rubs or gallops  Lungs: positive air movement b/l ant lungs,clear to auscultation, no wheezes, no rales  Abdomen: soft non-tender non-distended, no hepatosplenomegaly  Ext: no clubbing cyanosis or edema  Skin: no rashes and no petechiae  Neuro: alert and oriented X 4, no focal deficits  LABS:                          10.6   20.59 )-----------( 48       ( 10 May 2021 06:11 )             32.3         Mean Cell Volume : 90.5 fl  Mean Cell Hemoglobin : 29.7 pg  Mean Cell Hemoglobin Concentration : 32.8 gm/dL  Auto Neutrophil # : 17.44 K/uL  Auto Lymphocyte # : 0.86 K/uL  Auto Monocyte # : 1.35 K/uL  Auto Eosinophil # : 0.00 K/uL  Auto Basophil # : 0.10 K/uL  Auto Neutrophil % : 84.6 %  Auto Lymphocyte % : 4.2 %  Auto Monocyte % : 6.6 %  Auto Eosinophil % : 0.0 %  Auto Basophil % : 0.5 %    Serial CBC's  05-10 @ 06:11  Hct-32.3 / Hgb-10.6 / Plat-48 / RBC-3.57 / WBC-20.59          Serial CBC's  05-09 @ 06:38  Hct-35.2 / Hgb-11.6 / Plat-59 / RBC-3.92 / WBC-20.59          Serial CBC's  05-08 @ 20:28  Hct-34.6 / Hgb-11.3 / Plat-65 / RBC-3.80 / WBC-16.03          Serial CBC's  05-08 @ 14:06  Hct-32.5 / Hgb-10.7 / Plat-66 / RBC-3.59 / WBC-14.05          Serial CBC's  05-08 @ 06:40  Hct-33.0 / Hgb-10.8 / Plat-65 / RBC-3.62 / WBC-11.90            05-10    144  |  114<H>  |  37<H>  ----------------------------<  144<H>  4.6   |  25  |  1.59<H>    Ca    7.9<L>      10 May 2021 06:11  Phos  2.4     05-10  Mg     2.3     05-10    TPro  5.6<L>  /  Alb  2.5<L>  /  TBili  3.5<H>  /  DBili  x   /  AST  129<H>  /  ALT  110<H>  /  AlkPhos  55  05-08      PT/INR - ( 08 May 2021 20:28 )   PT: 16.2 sec;   INR: 1.36 ratio         PTT - ( 08 May 2021 20:28 )  PTT:43.3 sec    Ferritin, Serum: 374 ng/mL (05-09-21 @ 00:08)  Vitamin B12, Serum: 1096 pg/mL (05-09-21 @ 00:08)  Folate, Serum: 8.7 ng/mL (05-09-21 @ 00:08)  Iron - Total Binding Capacity.: 187 ug/dL (05-08-21 @ 22:33)  Reticulocyte Percent: 1.3 % (05-08-21 @ 14:06)

## 2021-05-10 NOTE — PROGRESS NOTE ADULT - SUBJECTIVE AND OBJECTIVE BOX
Subjective: Patient seen and examined. Extubated earlier today. Pressors off.  Still lethargic.     MEDICATIONS  (STANDING):  ALBUTerol    90 MICROgram(s) HFA Inhaler 2 Puff(s) Inhalation every 8 hours  chlorhexidine 0.12% Liquid 15 milliLiter(s) Oral Mucosa two times a day  chlorhexidine 2% Cloths 1 Application(s) Topical daily  dorzolamide 2%/timolol 0.5% Ophthalmic Solution 1 Drop(s) Both EYES two times a day  hydrocortisone sodium succinate Injectable 50 milliGRAM(s) IV Push every 8 hours  latanoprost 0.005% Ophthalmic Solution 1 Drop(s) Both EYES at bedtime  midodrine 10 milliGRAM(s) Oral every 8 hours  pantoprazole  Injectable 40 milliGRAM(s) IV Push daily  piperacillin/tazobactam IVPB.. 3.375 Gram(s) IV Intermittent every 8 hours    MEDICATIONS  (PRN):  acetaminophen   Tablet .. 650 milliGRAM(s) Oral every 6 hours PRN Temp greater or equal to 38C (100.4F), Mild Pain (1 - 3)      Allergies    No Known Allergies    Intolerances        Vital Signs Last 24 Hrs  T(C): 36.6 (10 May 2021 07:37), Max: 37.9 (09 May 2021 16:30)  T(F): 97.8 (10 May 2021 07:37), Max: 100.2 (09 May 2021 16:30)  HR: 59 (10 May 2021 08:30) (59 - 89)  BP: 86/52 (10 May 2021 08:30) (78/54 - 146/81)  BP(mean): 63 (10 May 2021 08:30) (62 - 100)  RR: 14 (10 May 2021 08:30) (12 - 23)  SpO2: 97% (10 May 2021 08:30) (95% - 100%)    PHYSICAL EXAM:  GENERAL: NAD, well-groomed, well-developed  HEAD:  Atraumatic, Normocephalic  ENMT: Moist mucous membranes,   NECK: Supple, No JVD, Normal thyroid  NERVOUS SYSTEM:  All 4 extremities mobile, no gross sensory deficits.   CHEST/LUNG: Clear to auscultation bilaterally; No rales, rhonchi, wheezing, or rubs  HEART: Regular rate and rhythm; No murmurs, rubs, or gallops  ABDOMEN: Soft, Nontender, Nondistended; Bowel sounds present  EXTREMITIES:  2+ Peripheral Pulses, No clubbing, cyanosis, or edema      LABS:                        10.6   20.59 )-----------( 48       ( 10 May 2021 06:11 )             32.3     10 May 2021 06:11    144    |  114    |  37     ----------------------------<  144    4.6     |  25     |  1.59     Ca    7.9        10 May 2021 06:11  Phos  2.4       10 May 2021 06:11  Mg     2.3       10 May 2021 06:11      PT/INR - ( 08 May 2021 20:28 )   PT: 16.2 sec;   INR: 1.36 ratio         PTT - ( 08 May 2021 20:28 )  PTT:43.3 sec  Urinalysis Basic - ( 08 May 2021 16:46 )    Color: Yellow / Appearance: Clear / S.010 / pH: x  Gluc: x / Ketone: Negative  / Bili: Negative / Urobili: Negative mg/dL   Blood: x / Protein: 30 mg/dL / Nitrite: Negative   Leuk Esterase: Moderate / RBC: 3-5 /HPF / WBC 3-5   Sq Epi: x / Non Sq Epi: Occasional / Bacteria: Occasional      CAPILLARY BLOOD GLUCOSE      POCT Blood Glucose.: 172 mg/dL (10 May 2021 00:06)  POCT Blood Glucose.: 154 mg/dL (09 May 2021 17:53)      RADIOLOGY & ADDITIONAL TESTS:    Imaging Personally Reviewed:  [ ] YES     Consultant(s) Notes Reviewed:      Care Discussed with Consultants/Other Providers:    Advanced Directives: [ ] DNR  [ ] No feeding tube  [ ] MOLST in chart  [ ] MOLST completed today  [ ] Unknown

## 2021-05-10 NOTE — PROGRESS NOTE ADULT - ASSESSMENT
82 y/o M w/ pmh htn, hld and bph presented to [facility] on 05/06/2021 for hematuria and weakness, was admitted to medicine for sepsis likely 2/2 to UTI pt was started on IVAB, pt developed hypotension on 05/07/201 and upgraded to SPCU for close observation on 05/09/2021 pt started to developed worsening septic shock and was intubated for airway protection and started on pressors.    -Neuro: Intubated and sedated cont prop for vent synchrony  -Cardiac: Hypotension likely 2/2 to septic shock +/- distributive shock now improving and levophed requirement down trending cont to titrate to maintain MAP >65, cont midodrine 10mg q8h to víctor off pressors  -Resp: Acute Hypoxic resp failure likely 2/2 to pulm edema cont lung protective ventilation, PS trials ongoing back on full support overnight will place patient back on PS trial in the AM  -GI: OGT placed cont TF as ordered/tolerated, GI ppx w/ protonix   -Renal: HORACIO likely in the setting of septic shock now improving, monitor I&O closely, avoid nephrotoxic drugs  -ID: Septic shock likely 2/2 to +Bacteriemia pending final cx cont IVAB changed from merro----->zosyn by ID, f/u on cx data  -Endo: no acute issues, monitor FS q6h   -Heme: DVT ppx w/ SCD currently off AC 2/2 to hematuria, thrombocytopenia likely 2/2 to acute illness, Heme recs noted  -Dispo: Pt remains in SPCU currently full code, GOC ongoing by day team, dispo pending hospital course

## 2021-05-10 NOTE — PROGRESS NOTE ADULT - ASSESSMENT
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     Septic Shock / Strep Bacteremia   IV Zosyn. Given one dose IV Vancomcyin in the setting of Renal Failure (Cultures showing Gram+ chains and pairs)  BMP still pending; CBC reviewed  Infection does not seem to be  source given negative Urine Culture. Initially thought to be the case due to recent instrumentation in the setting of chornic bacteruria.  Repeat CT Imaging reviewed. Spoke to Cardiology and if second set of cultures positive may need ERCIK to rule out Endo  CC: Dr. Zayas; ID: Dr. Hernandez   Cardiology will be consulted     Hypoxic Respiratory Failure   Due to Sepsis and Volume Overload; BNP Elevated   Echo reviewed with no signs of failure;   Imaging does not show any obvious pulm infection  Extubated 5/10/21    Acute Renal Failure   In the setting of Sepsis; Improving  Renally dose meds and monitor BMP and electrolytes     Metabolic Encephalopathy   Improving  Due to Sepsis       Thrombocytopenia  In the setting of Sepsis  Avoid LMWH  Hematology (Dr. Madera)    HTN  Hold anti-HTN meds    BPH  Hold Flomax    Hypothyroidism  Synthroid Daily    Diet  Regular    DVT Prophylaxis  SCD    Disposition  Full Code/Inpatient  Discharge planning pending hospital course   CC Time spent >60 minutes        81M HTN, BPH and Chronic Bacteriuria who had cystoscopy last week admitted for Septic Shock from  infection.     Septic Shock / Strep Bacteremia   IV Zosyn. Given one dose IV Vancomcyin in the setting of Renal Failure (Cultures showing Gram+ chains and pairs)  BMP still pending; CBC reviewed  Infection does not seem to be  source given negative Urine Culture. Initially thought to be the case due to recent instrumentation in the setting of chornic bacteruria.  Repeat CT Imaging reviewed. Spoke to Cardiology and if second set of cultures positive may need ERICK to rule out Endo  CC: Dr. Zayas; ID: Dr. Hernandez   Cardiology will be consulted     Hypoxic Respiratory Failure   Due to Sepsis and Volume Overload; BNP Elevated   Echo reviewed with no signs of failure;   Imaging does not show any obvious pulm infection  Extubated 5/10/21    Acute Renal Failure   In the setting of Sepsis; Improving  Renally dose meds and monitor BMP and electrolytes     Metabolic Encephalopathy   Improving  Due to Sepsis       Thrombocytopenia  In the setting of Sepsis  Avoid LMWH  Hematology (Dr. Madera)    HTN  Hold anti-HTN meds    BPH  Hold Flomax    Diet  Regular    DVT Prophylaxis  SCD    Disposition  Full Code/Inpatient  Discharge planning pending hospital course   CC Time spent >60 minutes        81M HTN, BPH and Chronic Bacteriuria who had cystoscopy last week admitted for Septic Shock from  infection.     Septic Shock / Strep Bacteremia   IV Zosyn. Given one dose IV Vancomcyin in the setting of Renal Failure (Cultures showing Gram+ chains and pairs)  BMP still pending; CBC reviewed  Infection does not seem to be  source given negative Urine Culture. Initially thought to be the case due to recent instrumentation in the setting of chornic bacteruria.  Repeat CT Imaging reviewed. Spoke to Cardiology and if second set of cultures positive may need ERICK to rule out Endo  Extubated 5/10/21; Off Pressors and on Midodrine now   CC: Dr. Zayas; ID: Dr. Hernandez   Cardiology will be consulted     Hypoxic Respiratory Failure   Due to Sepsis and Volume Overload; BNP Elevated   Echo reviewed with no signs of failure;   Imaging does not show any obvious pulm infection  Extubated 5/10/21    Acute Renal Failure   In the setting of Sepsis; Improving  Renally dose meds and monitor BMP and electrolytes     Metabolic Encephalopathy   Improving  Due to Sepsis       Thrombocytopenia  In the setting of Sepsis  Avoid LMWH  Hematology (Dr. Madera)    HTN  Hold anti-HTN meds    BPH  Hold Flomax    Diet  Regular    DVT Prophylaxis  SCD    Disposition  Full Code/Inpatient  Discharge planning pending hospital course   CC Time spent >60 minutes

## 2021-05-10 NOTE — PROGRESS NOTE ADULT - ASSESSMENT
HORACIO: ATN  s/p Resp failure  Sepsis, Shock Strep bacteremia    Improving renal indices. To continue current meds. Avoid nephrotoxic meds as possible. Low potassium diet.   Will follow electrolytes and renal function trend. D/w family at bedside.

## 2021-05-10 NOTE — PROGRESS NOTE ADULT - ASSESSMENT
81M with HTN, BPH who presents with hematuria and weakness.  Patient had a cystoscopy about 2 weeks ago (for evaluation for chronic bacteriuria) and since then has had intermittent hematuria.  Was given 3 days of an abx post-procedure.  Then today (Thursday), patient noted gross blood from his urine.  Patient also started to have chills, fevers, and nausea.  No pain.  No recent sick contacts.   COVID vaccine with Pf[employer] was done >1 month ago.  No other complaints.  Brought here for further evaluation.  In the ED, patient's triage VS were /65  HR 97  RR 24, 95%, T 101.2F.  Physical exam revealed that he was becoming altered (did not know where he was).  Labs were significant for leukopenia at WBC 0.78, Cr 1.34, lactate 2.3, and UA with +nitrite, mod LE with 0-2 WBC, large blood with >50 RBC, occasional bacteria.  Was started on ceftriaxone empirically.  CT renal showed possible cystitis, multiple bladder diverticula with likely layering calcifications within the posterior diverticula and along the posterior bladder wall, fullness in left renal pelvis.  Patient also had a head CT for AMS.  However, niece said that the patient was more lucid after the fluids and is no longer altered and responding accordingly.  Patient is being admitted on 5/7/21 for sepsis 2/2 UTI/ infection.    Hematology was consulted by Dr Zayas for thrombocytopenia while i was rounding at [facility] on 5/8/21 and i saw patient in few minutes consult was requested on 5/8/21, this am patient became hypotensive, was given iv fluids, placed on levophed, patient in septic shock possible and is on antibiotics as per id, patient was placed on bipap as per pulmonary and for possible intubation--final decision as per pulmonary/critical care  septic shock possible  hypotension  resp distress--pul following, on bipap at time of consult--possible intubation--final decision per critical care/pull  renal failure and worsening renal failure  thrombocytopenia--hem was consulted  anemia    RECOMMENDATION:  Anemia--likely multifactorial--renal/dilutional/nutritional  iron panel--low iron/saturation, ferritin 374  creatinine 2.8 (5/8)----1.59 (5/10)  no evidence of hemolysis  nl b12/folate  hb is at 10.6 today, was at 11.6 yesterday  as patient with low iron/transferrin saturation and looking for a rapid response, pt with renal insufficiency and possible poor po iron absorption with renal insufficiency will give iv iron trial, monitor serial h/h  Thrombocytopenia--likely multifactorial, more likely from sepsis/ increased consumption  nl b12/folate  nl fibrinogen  platelets at 48 k today, was at 59 k yesterday  monitor serial platelets  smear reviewed  no splenomegaly on earlier abd us  urine retention/uti/cystitis possible--s/p arrington--clear urine--urology is following, h/o recent s/p cystoscopy  renal failure--nephrology is following, creatinine slow improving  septic shock, seen by id and is on antibiotics as per id  s/p resp failure--s/p intubation-- s/p extubation, follow pulmonary  gi/dvtp--scd  d/w pt family--niece regan  at bedside.

## 2021-05-10 NOTE — PROGRESS NOTE ADULT - SUBJECTIVE AND OBJECTIVE BOX
HPI: 80 y/o M w/ pmh htn, hld and bph presented to [facility] on 2021 for hematuria and weakness, was admitted to medicine for sepsis likely 2/2 to UTI pt was started on IVAB, pt developed hypotension on  and upgraded to SPCU for close observation on 2021 pt started to developed worsening septic shock and was intubated for airway protection and started on pressors.    24 hour events: Day time events reviewed pt did well on PS trials but deemed unready for extubation and placed back on full support, no other major day time events noted.     Review of Systems: Unable to obtain ROS pt intubated and sedated    T(F): 99 (21 @ 20:00), Max: 100.2 (21 @ 16:30)  HR: 83 (21 @ 23:30) (77 - 102)  BP: 119/68 (21 @ 23:30) (95/63 - 155/90)  RR: 18 (21 @ 23:30) (15 - 27)  SpO2: 98% (21 @ 23:30) (96% - 100%)  Wt(kg): --    Mode: AC/ CMV (Assist Control/ Continuous Mandatory Ventilation), RR (machine): 16, TV (machine): 500, FiO2: 40, PEEP: 5  21 @ 07:01  -  21 @ 07:00  --------------------------------------------------------  IN: 1830.4 mL / OUT: 2650 mL / NET: -819.6 mL    21 @ 07:01  -  05-10-21 @ 00:01  --------------------------------------------------------  IN: 1117.6 mL / OUT: 900 mL / NET: 217.6 mL        CAPILLARY BLOOD GLUCOSE      POCT Blood Glucose.: 154 mg/dL (09 May 2021 17:53)      I&O's Summary    08 May 2021 07:01  -  09 May 2021 07:00  --------------------------------------------------------  IN: 1830.4 mL / OUT: 2650 mL / NET: -819.6 mL    09 May 2021 07:01  -  10 May 2021 00:01  --------------------------------------------------------  IN: 1117.6 mL / OUT: 900 mL / NET: 217.6 mL        Physical Exam:   Gen: Comfortable in bed in NAD  Neuro: intubated and sedated  Resp: good air entry b/l  CVS: +RRR  Abd: BSx4, soft, nt/nd  Ext: no edema   Skin: warm/dry      Meds:  piperacillin/tazobactam IVPB.. IV Intermittent    midodrine Oral  norepinephrine Infusion IV Continuous    hydrocortisone sodium succinate Injectable IV Push    ALBUTerol    90 MICROgram(s) HFA Inhaler Inhalation    acetaminophen   Tablet .. Oral PRN  propofol Infusion IV Continuous        pantoprazole  Injectable IV Push          chlorhexidine 0.12% Liquid Oral Mucosa  chlorhexidine 2% Cloths Topical  dorzolamide 2%/timolol 0.5% Ophthalmic Solution Both EYES  latanoprost 0.005% Ophthalmic Solution Both EYES                              11.6   20.59 )-----------( 59       ( 09 May 2021 06:38 )             35.2       05-09    144  |  112<H>  |  38<H>  ----------------------------<  119<H>  5.0   |  18<L>  |  2.27<H>    Ca    7.4<L>      09 May 2021 10:30  Phos  4.5     05-  Mg     1.9     05    TPro  5.6<L>  /  Alb  2.5<L>  /  TBili  3.5<H>  /  DBili  x   /  AST  129<H>  /  ALT  110<H>  /  AlkPhos  55            PT/INR - ( 08 May 2021 20:28 )   PT: 16.2 sec;   INR: 1.36 ratio         PTT - ( 08 May 2021 20:28 )  PTT:43.3 sec  Urinalysis Basic - ( 08 May 2021 16:46 )    Color: Yellow / Appearance: Clear / S.010 / pH: x  Gluc: x / Ketone: Negative  / Bili: Negative / Urobili: Negative mg/dL   Blood: x / Protein: 30 mg/dL / Nitrite: Negative   Leuk Esterase: Moderate / RBC: 3-5 /HPF / WBC 3-5   Sq Epi: x / Non Sq Epi: Occasional / Bacteria: Occasional      .Sputum Sputum --   Rare polymorphonuclear leukocytes per low power field  Few Squamous epithelial cells per low power field  No organisms seen per oil power field  @ 20:53  .Blood Blood-Peripheral   No growth to date. --  @ 22:09  .Urine Catheterized   No growth --  @ 22:00  .Blood Blood-Peripheral   Growth in anaerobic bottle: Streptococcus anginosus  See previous culture [id number]   Growth in anaerobic bottle: Gram Positive Cocci in Pairs and Chains  @ 13:22      Radiology:     < from: Xray Chest 1 View-PORTABLE IMMEDIATE (Xray Chest 1 View-PORTABLE IMMEDIATE .) (21 @ 20:17) >  EXAM:  XR CHEST PORTABLE IMMED 1V                                  PROCEDURE DATE:  2021          INTERPRETATION:  A single chest x-ray was obtained on May 8, 2021.    INDICATION: OG tube placement.    IMPRESSION:    The heart is normal in size. The lungs appear to be clear. No pleural effusion. No pneumothorax. Endotracheal tube is at the level of the clavicle. NG tube is in the stomach however its tip is not seen on the current study. A central line is seen on the right and the tip is in the superior vena cava.                CUATE PATRICIA MD; Attending Radiologist  This document has been electronically signed. May  9 2021  9:44AM    < end of copied text >      CENTRAL LINE: Y  TYLER: Y  A-LINE: N    GLOBAL ISSUE/BEST PRACTICE:  Analgesia: Y  Sedation: Y  CAM-ICU: n/a  HOB elevation: Y  Stress ulcer prophylaxis: Y  VTE prophylaxis: Y  Glycemic control: Y  Nutrition: Y    CODE STATUS: FULL CODE    CRITICAL CARE TIME SPENT: 35 mins  (Assessing presenting problems of acute illness, which pose high probability of life threatening deterioration or end organ damage/dysfunction, as well as medical decision making including initiating plan of care, reviewing data, reviewing radiologic exams, discussing with multidisciplinary team,  discussing goals of care with patient/family, and writing this note.  Non-inclusive of procedures performed)

## 2021-05-10 NOTE — PROGRESS NOTE ADULT - SUBJECTIVE AND OBJECTIVE BOX
ANN MARIE NAVARRETE is a 81yMale , patient examined and chart reviewed.     INTERVAL HPI/ OVERNIGHT EVENTS:  Extubated this am. Weak.    Past Medical History--  PAST MEDICAL & SURGICAL HISTORY:  Hypertension  Hyperlipidemia  BPH (benign prostatic hyperplasia)  History of prostate surgery      For details regarding the patient's social history, family history, and other miscellaneous elements, please refer the initial infectious diseases consultation and/or the admitting history and physical examination for this admission.      ROS:  Unable to obtain due to : pt's condition      Current inpatient medications :    ANTIBIOTICS/RELEVANT:  piperacillin/tazobactam IVPB.. 3.375 Gram(s) IV Intermittent every 8 hours      MEDICATIONS  (STANDING):  ALBUTerol    90 MICROgram(s) HFA Inhaler 2 Puff(s) Inhalation every 8 hours  chlorhexidine 0.12% Liquid 15 milliLiter(s) Oral Mucosa two times a day  chlorhexidine 2% Cloths 1 Application(s) Topical daily  dorzolamide 2%/timolol 0.5% Ophthalmic Solution 1 Drop(s) Both EYES two times a day  hydrocortisone sodium succinate Injectable 50 milliGRAM(s) IV Push every 8 hours  iron sucrose Injectable 100 milliGRAM(s) IV Push every 24 hours  latanoprost 0.005% Ophthalmic Solution 1 Drop(s) Both EYES at bedtime  midodrine 10 milliGRAM(s) Oral every 8 hours  pantoprazole  Injectable 40 milliGRAM(s) IV Push daily    MEDICATIONS  (PRN):  acetaminophen   Tablet .. 650 milliGRAM(s) Oral every 6 hours PRN Temp greater or equal to 38C (100.4F), Mild Pain (1 - 3)      Objective:  ICU Vital Signs Last 24 Hrs  T(C): 36.8 (10 May 2021 16:14), Max: 37.5 (10 May 2021 00:05)  T(F): 98.2 (10 May 2021 16:14), Max: 99.5 (10 May 2021 00:05)  HR: 71 (10 May 2021 14:45) (56 - 88)  BP: 137/80 (10 May 2021 14:00) (78/54 - 137/80)  BP(mean): 97 (10 May 2021 14:00) (62 - 97)  RR: 19 (10 May 2021 14:00) (11 - 39)  SpO2: 100% (10 May 2021 14:45) (95% - 100%)      Physical Exam:  GEN: weak  HEENT: normocephalic and atraumatic.   NECK: Supple.   LUNGS: Decreased to auscultation.  HEART: Regular rate and rhythm without murmur.  ABDOMEN: Soft, nontender, and nondistended.  Positive bowel sounds.    EXTREMITIES: +edema.  NEUROLOGIC: lethargic      LABS:                        10.6   20.59 )-----------( 48       ( 10 May 2021 06:11 )             32.3   05-10    144  |  114<H>  |  37<H>  ----------------------------<  144<H>  4.6   |  25  |  1.59<H>    Ca    7.9<L>      10 May 2021 06:11  Phos  2.4     05-10  Mg     2.3     05-10    TPro  x   /  Alb  3.1<L>  /  TBili  x   /  DBili  x   /  AST  x   /  ALT  x   /  AlkPhos  x   05-08    Urinalysis Basic - ( 06 May 2021 22:26 )    Color: Sabiha / Appearance: Turbid / S.015 / pH: x  Gluc: x / Ketone: Negative  / Bili: Negative / Urobili: Negative mg/dL   Blood: x / Protein: 100 mg/dL / Nitrite: Positive   Leuk Esterase: Moderate / RBC: >50 /HPF / WBC 0-2   Sq Epi: x / Non Sq Epi: Occasional / Bacteria: Occasional      ABG - ( 08 May 2021 09:09 )  pH, Arterial: 7.22  pH, Blood: x     /  pCO2: 38    /  pO2: 72    / HCO3: 15    / Base Excess: -11.2 /  SaO2: 92         Urinalysis Basic - ( 06 May 2021 22:26 )    Color: Sabiha / Appearance: Turbid / S.015 / pH: x  Gluc: x / Ketone: Negative  / Bili: Negative / Urobili: Negative mg/dL   Blood: x / Protein: 100 mg/dL / Nitrite: Positive   Leuk Esterase: Moderate / RBC: >50 /HPF / WBC 0-2   Sq Epi: x / Non Sq Epi: Occasional / Bacteria: Occasional    MICROBIOLOGY:    Culture - Sputum (collected 09 May 2021 20:53)  Source: .Sputum Sputum  Gram Stain (09 May 2021 22:52):    Rare polymorphonuclear leukocytes per low power field    Few Squamous epithelial cells per low power field    No organisms seen per oil power field  Preliminary Report (10 May 2021 16:43):    Normal Respiratory Mckinley present    Culture - Blood (collected 08 May 2021 22:09)  Source: .Blood Blood-Venous  Preliminary Report (09 May 2021 23:01):    No growth to date.    Culture - Blood (collected 08 May 2021 22:09)  Source: .Blood Blood-Peripheral  Preliminary Report (09 May 2021 23:01):    No growth to date.    Culture - Urine (collected 08 May 2021 22:00)  Source: .Urine Catheterized  Final Report (09 May 2021 17:11):    No growth    Culture - Urine (collected 07 May 2021 13:22)  Source: .Urine Clean Catch (Midstream)  Final Report (08 May 2021 23:17):    Normal Urogenital mckinley present    Culture - Blood (collected 07 May 2021 13:22)  Source: .Blood Blood-Peripheral  Gram Stain (09 May 2021 09:40):    Growth in aerobic bottle: Gram Positive Cocci in Pairs and Chains    Growth in anaerobic bottle: Gram positive cocci in pairs  Preliminary Report (09 May 2021 09:40):    Growth in aerobic bottle: Gram Positive Cocci in Pairs and Chains    Growth in anaerobic bottle: Gram positive cocci in pairs    ***Blood Panel PCR results on this specimen are available    approximately 3 hours after the Gram stain result.***    Gram stain, PCR, and/or culture results may not always    correspond due to difference in methodologies.    ************************************************************    This PCR assay was performed by multiplex PCR. This    Assay tests for 66 bacterial and resistance gene targets.    Please refer to the [employer] test directory    at [email or web address] for details.  Organism: Blood Culture PCR (08 May 2021 18:03)  Organism: Blood Culture PCR (08 May 2021 18:03)      -  Streptococcus anginosus group: Detec      Method Type: PCR    Culture - Blood (collected 07 May 2021 13:22)  Source: .Blood Blood-Peripheral  Gram Stain (08 May 2021 22:10):    Growth in anaerobic bottle: Gram Positive Cocci in Pairs and Chains  Preliminary Report (08 May 2021 22:10):    Growth in anaerobic bottle: Gram Positive Cocci in Pairs and Chains      RADIOLOGY & ADDITIONAL STUDIES:    EXAM:  XR CHEST PORTABLE URGENT 1V                          EXAM:  XR CHEST PORTABLE ROUTINE 1V                                  PROCEDURE DATE:  2021          INTERPRETATION:  Portable chest radiograph    CLINICAL INFORMATION: Sepsis    TECHNIQUE:  Portable  AP view of the chest was obtained.    COMPARISON: 3/2/2020 available for review.    FINDINGS:    The lungs show mild perihilar and RIGHT greater than LEFT basilar diffuse airspace disease with bronchial wall thickening  concerning for bronchiectasis. No large airspace consolidation or effusion.  No pneumothorax.    There is mild cardiomegaly.    Healed RIGHT mid clavicle fracture deformity.      IMPRESSION:   Bilateral mild diffuse airspace disease with bronchial wall thickening....    FOLLOW-UP AP PORTABLE CHEST RADIOGRAPH 2021 5:26 PM:  No interval change. Bilateral diffuse infiltrates with bronchial wall thickening.        Assessment :  EXAM:  CT RENAL STONE HUNT                                  PROCEDURE DATE:  2021          INTERPRETATION:  CLINICAL INFORMATION: Flank pain. BIB family for fever and hematuria, had cystoscopy a couple of weeks ago, was on abx for 3d after, had occ sm amt of hematuria after, tonight about 2 hr PTA had yudelka hematuria and chills, has also had n/v. Evaluate for kidney stone.    COMPARISON: None.    CONTRAST/COMPLICATIONS:  IV Contrast: None  Oral Contrast: None  Complications: None    TECHNIQUE: CT scan of the abdomen and pelvis was performed from the domes of the diaphragm to the symphysis pubis without oral or intravenous contrast. Coronal and sagittal reformatted images are provided.    FINDINGS:  There is motion artifact present which degrades image quality and limits evaluation.    The heart is not enlarged. There is no pericardial effusion. Mucoid impacted distal airways left lung base.    Evaluation of the parenchymal organs and vascular structures is limited without intravenous contrast.    Allowing for motion no intrarenal or ureteral calculus is identified. There is fullness of the left renal pelvis without significant perinephric stranding. No significant perinephric stranding or hydronephrosis on the right. The unenhanced appearance of the liver, gallbladder, pancreas and spleen are unremarkable aside from gallbladder sludge.    The small and large bowel are normal in caliber without evidence of obstruction. The appendix is normal. There is no colonic wall thickening or pericolonic inflammatory change suggested.    The abdominal aorta is normal in caliber. There is no retroperitoneal, pelvic or inguinal adenopathy.    There is circumferential bladder wall thickening for the degree of distention and some stranding in the perivesicular fat. There are multiple bladder diverticula, the posterior ones containing layering high density material likely layering calcifications. The largest is on the right measuring 6.0 x 5.7 cm. The prostate gland is upper limits normal in size at 5 cm transaxial dimension.    Degenerative changes are seen throughout the lumbar spine. There are no acute osseous abnormalities.    IMPRESSION:  Motion degraded exam. No evidence of nephrolithiasis or obstructive uropathy. Bladder wall thickening with perivesicular fat stranding suggesting cystitis. Multiple bladder diverticula with likely layering calcifications within the posterior diverticula and along the posterior bladder wall. Fullness left renal pelvis. Please correlate clinically with urinalysis and urine culture.      Assessment :   81M with HTN, BPH recent cystoscopy about 2 weeks ago (for evaluation for chronic bacteriuria) admitted with acute cystitis with hematuria complicated by Strep septicemia, severe sepsis with septic shock and acute respiratory failure.   Intubated and on pressors 21 Extubated 5/10/21, off pressors.  CHF   Echo noted  HORACIO with AUR- arrington placed by urology 21  Leukocytosis multifactorial    Plan :   Repeat bood cultures  Cont Zosyn  Fu repeat cultures  Trend temps and cbc  Arrington per urology  Asp precautions    Continue with present regiment.  Appropriate use of antibiotics and adverse effects reviewed.        Critical care time greater then 35 minutes reviewing notes, labs data/ imaging , discussion with multidisciplinary team.    Thank you for allowing me to participate in care of your patient .        Frank Hernandez MD  Infectious Disease  [phone]

## 2021-05-10 NOTE — PROGRESS NOTE ADULT - SUBJECTIVE AND OBJECTIVE BOX
Date/Time Patient Seen:  		  Referring MD:   Data Reviewed	       Patient is a 81y old  Male who presents with a chief complaint of hematuria, nausea, weakness (10 May 2021 00:01)      Subjective/HPI     PAST MEDICAL & SURGICAL HISTORY:  Hypertension    Hyperlipidemia    BPH (benign prostatic hyperplasia)    History of prostate surgery          Medication list         MEDICATIONS  (STANDING):  ALBUTerol    90 MICROgram(s) HFA Inhaler 2 Puff(s) Inhalation every 8 hours  chlorhexidine 0.12% Liquid 15 milliLiter(s) Oral Mucosa two times a day  chlorhexidine 2% Cloths 1 Application(s) Topical daily  dexMEDEtomidine Infusion 1 MICROgram(s)/kG/Hr (19 mL/Hr) IV Continuous <Continuous>  dorzolamide 2%/timolol 0.5% Ophthalmic Solution 1 Drop(s) Both EYES two times a day  hydrocortisone sodium succinate Injectable 100 milliGRAM(s) IV Push every 8 hours  latanoprost 0.005% Ophthalmic Solution 1 Drop(s) Both EYES at bedtime  midodrine 10 milliGRAM(s) Oral every 8 hours  norepinephrine Infusion 0.05 MICROgram(s)/kG/Min (3.56 mL/Hr) IV Continuous <Continuous>  pantoprazole  Injectable 40 milliGRAM(s) IV Push daily  piperacillin/tazobactam IVPB.. 3.375 Gram(s) IV Intermittent every 8 hours    MEDICATIONS  (PRN):  acetaminophen   Tablet .. 650 milliGRAM(s) Oral every 6 hours PRN Temp greater or equal to 38C (100.4F), Mild Pain (1 - 3)         Vitals log        ICU Vital Signs Last 24 Hrs  T(C): 37 (10 May 2021 04:05), Max: 37.9 (09 May 2021 16:30)  T(F): 98.6 (10 May 2021 04:05), Max: 100.2 (09 May 2021 16:30)  HR: 73 (10 May 2021 06:45) (70 - 94)  BP: 110/67 (10 May 2021 06:45) (95/63 - 146/81)  BP(mean): 81 (10 May 2021 06:45) (71 - 100)  ABP: --  ABP(mean): --  RR: 15 (10 May 2021 06:45) (14 - 23)  SpO2: 95% (10 May 2021 06:45) (95% - 100%)       Mode: AC/ CMV (Assist Control/ Continuous Mandatory Ventilation)  RR (machine): 16  TV (machine): 500  FiO2: 35  PEEP: 5  ITime: 1  MAP: 9  PIP: 18      Input and Output:  I&O's Detail    08 May 2021 07:01  -  09 May 2021 07:00  --------------------------------------------------------  IN:    Dexmedetomidine: 1 mL    Enteral Tube Flush: 150 mL    IV PiggyBack: 350 mL    Jevity 1.5: 60 mL    Norepinephrine: 696 mL    Propofol: 123.4 mL    Sodium Bicarbonate: 450 mL  Total IN: 1830.4 mL    OUT:    Indwelling Catheter - Urethral (mL): 2650 mL  Total OUT: 2650 mL    Total NET: -819.6 mL      09 May 2021 07:01  -  10 May 2021 06:48  --------------------------------------------------------  IN:    Dexmedetomidine: 5.7 mL    Enteral Tube Flush: 425 mL    IV PiggyBack: 150 mL    Jevity 1.5: 335 mL    Norepinephrine: 241.8 mL    Propofol: 110.8 mL  Total IN: 1268.3 mL    OUT:    Indwelling Catheter - Urethral (mL): 900 mL  Total OUT: 900 mL    Total NET: 368.3 mL          Lab Data                        10.6   20.59 )-----------( 48       ( 10 May 2021 06:11 )             32.3     05-09    144  |  112<H>  |  38<H>  ----------------------------<  119<H>  5.0   |  18<L>  |  2.27<H>    Ca    7.4<L>      09 May 2021 10:30  Phos  4.5     05-09  Mg     1.9     05-09    TPro  5.6<L>  /  Alb  2.5<L>  /  TBili  3.5<H>  /  DBili  x   /  AST  129<H>  /  ALT  110<H>  /  AlkPhos  55  05-08    ABG - ( 10 May 2021 05:55 )  pH, Arterial: 7.37  pH, Blood: x     /  pCO2: 38    /  pO2: 104   / HCO3: 22    / Base Excess: -3.0  /  SaO2: 97                      Review of Systems	      Objective     Physical Examination    heart s1s2  lung dec BS  abd soft  head nc  et tube      Pertinent Lab findings & Imaging      Christopher:  NO   Adequate UO     I&O's Detail    08 May 2021 07:01  -  09 May 2021 07:00  --------------------------------------------------------  IN:    Dexmedetomidine: 1 mL    Enteral Tube Flush: 150 mL    IV PiggyBack: 350 mL    Jevity 1.5: 60 mL    Norepinephrine: 696 mL    Propofol: 123.4 mL    Sodium Bicarbonate: 450 mL  Total IN: 1830.4 mL    OUT:    Indwelling Catheter - Urethral (mL): 2650 mL  Total OUT: 2650 mL    Total NET: -819.6 mL      09 May 2021 07:01  -  10 May 2021 06:48  --------------------------------------------------------  IN:    Dexmedetomidine: 5.7 mL    Enteral Tube Flush: 425 mL    IV PiggyBack: 150 mL    Jevity 1.5: 335 mL    Norepinephrine: 241.8 mL    Propofol: 110.8 mL  Total IN: 1268.3 mL    OUT:    Indwelling Catheter - Urethral (mL): 900 mL  Total OUT: 900 mL    Total NET: 368.3 mL               Discussed with:     Cultures:	        Radiology

## 2021-05-10 NOTE — PROGRESS NOTE ADULT - SUBJECTIVE AND OBJECTIVE BOX
Patient is a 81y old  Male who presents with a chief complaint of hematuria, nausea, weakness (10 May 2021 14:06)    Patient seen in follow up for HORACIO.        PAST MEDICAL HISTORY:  Hypertension    Hyperlipidemia    BPH (benign prostatic hyperplasia)      MEDICATIONS  (STANDING):  ALBUTerol    90 MICROgram(s) HFA Inhaler 2 Puff(s) Inhalation every 8 hours  chlorhexidine 0.12% Liquid 15 milliLiter(s) Oral Mucosa two times a day  chlorhexidine 2% Cloths 1 Application(s) Topical daily  dorzolamide 2%/timolol 0.5% Ophthalmic Solution 1 Drop(s) Both EYES two times a day  hydrocortisone sodium succinate Injectable 50 milliGRAM(s) IV Push every 8 hours  iron sucrose Injectable 100 milliGRAM(s) IV Push every 24 hours  latanoprost 0.005% Ophthalmic Solution 1 Drop(s) Both EYES at bedtime  midodrine 10 milliGRAM(s) Oral every 8 hours  pantoprazole  Injectable 40 milliGRAM(s) IV Push daily  piperacillin/tazobactam IVPB.. 3.375 Gram(s) IV Intermittent every 8 hours    MEDICATIONS  (PRN):  acetaminophen   Tablet .. 650 milliGRAM(s) Oral every 6 hours PRN Temp greater or equal to 38C (100.4F), Mild Pain (1 - 3)    T(C): 36.4 (05-10-21 @ 13:21), Max: 37.9 (21 @ 16:30)  HR: 71 (05-10-21 @ 14:45) (56 - 100)  BP: 137/80 (05-10-21 @ 14:00) (78/54 - 155/90)  RR: 19 (05-10-21 @ 14:00) (11 - 39)  SpO2: 100% (05-10-21 @ 14:45) (95% - 100%)  Wt(kg): --  I&O's Detail    09 May 2021 07:01  -  10 May 2021 07:00  --------------------------------------------------------  IN:    Dexmedetomidine: 5.7 mL    Dexmedetomidine: 18 mL    Enteral Tube Flush: 425 mL    IV PiggyBack: 250 mL    Jevity 1.5: 335 mL    Norepinephrine: 241.8 mL    Propofol: 110.8 mL  Total IN: 1386.3 mL    OUT:    Indwelling Catheter - Urethral (mL): 2100 mL  Total OUT: 2100 mL    Total NET: -713.7 mL          PHYSICAL EXAM:  General: No distress  Respiratory: b/l air entry  Cardiovascular: S1 S2  Gastrointestinal: soft  Extremities:  no edema    Mode: CPAP with PS, FiO2: 35, PEEP: 5, PS: 5, ITime: 1, MAP: 8, PIP: 10                          10.6   20.59 )-----------( 48       ( 10 May 2021 06:11 )             32.3     0510    144  |  114<H>  |  37<H>  ----------------------------<  144<H>  4.6   |  25  |  1.59<H>    Ca    7.9<L>      10 May 2021 06:11  Phos  2.4     05-10  Mg     2.3     05-10    TPro  x   /  Alb  3.1<L>  /  TBili  x   /  DBili  x   /  AST  x   /  ALT  x   /  AlkPhos  x           LIVER FUNCTIONS - ( 08 May 2021 22:28 )  Alb: 3.1 g/dL / Pro: x     / ALK PHOS: x     / ALT: x     / AST: x     / GGT: x           Urinalysis Basic - ( 08 May 2021 16:46 )    Color: Yellow / Appearance: Clear / S.010 / pH: x  Gluc: x / Ketone: Negative  / Bili: Negative / Urobili: Negative mg/dL   Blood: x / Protein: 30 mg/dL / Nitrite: Negative   Leuk Esterase: Moderate / RBC: 3-5 /HPF / WBC 3-5   Sq Epi: x / Non Sq Epi: Occasional / Bacteria: Occasional      ABG - ( 10 May 2021 05:55 )  pH, Arterial: 7.37  pH, Blood: x     /  pCO2: 38    /  pO2: 104   / HCO3: 22    / Base Excess: -3.0  /  SaO2: 97                Sodium, Serum: 144 (05-10 @ 06:11)  Sodium, Serum: 144 ( @ 10:30)  Sodium, Serum: 141 ( @ 20:28)  Sodium, Serum: 138 ( @ 14:06)    Creatinine, Serum: 1.59 (05-10 @ 06:11)  Creatinine, Serum: 2.27 ( 10:30)  Creatinine, Serum: 2.52 ( @ 20:28)  Creatinine, Serum: 2.61 ( @ 14:06)  Creatinine, Serum: 2.81 ( 06:40)  Creatinine, Serum: 2.31 ( @ 16:24)  Creatinine, Serum: 1.74 ( 07:26)  Creatinine, Serum: 1.34 ( @ 22:38)    Potassium, Serum: 4.6 (05-10 @ 06:11)  Potassium, Serum: 5.0 ( 10:30)  Potassium, Serum: 5.2 ( @ 20:28)  Potassium, Serum: 5.7 ( @ 14:06)    Hemoglobin: 10.6 (05-10 @ 06:11)  Hemoglobin: 11.6 ( @ 06:38)  Hemoglobin: 11.3 ( @ 20:28)  Hemoglobin: 10.7 ( @ 14:06)

## 2021-05-10 NOTE — PROGRESS NOTE ADULT - ASSESSMENT
ID f/u noted - ABX changed to Zosyn -   SBT this am   ABG noted      81M with HTN, BPH who presents with hematuria and weakness.  ventilated - ABG noted - HOB elev - asp prec -fio2 titration SBT trials as tolerated  Sepsis - Shock - eval in progress - serial Lactic acid - s/p IVF - pressors - I and O - monitor VS and HD and Sat  CKD - HORACIO - s/p IVF - Pressors - Renal Eval  keep MAP > 60, Midodrine 10 mg PO TID - Pressors IV as needed   Serial labs - replete lytes - monitor biomarkers  ID eval noted - pt is on broad ABX  - cx reviewed  I jeremy - mucus impaction - mucus plugging on imaging - Albuterol PRN - Chest PT -   BPH rx regimen  US abd - CT renal stone hunt - CXR - noted  SPCU monitoring - care and supportive measures

## 2021-05-11 LAB
ANISOCYTOSIS BLD QL: SLIGHT — SIGNIFICANT CHANGE UP
APTT BLD: 32 SEC — SIGNIFICANT CHANGE UP (ref 27.5–35.5)
BASOPHILS # BLD AUTO: 0 K/UL — SIGNIFICANT CHANGE UP (ref 0–0.2)
BASOPHILS NFR BLD AUTO: 0 % — SIGNIFICANT CHANGE UP (ref 0–2)
CULTURE RESULTS: SIGNIFICANT CHANGE UP
EOSINOPHIL # BLD AUTO: 0 K/UL — SIGNIFICANT CHANGE UP (ref 0–0.5)
EOSINOPHIL NFR BLD AUTO: 0 % — SIGNIFICANT CHANGE UP (ref 0–6)
HCT VFR BLD CALC: 33 % — LOW (ref 39–50)
HGB BLD-MCNC: 10.7 G/DL — LOW (ref 13–17)
INR BLD: 1.02 RATIO — SIGNIFICANT CHANGE UP (ref 0.88–1.16)
LYMPHOCYTES # BLD AUTO: 1.79 K/UL — SIGNIFICANT CHANGE UP (ref 1–3.3)
LYMPHOCYTES # BLD AUTO: 6 % — LOW (ref 13–44)
MANUAL SMEAR VERIFICATION: SIGNIFICANT CHANGE UP
MCHC RBC-ENTMCNC: 29.2 PG — SIGNIFICANT CHANGE UP (ref 27–34)
MCHC RBC-ENTMCNC: 32.4 GM/DL — SIGNIFICANT CHANGE UP (ref 32–36)
MCV RBC AUTO: 90.2 FL — SIGNIFICANT CHANGE UP (ref 80–100)
MONOCYTES # BLD AUTO: 2.38 K/UL — HIGH (ref 0–0.9)
MONOCYTES NFR BLD AUTO: 8 % — SIGNIFICANT CHANGE UP (ref 2–14)
NEUTROPHILS # BLD AUTO: 25.63 K/UL — HIGH (ref 1.8–7.4)
NEUTROPHILS NFR BLD AUTO: 79 % — HIGH (ref 43–77)
NEUTS BAND # BLD: 7 % — SIGNIFICANT CHANGE UP (ref 0–8)
NRBC # BLD: 0 — SIGNIFICANT CHANGE UP
NRBC # BLD: SIGNIFICANT CHANGE UP /100 WBCS (ref 0–0)
NT-PROBNP SERPL-SCNC: HIGH PG/ML (ref 0–450)
PLAT MORPH BLD: NORMAL — SIGNIFICANT CHANGE UP
PLATELET # BLD AUTO: 44 K/UL — LOW (ref 150–400)
POIKILOCYTOSIS BLD QL AUTO: SLIGHT — SIGNIFICANT CHANGE UP
PROTHROM AB SERPL-ACNC: 12.3 SEC — SIGNIFICANT CHANGE UP (ref 10.6–13.6)
RBC # BLD: 3.66 M/UL — LOW (ref 4.2–5.8)
RBC # FLD: 13.5 % — SIGNIFICANT CHANGE UP (ref 10.3–14.5)
RBC BLD AUTO: SIGNIFICANT CHANGE UP
SPECIMEN SOURCE: SIGNIFICANT CHANGE UP
TOXIC GRANULES BLD QL SMEAR: PRESENT — SIGNIFICANT CHANGE UP
WBC # BLD: 29.8 K/UL — HIGH (ref 3.8–10.5)
WBC # FLD AUTO: 29.8 K/UL — HIGH (ref 3.8–10.5)

## 2021-05-11 PROCEDURE — 99233 SBSQ HOSP IP/OBS HIGH 50: CPT

## 2021-05-11 RX ORDER — MIDODRINE HYDROCHLORIDE 2.5 MG/1
5 TABLET ORAL EVERY 8 HOURS
Refills: 0 | Status: DISCONTINUED | OUTPATIENT
Start: 2021-05-11 | End: 2021-05-12

## 2021-05-11 RX ORDER — HYDROCORTISONE 20 MG
50 TABLET ORAL
Refills: 0 | Status: DISCONTINUED | OUTPATIENT
Start: 2021-05-11 | End: 2021-05-11

## 2021-05-11 RX ORDER — ALBUTEROL 90 UG/1
2.5 AEROSOL, METERED ORAL EVERY 8 HOURS
Refills: 0 | Status: DISCONTINUED | OUTPATIENT
Start: 2021-05-11 | End: 2021-05-14

## 2021-05-11 RX ADMIN — MIDODRINE HYDROCHLORIDE 5 MILLIGRAM(S): 2.5 TABLET ORAL at 22:47

## 2021-05-11 RX ADMIN — CHLORHEXIDINE GLUCONATE 15 MILLILITER(S): 213 SOLUTION TOPICAL at 11:38

## 2021-05-11 RX ADMIN — MIDODRINE HYDROCHLORIDE 5 MILLIGRAM(S): 2.5 TABLET ORAL at 13:03

## 2021-05-11 RX ADMIN — DORZOLAMIDE HYDROCHLORIDE TIMOLOL MALEATE 1 DROP(S): 20; 5 SOLUTION/ DROPS OPHTHALMIC at 05:17

## 2021-05-11 RX ADMIN — CHLORHEXIDINE GLUCONATE 1 APPLICATION(S): 213 SOLUTION TOPICAL at 11:38

## 2021-05-11 RX ADMIN — PANTOPRAZOLE SODIUM 40 MILLIGRAM(S): 20 TABLET, DELAYED RELEASE ORAL at 11:38

## 2021-05-11 RX ADMIN — ALBUTEROL 2.5 MILLIGRAM(S): 90 AEROSOL, METERED ORAL at 19:53

## 2021-05-11 RX ADMIN — ALBUTEROL 2 PUFF(S): 90 AEROSOL, METERED ORAL at 06:59

## 2021-05-11 RX ADMIN — PIPERACILLIN AND TAZOBACTAM 25 GRAM(S): 4; .5 INJECTION, POWDER, LYOPHILIZED, FOR SOLUTION INTRAVENOUS at 05:18

## 2021-05-11 RX ADMIN — IRON SUCROSE 100 MILLIGRAM(S): 20 INJECTION, SOLUTION INTRAVENOUS at 17:06

## 2021-05-11 RX ADMIN — DORZOLAMIDE HYDROCHLORIDE TIMOLOL MALEATE 1 DROP(S): 20; 5 SOLUTION/ DROPS OPHTHALMIC at 17:11

## 2021-05-11 RX ADMIN — PIPERACILLIN AND TAZOBACTAM 25 GRAM(S): 4; .5 INJECTION, POWDER, LYOPHILIZED, FOR SOLUTION INTRAVENOUS at 22:47

## 2021-05-11 RX ADMIN — LATANOPROST 1 DROP(S): 0.05 SOLUTION/ DROPS OPHTHALMIC; TOPICAL at 22:47

## 2021-05-11 RX ADMIN — Medication 50 MILLIGRAM(S): at 05:17

## 2021-05-11 RX ADMIN — ALBUTEROL 2.5 MILLIGRAM(S): 90 AEROSOL, METERED ORAL at 15:29

## 2021-05-11 RX ADMIN — MIDODRINE HYDROCHLORIDE 10 MILLIGRAM(S): 2.5 TABLET ORAL at 05:18

## 2021-05-11 RX ADMIN — PIPERACILLIN AND TAZOBACTAM 25 GRAM(S): 4; .5 INJECTION, POWDER, LYOPHILIZED, FOR SOLUTION INTRAVENOUS at 13:03

## 2021-05-11 NOTE — PROGRESS NOTE ADULT - SUBJECTIVE AND OBJECTIVE BOX
Subjective: Patient seen and examined. No overnight events.     MEDICATIONS  (STANDING):  ALBUTerol    0.083% 2.5 milliGRAM(s) Nebulizer every 8 hours  chlorhexidine 0.12% Liquid 15 milliLiter(s) Oral Mucosa two times a day  chlorhexidine 2% Cloths 1 Application(s) Topical daily  dorzolamide 2%/timolol 0.5% Ophthalmic Solution 1 Drop(s) Both EYES two times a day  hydrocortisone sodium succinate Injectable 50 milliGRAM(s) IV Push two times a day  iron sucrose Injectable 100 milliGRAM(s) IV Push every 24 hours  latanoprost 0.005% Ophthalmic Solution 1 Drop(s) Both EYES at bedtime  midodrine 5 milliGRAM(s) Oral every 8 hours  pantoprazole  Injectable 40 milliGRAM(s) IV Push daily  piperacillin/tazobactam IVPB.. 3.375 Gram(s) IV Intermittent every 8 hours    MEDICATIONS  (PRN):  acetaminophen   Tablet .. 650 milliGRAM(s) Oral every 6 hours PRN Temp greater or equal to 38C (100.4F), Mild Pain (1 - 3)      Allergies    No Known Allergies    Intolerances        Vital Signs Last 24 Hrs  T(C): 36.5 (11 May 2021 04:05), Max: 37.1 (11 May 2021 00:05)  T(F): 97.7 (11 May 2021 04:05), Max: 98.7 (11 May 2021 00:05)  HR: 68 (11 May 2021 07:00) (56 - 76)  BP: 132/73 (11 May 2021 07:00) (83/56 - 144/81)  BP(mean): 89 (11 May 2021 07:00) (63 - 99)  RR: 18 (11 May 2021 07:00) (11 - 39)  SpO2: 94% (11 May 2021 07:00) (93% - 100%)    PHYSICAL EXAM:  GENERAL: NAD, well-groomed, well-developed  HEAD:  Atraumatic, Normocephalic  ENMT: Moist mucous membranes,   NECK: Supple, No JVD, Normal thyroid  NERVOUS SYSTEM:  All 4 extremities mobile, no gross sensory deficits.   CHEST/LUNG: Clear to auscultation bilaterally; No rales, rhonchi, wheezing, or rubs  HEART: Regular rate and rhythm; No murmurs, rubs, or gallops  ABDOMEN: Soft, Nontender, Nondistended; Bowel sounds present  EXTREMITIES:  2+ Peripheral Pulses, No clubbing, cyanosis, or edema      LABS:                        10.7   29.80 )-----------( 44       ( 11 May 2021 07:06 )             33.0       Ca    7.9        10 May 2021 06:11          CAPILLARY BLOOD GLUCOSE          RADIOLOGY & ADDITIONAL TESTS:    Imaging Personally Reviewed:  [ ] YES     Consultant(s) Notes Reviewed:      Care Discussed with Consultants/Other Providers:    Advanced Directives: [ ] DNR  [ ] No feeding tube  [ ] MOLST in chart  [ ] MOLST completed today  [ ] Unknown

## 2021-05-11 NOTE — PROGRESS NOTE ADULT - ASSESSMENT
remains on CPAP   VS noted  LABS reviewed  Cr shows improvement    81M with HTN, BPH who presents with hematuria and weakness.  extubated to CPAP  Sepsis - Shock - on ABX - ID following - MAP > 60, Cx and biomarkers reviewed,   CKD - HORACIO - s/p IVF - Pressors - Renal Eval  keep MAP > 60, Midodrine 10 mg PO TID - Pressors IV as needed   Serial labs - replete lytes - monitor biomarkers  ID eval noted - pt is on broad ABX  - cx reviewed  I jeremy - mucus impaction - mucus plugging on imaging - Albuterol PRN - Chest PT -   BPH rx regimen  US abd - CT renal stone hunt - CXR - noted  SPCU monitoring - care and supportive measures

## 2021-05-11 NOTE — PROGRESS NOTE ADULT - SUBJECTIVE AND OBJECTIVE BOX
Patient is a 81y old  Male who presents with a chief complaint of hematuria, nausea, weakness (11 May 2021 08:19)       Pt is seen and examined  pt is awake and lying in bed/out of bed to chair  pt seems comfortable and denies any complaints at this time    HPI:  81M with HTN, BPH who presents with hematuria and weakness.  Patient had a cystoscopy about 2 weeks ago (for evaluation for chronic bacteriuria) and since then has had intermittent hematuria.  Was given 3 days of an abx post-procedure.  Then today (Thursday), patient noted gross blood from his urine.  Patient also started to have chills, fevers, and nausea.  No pain.  No recent sick contacts.   COVID vaccine with Pfizer was done >1 month ago.  No other complaints.  Brought here for further evaluation.  In the ED, patient's triage VS were /65  HR 97  RR 24, 95%, T 101.2F.  Physical exam revealed that he was becoming altered (did not know where he was).  Labs were significant for leukopenia at WBC 0.78, Cr 1.34, lactate 2.3, and UA with +nitrite, mod LE with 0-2 WBC, large blood with >50 RBC, occasional bacteria.  Was started on ceftriaxone empirically.  CT renal showed possible cystitis, multiple bladder diverticula with likely layering calcifications within the posterior diverticula and along the posterior bladder wall, fullness in left renal pelvis.  Patient also had a head CT for AMS.  However, niece said that the patient was more lucid after the fluids and is no longer altered and responding accordingly.  Patient is being admitted for sepsis 2/2 UTI/ infection.   (07 May 2021 01:13)         ROS:  Negative except for:    MEDICATIONS  (STANDING):  ALBUTerol    0.083% 2.5 milliGRAM(s) Nebulizer every 8 hours  chlorhexidine 0.12% Liquid 15 milliLiter(s) Oral Mucosa two times a day  chlorhexidine 2% Cloths 1 Application(s) Topical daily  dorzolamide 2%/timolol 0.5% Ophthalmic Solution 1 Drop(s) Both EYES two times a day  iron sucrose Injectable 100 milliGRAM(s) IV Push every 24 hours  latanoprost 0.005% Ophthalmic Solution 1 Drop(s) Both EYES at bedtime  midodrine 5 milliGRAM(s) Oral every 8 hours  pantoprazole  Injectable 40 milliGRAM(s) IV Push daily  piperacillin/tazobactam IVPB.. 3.375 Gram(s) IV Intermittent every 8 hours    MEDICATIONS  (PRN):  acetaminophen   Tablet .. 650 milliGRAM(s) Oral every 6 hours PRN Temp greater or equal to 38C (100.4F), Mild Pain (1 - 3)      Allergies    No Known Allergies    Intolerances        Vital Signs Last 24 Hrs  T(C): 36.2 (11 May 2021 08:30), Max: 37.1 (11 May 2021 00:05)  T(F): 97.1 (11 May 2021 08:30), Max: 98.7 (11 May 2021 00:05)  HR: 58 (11 May 2021 12:00) (58 - 76)  BP: 106/60 (11 May 2021 12:00) (103/79 - 144/81)  BP(mean): 75 (11 May 2021 12:00) (75 - 99)  RR: 12 (11 May 2021 12:00) (12 - 39)  SpO2: 100% (11 May 2021 12:00) (93% - 100%)    PHYSICAL EXAM  General: adult in NAD  HEENT: clear oropharynx, anicteric sclera, pink conjunctiva  Neck: supple  CV: normal S1/S2 with no murmur rubs or gallops  Lungs: positive air movement b/l ant lungs,clear to auscultation, no wheezes, no rales  Abdomen: soft non-tender non-distended, no hepatosplenomegaly  Ext: no clubbing cyanosis or edema  Skin: no rashes and no petechiae  Neuro: alert and oriented X 4, no focal deficits  LABS:                          10.7   29.80 )-----------( 44       ( 11 May 2021 07:06 )             33.0         Mean Cell Volume : 90.2 fl  Mean Cell Hemoglobin : 29.2 pg  Mean Cell Hemoglobin Concentration : 32.4 gm/dL  Auto Neutrophil # : 25.63 K/uL  Auto Lymphocyte # : 1.79 K/uL  Auto Monocyte # : 2.38 K/uL  Auto Eosinophil # : 0.00 K/uL  Auto Basophil # : 0.00 K/uL  Auto Neutrophil % : 79.0 %  Auto Lymphocyte % : 6.0 %  Auto Monocyte % : 8.0 %  Auto Eosinophil % : 0.0 %  Auto Basophil % : 0.0 %    Serial CBC's  05-11 @ 07:06  Hct-33.0 / Hgb-10.7 / Plat-44 / RBC-3.66 / WBC-29.80          Serial CBC's  05-10 @ 06:11  Hct-32.3 / Hgb-10.6 / Plat-48 / RBC-3.57 / WBC-20.59          Serial CBC's  05-09 @ 06:38  Hct-35.2 / Hgb-11.6 / Plat-59 / RBC-3.92 / WBC-20.59          Serial CBC's  05-08 @ 20:28  Hct-34.6 / Hgb-11.3 / Plat-65 / RBC-3.80 / WBC-16.03          Serial CBC's  05-08 @ 14:06  Hct-32.5 / Hgb-10.7 / Plat-66 / RBC-3.59 / WBC-14.05            05-11    142  |  111<H>  |  39<H>  ----------------------------<  117<H>  4.1   |  25  |  1.38<H>    Ca    8.0<L>      11 May 2021 07:19  Phos  2.4     05-10  Mg     2.3     05-10    TPro  5.2<L>  /  Alb  2.0<L>  /  TBili  1.6<H>  /  DBili  x   /  AST  57<H>  /  ALT  83<H>  /  AlkPhos  445<H>  05-11      PT/INR - ( 11 May 2021 08:44 )   PT: 12.3 sec;   INR: 1.02 ratio         PTT - ( 11 May 2021 08:44 )  PTT:32.0 sec    Ferritin, Serum: 374 ng/mL (05-09-21 @ 00:08)  Vitamin B12, Serum: 1096 pg/mL (05-09-21 @ 00:08)  Folate, Serum: 8.7 ng/mL (05-09-21 @ 00:08)  Iron - Total Binding Capacity.: 187 ug/dL (05-08-21 @ 22:33)  Reticulocyte Percent: 1.3 % (05-08-21 @ 14:06)      Serum Protein Electrophoresis Interp: Normal Electrophoresis Pattern (05-08-21 @ 22:28)            BLOOD SMEAR INTERPRETATION:       RADIOLOGY & ADDITIONAL STUDIES:     Patient is a 81y old  Male who presents with a chief complaint of hematuria, nausea, weakness (11 May 2021 08:19)       Pt is seen and examined in ICU  pt is awake and lying in bed, having his lunch  pt seems comfortable at this time    HPI:  81M with HTN, BPH who presents with hematuria and weakness.  Patient had a cystoscopy about 2 weeks ago (for evaluation for chronic bacteriuria) and since then has had intermittent hematuria.  Was given 3 days of an abx post-procedure.  Then today (Thursday), patient noted gross blood from his urine.  Patient also started to have chills, fevers, and nausea.  No pain.  No recent sick contacts.   COVID vaccine with Pfizer was done >1 month ago.  No other complaints.  Brought here for further evaluation.  In the ED, patient's triage VS were /65  HR 97  RR 24, 95%, T 101.2F.  Physical exam revealed that he was becoming altered (did not know where he was).  Labs were significant for leukopenia at WBC 0.78, Cr 1.34, lactate 2.3, and UA with +nitrite, mod LE with 0-2 WBC, large blood with >50 RBC, occasional bacteria.  Was started on ceftriaxone empirically.  CT renal showed possible cystitis, multiple bladder diverticula with likely layering calcifications within the posterior diverticula and along the posterior bladder wall, fullness in left renal pelvis.  Patient also had a head CT for AMS.  However, niece said that the patient was more lucid after the fluids and is no longer altered and responding accordingly.  Patient is being admitted for sepsis 2/2 UTI/ infection.   (07 May 2021 01:13)         ROS:  as per hpi    MEDICATIONS  (STANDING):  ALBUTerol    0.083% 2.5 milliGRAM(s) Nebulizer every 8 hours  chlorhexidine 0.12% Liquid 15 milliLiter(s) Oral Mucosa two times a day  chlorhexidine 2% Cloths 1 Application(s) Topical daily  dorzolamide 2%/timolol 0.5% Ophthalmic Solution 1 Drop(s) Both EYES two times a day  iron sucrose Injectable 100 milliGRAM(s) IV Push every 24 hours  latanoprost 0.005% Ophthalmic Solution 1 Drop(s) Both EYES at bedtime  midodrine 5 milliGRAM(s) Oral every 8 hours  pantoprazole  Injectable 40 milliGRAM(s) IV Push daily  piperacillin/tazobactam IVPB.. 3.375 Gram(s) IV Intermittent every 8 hours    MEDICATIONS  (PRN):  acetaminophen   Tablet .. 650 milliGRAM(s) Oral every 6 hours PRN Temp greater or equal to 38C (100.4F), Mild Pain (1 - 3)      Allergies    No Known Allergies    Intolerances        Vital Signs Last 24 Hrs  T(C): 36.2 (11 May 2021 08:30), Max: 37.1 (11 May 2021 00:05)  T(F): 97.1 (11 May 2021 08:30), Max: 98.7 (11 May 2021 00:05)  HR: 58 (11 May 2021 12:00) (58 - 76)  BP: 106/60 (11 May 2021 12:00) (103/79 - 144/81)  BP(mean): 75 (11 May 2021 12:00) (75 - 99)  RR: 12 (11 May 2021 12:00) (12 - 39)  SpO2: 100% (11 May 2021 12:00) (93% - 100%)    PHYSICAL EXAM  General: adult in NAD  HEENT: clear oropharynx, anicteric sclera, pink conjunctiva  Neck: supple  CV: normal S1/S2 with no murmur rubs or gallops  Lungs: positive air movement b/l ant lungs,clear to auscultation, no wheezes, no rales  Abdomen: soft non-tender non-distended, no hepatosplenomegaly  Ext: no clubbing cyanosis or edema  Skin: no rashes and no petechiae  Neuro: alert and oriented X 4, no focal deficits  LABS:                          10.7   29.80 )-----------( 44       ( 11 May 2021 07:06 )             33.0         Mean Cell Volume : 90.2 fl  Mean Cell Hemoglobin : 29.2 pg  Mean Cell Hemoglobin Concentration : 32.4 gm/dL  Auto Neutrophil # : 25.63 K/uL  Auto Lymphocyte # : 1.79 K/uL  Auto Monocyte # : 2.38 K/uL  Auto Eosinophil # : 0.00 K/uL  Auto Basophil # : 0.00 K/uL  Auto Neutrophil % : 79.0 %  Auto Lymphocyte % : 6.0 %  Auto Monocyte % : 8.0 %  Auto Eosinophil % : 0.0 %  Auto Basophil % : 0.0 %    Serial CBC's  05-11 @ 07:06  Hct-33.0 / Hgb-10.7 / Plat-44 / RBC-3.66 / WBC-29.80          Serial CBC's  05-10 @ 06:11  Hct-32.3 / Hgb-10.6 / Plat-48 / RBC-3.57 / WBC-20.59          Serial CBC's  05-09 @ 06:38  Hct-35.2 / Hgb-11.6 / Plat-59 / RBC-3.92 / WBC-20.59          Serial CBC's  05-08 @ 20:28  Hct-34.6 / Hgb-11.3 / Plat-65 / RBC-3.80 / WBC-16.03          Serial CBC's  05-08 @ 14:06  Hct-32.5 / Hgb-10.7 / Plat-66 / RBC-3.59 / WBC-14.05            05-11    142  |  111<H>  |  39<H>  ----------------------------<  117<H>  4.1   |  25  |  1.38<H>    Ca    8.0<L>      11 May 2021 07:19  Phos  2.4     05-10  Mg     2.3     05-10    TPro  5.2<L>  /  Alb  2.0<L>  /  TBili  1.6<H>  /  DBili  x   /  AST  57<H>  /  ALT  83<H>  /  AlkPhos  445<H>  05-11      PT/INR - ( 11 May 2021 08:44 )   PT: 12.3 sec;   INR: 1.02 ratio         PTT - ( 11 May 2021 08:44 )  PTT:32.0 sec    Ferritin, Serum: 374 ng/mL (05-09-21 @ 00:08)  Vitamin B12, Serum: 1096 pg/mL (05-09-21 @ 00:08)  Folate, Serum: 8.7 ng/mL (05-09-21 @ 00:08)  Iron - Total Binding Capacity.: 187 ug/dL (05-08-21 @ 22:33)  Reticulocyte Percent: 1.3 % (05-08-21 @ 14:06)      Serum Protein Electrophoresis Interp: Normal Electrophoresis Pattern (05-08-21 @ 22:28)

## 2021-05-11 NOTE — PROGRESS NOTE ADULT - SUBJECTIVE AND OBJECTIVE BOX
Chief Complaint: Hematuria, fevers    Interval Events: Extubated yesterday. No complaints.    Review of Systems:  General: No fevers, chills, weight loss or gain  Skin: No rashes, color changes  Cardiovascular: No chest pain, orthopnea  Respiratory: No shortness of breath, cough  Gastrointestinal: No nausea, abdominal pain  Genitourinary: No incontinence, pain with urination  Musculoskeletal: No pain, swelling, decreased range of motion  Neurological: No headache, weakness  Psychiatric: No depression, anxiety  Endocrine: No weight loss or gain, increased thirst  All other systems are comprehensively negative.    Physical Exam:  Vitals:        Vital Signs Last 24 Hrs  T(C): 36.5 (11 May 2021 04:05), Max: 37.1 (11 May 2021 00:05)  T(F): 97.7 (11 May 2021 04:05), Max: 98.7 (11 May 2021 00:05)  HR: 68 (11 May 2021 07:00) (56 - 76)  BP: 132/73 (11 May 2021 07:00) (83/56 - 144/81)  BP(mean): 89 (11 May 2021 07:00) (63 - 99)  RR: 18 (11 May 2021 07:00) (11 - 39)  SpO2: 94% (11 May 2021 07:00) (93% - 100%)  General: NAD  HEENT: MMM  Neck: No JVD, no carotid bruit  Lungs: CTAB  CV: RRR, nl S1/S2, no M/R/G  Abdomen: S/NT/ND, +BS  Extremities: No LE edema, no cyanosis  Neuro: AAOx3, non-focal  Skin: No rash    Labs:                        10.7   29.80 )-----------( 44       ( 11 May 2021 07:06 )             33.0     05-10    144  |  114<H>  |  37<H>  ----------------------------<  144<H>  4.6   |  25  |  1.59<H>    Ca    7.9<L>      10 May 2021 06:11  Phos  2.4     05-10  Mg     2.3     05-10              Telemetry: Sinus rhythm, PVCs, ventricular triplet

## 2021-05-11 NOTE — PROGRESS NOTE ADULT - SUBJECTIVE AND OBJECTIVE BOX
Date/Time Patient Seen:  		  Referring MD:   Data Reviewed	       Patient is a 81y old  Male who presents with a chief complaint of hematuria, nausea, weakness (10 May 2021 16:14)      Subjective/HPI     PAST MEDICAL & SURGICAL HISTORY:  Hypertension    Hyperlipidemia    BPH (benign prostatic hyperplasia)    History of prostate surgery          Medication list         MEDICATIONS  (STANDING):  ALBUTerol    90 MICROgram(s) HFA Inhaler 2 Puff(s) Inhalation every 8 hours  chlorhexidine 0.12% Liquid 15 milliLiter(s) Oral Mucosa two times a day  chlorhexidine 2% Cloths 1 Application(s) Topical daily  dorzolamide 2%/timolol 0.5% Ophthalmic Solution 1 Drop(s) Both EYES two times a day  hydrocortisone sodium succinate Injectable 50 milliGRAM(s) IV Push every 8 hours  iron sucrose Injectable 100 milliGRAM(s) IV Push every 24 hours  latanoprost 0.005% Ophthalmic Solution 1 Drop(s) Both EYES at bedtime  midodrine 10 milliGRAM(s) Oral every 8 hours  pantoprazole  Injectable 40 milliGRAM(s) IV Push daily  piperacillin/tazobactam IVPB.. 3.375 Gram(s) IV Intermittent every 8 hours    MEDICATIONS  (PRN):  acetaminophen   Tablet .. 650 milliGRAM(s) Oral every 6 hours PRN Temp greater or equal to 38C (100.4F), Mild Pain (1 - 3)         Vitals log        ICU Vital Signs Last 24 Hrs  T(C): 36.5 (11 May 2021 04:05), Max: 37.1 (11 May 2021 00:05)  T(F): 97.7 (11 May 2021 04:05), Max: 98.7 (11 May 2021 00:05)  HR: 63 (11 May 2021 06:00) (56 - 76)  BP: 111/69 (11 May 2021 06:00) (78/54 - 144/81)  BP(mean): 83 (11 May 2021 06:00) (62 - 99)  ABP: --  ABP(mean): --  RR: 13 (11 May 2021 06:00) (11 - 39)  SpO2: 94% (11 May 2021 06:00) (93% - 100%)           Input and Output:  I&O's Detail    09 May 2021 07:01  -  10 May 2021 07:00  --------------------------------------------------------  IN:    Dexmedetomidine: 5.7 mL    Dexmedetomidine: 18 mL    Enteral Tube Flush: 425 mL    IV PiggyBack: 250 mL    Jevity 1.5: 335 mL    Norepinephrine: 241.8 mL    Propofol: 110.8 mL  Total IN: 1386.3 mL    OUT:    Indwelling Catheter - Urethral (mL): 2100 mL  Total OUT: 2100 mL    Total NET: -713.7 mL      10 May 2021 07:01  -  11 May 2021 06:57  --------------------------------------------------------  IN:    IV PiggyBack: 100 mL    Oral Fluid: 820 mL  Total IN: 920 mL    OUT:    Indwelling Catheter - Urethral (mL): 1000 mL  Total OUT: 1000 mL    Total NET: -80 mL          Lab Data                        10.6   20.59 )-----------( 48       ( 10 May 2021 06:11 )             32.3     05-10    144  |  114<H>  |  37<H>  ----------------------------<  144<H>  4.6   |  25  |  1.59<H>    Ca    7.9<L>      10 May 2021 06:11  Phos  2.4     05-10  Mg     2.3     05-10      ABG - ( 10 May 2021 05:55 )  pH, Arterial: 7.37  pH, Blood: x     /  pCO2: 38    /  pO2: 104   / HCO3: 22    / Base Excess: -3.0  /  SaO2: 97                      Review of Systems	      Objective     Physical Examination    heart s1s2  lung dec BS  abd soft  on o2 support -     Pertinent Lab findings & Imaging      Christopher:  NO   Adequate UO     I&O's Detail    09 May 2021 07:01  -  10 May 2021 07:00  --------------------------------------------------------  IN:    Dexmedetomidine: 5.7 mL    Dexmedetomidine: 18 mL    Enteral Tube Flush: 425 mL    IV PiggyBack: 250 mL    Jevity 1.5: 335 mL    Norepinephrine: 241.8 mL    Propofol: 110.8 mL  Total IN: 1386.3 mL    OUT:    Indwelling Catheter - Urethral (mL): 2100 mL  Total OUT: 2100 mL    Total NET: -713.7 mL      10 May 2021 07:01  -  11 May 2021 06:57  --------------------------------------------------------  IN:    IV PiggyBack: 100 mL    Oral Fluid: 820 mL  Total IN: 920 mL    OUT:    Indwelling Catheter - Urethral (mL): 1000 mL  Total OUT: 1000 mL    Total NET: -80 mL               Discussed with:     Cultures:	        Radiology

## 2021-05-11 NOTE — PROGRESS NOTE ADULT - ASSESSMENT
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     Septic Shock / Strep Bacteremia   IV Zosyn -BMP still pending; CBC reviewed  Infection does not seem to be  source given negative Urine Culture. Initially thought to be the case due to recent instrumentation in the setting of chornic bacteruria.  Repeat CT Imaging reviewed. If second set of cultures remains positive may need ERICK to rule out Endo  Extubated 5/10/21; Off Pressors and on Midodrine now   CC: Dr. Zayas; ID: Dr. Hernandez; Cards: Dr. Esteban    Hypoxic Respiratory Failure   Due to Sepsis and Volume Overload; BNP Elevated   Echo reviewed with no signs of failure;   Imaging does not show any obvious pulm infection  Extubated 5/10/21    Acute Renal Failure   In the setting of Sepsis; Improving  Renally dose meds and monitor BMP and electrolytes     Metabolic Encephalopathy   Improving  Due to Sepsis       Thrombocytopenia  In the setting of Sepsis  Avoid LMWH  Hematology (Dr. Madera)    HTN  Hold anti-HTN meds    BPH  Hold Flomax    Diet  Regular    DVT Prophylaxis  SCD    Disposition  Full Code/Inpatient  Discharge planning pending hospital course   CC Time spent >60 minutes        81M HTN, BPH and Chronic Bacteriuria who had cystoscopy last week admitted for Septic Shock from  infection.     Septic Shock / Strep Bacteremia   IV Zosyn -BMP still pending; CBC reviewed and leukocytosis mainly steroid driven at this point; Steroids stopped   Infection does not seem to be  source given negative Urine Culture. Initially thought to be the case due to recent instrumentation in the setting of chornic bacteruria.  Repeat CT Imaging reviewed. If second set of cultures remains positive may need ERICK to rule out Endo  Extubated 5/10/21; Off Pressors and Midodrine being tapered   CC: Dr. Zayas; ID: Dr. Hernandez; Cards: Dr. Esteban    Hypoxic Respiratory Failure   Due to Sepsis and Volume Overload; BNP Elevated   Echo reviewed with no signs of failure;   Imaging does not show any obvious pulm infection  Extubated 5/10/21    Acute Renal Failure   In the setting of Sepsis; Improving  Renally dose meds and monitor BMP and electrolytes     Metabolic Encephalopathy   Improving  Due to Sepsis       Thrombocytopenia  In the setting of Sepsis  Avoid LMWH  Hematology (Dr. Madera)    HTN  Hold anti-HTN meds    BPH  Hold Flomax    Diet  Regular    DVT Prophylaxis  SCD    Disposition  Full Code/Inpatient  Discharge planning pending hospital course   CC Time spent >60 minutes   Downgrade out of [facility]

## 2021-05-11 NOTE — PROGRESS NOTE ADULT - ASSESSMENT
81M with HTN, BPH who presents with hematuria and weakness.  Patient had a cystoscopy about 2 weeks ago (for evaluation for chronic bacteriuria) and since then has had intermittent hematuria.  Was given 3 days of an abx post-procedure.  Then today (Thursday), patient noted gross blood from his urine.  Patient also started to have chills, fevers, and nausea.  No pain.  No recent sick contacts.   COVID vaccine with Pfizer was done >1 month ago.  No other complaints.  Brought here for further evaluation.  In the ED, patient's triage VS were /65  HR 97  RR 24, 95%, T 101.2F.  Physical exam revealed that he was becoming altered (did not know where he was).  Labs were significant for leukopenia at WBC 0.78, Cr 1.34, lactate 2.3, and UA with +nitrite, mod LE with 0-2 WBC, large blood with >50 RBC, occasional bacteria.  Was started on ceftriaxone empirically.  CT renal showed possible cystitis, multiple bladder diverticula with likely layering calcifications within the posterior diverticula and along the posterior bladder wall, fullness in left renal pelvis.  Patient also had a head CT for AMS.  However, niece said that the patient was more lucid after the fluids and is no longer altered and responding accordingly.  Patient is being admitted on 5/7/21 for sepsis 2/2 UTI/ infection.    Hematology was consulted by Dr Zayas for thrombocytopenia while i was rounding at [facility] on 5/8/21 and i saw patient in few minutes consult was requested on 5/8/21, this am patient became hypotensive, was given iv fluids, placed on levophed, patient in septic shock possible and is on antibiotics as per id, patient was placed on bipap as per pulmonary and for possible intubation--final decision as per pulmonary/critical care  septic shock possible  hypotension  resp distress--pul following, on bipap at time of consult--possible intubation--final decision per critical care/pull  renal failure and worsening renal failure  thrombocytopenia--hem was consulted  anemia    RECOMMENDATION:  Anemia--likely multifactorial--renal/dilutional/nutritional  iron panel--low iron/saturation, ferritin 374  creatinine 2.8 (5/8)----1.59 (5/10)  no evidence of hemolysis  nl b12/folate  hb is at 10.7 today, was at 10.6 yesterday  as patient with low iron/transferrin saturation and looking for a rapid response, pt with renal insufficiency and possible poor po iron absorption with renal insufficiency patient is on iv iron trial, day 2 (2/3) today  monitor serial h/h  Thrombocytopenia--likely multifactorial, more likely from sepsis/ increased consumption  nl b12/folate  nl fibrinogen  platelets at 44 k today, was at 48 k yesterday  monitor serial platelets  smear reviewed  no splenomegaly on earlier abd us  urine retention/uti/cystitis possible--s/p arrington--clear urine--urology is following, h/o recent s/p cystoscopy  renal failure--nephrology is following, creatinine slow improving  septic shock, seen by id and is on antibiotics as per id  s/p resp failure--s/p intubation-- s/p extubation, follow pulmonary  gi/dvtp--scd  d/w pt at bedside

## 2021-05-11 NOTE — PROGRESS NOTE ADULT - ASSESSMENT
The patient is an 81 year old male with a history of HTN, HL, BPH who was admitted with urosepsis, strep bacteremia, course c/b septic shock and respiratory failure.    Plan:  - Blood cultures positive for strep anginosus - unlikely cause of endocarditis  - Echo with normal LV systolic function, aortic valve sclerosis, right sided dilatation with reduced function  - No obvious endocarditis on TTE but limited by calcified aortic valve  - Last cultures NGTD  - If cultures remain positive, can evaluate further with ERICK  - CT chest with pulm edema and small pleural effusions - likely due to prior IV fluids  - Can dose furosemide prn  - BNP improving  - On zosyn  - Lower midodrine to 5 mg tid

## 2021-05-11 NOTE — PROGRESS NOTE ADULT - SUBJECTIVE AND OBJECTIVE BOX
ANN MARIE NAVARRETE is a 81yMale , patient examined and chart reviewed.     INTERVAL HPI/ OVERNIGHT EVENTS:  Weak but more awake alert.   Denies pain.    Past Medical History--  PAST MEDICAL & SURGICAL HISTORY:  Hypertension  Hyperlipidemia  BPH (benign prostatic hyperplasia)  History of prostate surgery      For details regarding the patient's social history, family history, and other miscellaneous elements, please refer the initial infectious diseases consultation and/or the admitting history and physical examination for this admission.      ROS:  Unable to obtain due to : pt's condition      Current inpatient medications :    ANTIBIOTICS/RELEVANT:  piperacillin/tazobactam IVPB.. 3.375 Gram(s) IV Intermittent every 8 hours    MEDICATIONS  (STANDING):  ALBUTerol    0.083% 2.5 milliGRAM(s) Nebulizer every 8 hours  chlorhexidine 0.12% Liquid 15 milliLiter(s) Oral Mucosa two times a day  chlorhexidine 2% Cloths 1 Application(s) Topical daily  dorzolamide 2%/timolol 0.5% Ophthalmic Solution 1 Drop(s) Both EYES two times a day  iron sucrose Injectable 100 milliGRAM(s) IV Push every 24 hours  latanoprost 0.005% Ophthalmic Solution 1 Drop(s) Both EYES at bedtime  midodrine 5 milliGRAM(s) Oral every 8 hours  pantoprazole  Injectable 40 milliGRAM(s) IV Push daily    MEDICATIONS  (PRN):  acetaminophen   Tablet .. 650 milliGRAM(s) Oral every 6 hours PRN Temp greater or equal to 38C (100.4F), Mild Pain (1 - 3)      Objective:  ICU Vital Signs Last 24 Hrs  T(C): 36.3 (11 May 2021 12:30), Max: 37.1 (11 May 2021 00:05)  T(F): 97.4 (11 May 2021 12:30), Max: 98.7 (11 May 2021 00:05)  HR: 74 (11 May 2021 13:00) (58 - 76)  BP: 134/70 (11 May 2021 13:00) (103/79 - 144/81)  BP(mean): 88 (11 May 2021 13:00) (75 - 99)  RR: 19 (11 May 2021 13:00) (12 - 30)  SpO2: 100% (11 May 2021 13:00) (93% - 100%)      Physical Exam:  GEN: weak  HEENT: normocephalic and atraumatic.   NECK: Supple.   LUNGS: Decreased to auscultation.  HEART: Regular rate and rhythm without murmur.  ABDOMEN: Soft, nontender, and nondistended.  Positive bowel sounds.    EXTREMITIES: +edema.  NEUROLOGIC: lethargic      LABS:                        10.7   29.80 )-----------( 44       ( 11 May 2021 07:06 )             33.0   05-11    142  |  111<H>  |  39<H>  ----------------------------<  117<H>  4.1   |  25  |  1.38<H>    Ca    8.0<L>      11 May 2021 07:19  Phos  2.4     05-10  Mg     2.3     05-10    TPro  5.2<L>  /  Alb  2.0<L>  /  TBili  1.6<H>  /  DBili  x   /  AST  57<H>  /  ALT  83<H>  /  AlkPhos  445<H>  11    Urinalysis Basic - ( 06 May 2021 22:26 )    Color: Sabiha / Appearance: Turbid / S.015 / pH: x  Gluc: x / Ketone: Negative  / Bili: Negative / Urobili: Negative mg/dL   Blood: x / Protein: 100 mg/dL / Nitrite: Positive   Leuk Esterase: Moderate / RBC: >50 /HPF / WBC 0-2   Sq Epi: x / Non Sq Epi: Occasional / Bacteria: Occasional      ABG - ( 08 May 2021 09:09 )  pH, Arterial: 7.22  pH, Blood: x     /  pCO2: 38    /  pO2: 72    / HCO3: 15    / Base Excess: -11.2 /  SaO2: 92         Urinalysis Basic - ( 06 May 2021 22:26 )    Color: Sabiha / Appearance: Turbid / S.015 / pH: x  Gluc: x / Ketone: Negative  / Bili: Negative / Urobili: Negative mg/dL   Blood: x / Protein: 100 mg/dL / Nitrite: Positive   Leuk Esterase: Moderate / RBC: >50 /HPF / WBC 0-2   Sq Epi: x / Non Sq Epi: Occasional / Bacteria: Occasional    MICROBIOLOGY:    Culture - Sputum (collected 09 May 2021 20:53)  Source: .Sputum Sputum  Gram Stain (09 May 2021 22:52):    Rare polymorphonuclear leukocytes per low power field    Few Squamous epithelial cells per low power field    No organisms seen per oil power field  Preliminary Report (10 May 2021 16:43):    Normal Respiratory Mckinley present    Culture - Blood (collected 08 May 2021 22:09)  Source: .Blood Blood-Venous  Preliminary Report (09 May 2021 23:01):    No growth to date.    Culture - Blood (collected 08 May 2021 22:09)  Source: .Blood Blood-Peripheral  Preliminary Report (09 May 2021 23:01):    No growth to date.    Culture - Urine (collected 08 May 2021 22:00)  Source: .Urine Catheterized  Final Report (09 May 2021 17:11):    No growth    Culture - Urine (collected 07 May 2021 13:22)  Source: .Urine Clean Catch (Midstream)  Final Report (08 May 2021 23:17):    Normal Urogenital mckinley present    Culture - Blood (collected 07 May 2021 13:22)  Source: .Blood Blood-Peripheral  Gram Stain (09 May 2021 09:40):    Growth in aerobic bottle: Gram Positive Cocci in Pairs and Chains    Growth in anaerobic bottle: Gram positive cocci in pairs  Preliminary Report (09 May 2021 09:40):    Growth in aerobic bottle: Gram Positive Cocci in Pairs and Chains    Growth in anaerobic bottle: Gram positive cocci in pairs    ***Blood Panel PCR results on this specimen are available    approximately 3 hours after the Gram stain result.***    Gram stain, PCR, and/or culture results may not always    correspond due to difference in methodologies.    ************************************************************    This PCR assay was performed by multiplex PCR. This    Assay tests for 66 bacterial and resistance gene targets.    Please refer to the [facility] test directory    at [email or web address] for details.  Organism: Blood Culture PCR (08 May 2021 18:03)  Organism: Blood Culture PCR (08 May 2021 18:03)      -  Streptococcus anginosus group: Detec      Method Type: PCR    Culture - Blood (collected 07 May 2021 13:22)  Source: .Blood Blood-Peripheral  Gram Stain (08 May 2021 22:10):    Growth in anaerobic bottle: Gram Positive Cocci in Pairs and Chains  Preliminary Report (08 May 2021 22:10):    Growth in anaerobic bottle: Gram Positive Cocci in Pairs and Chains      RADIOLOGY & ADDITIONAL STUDIES:    EXAM:  XR CHEST PORTABLE URGENT 1V                          EXAM:  XR CHEST PORTABLE ROUTINE 1V                                  PROCEDURE DATE:  2021          INTERPRETATION:  Portable chest radiograph    CLINICAL INFORMATION: Sepsis    TECHNIQUE:  Portable  AP view of the chest was obtained.    COMPARISON: 3/2/2020 available for review.    FINDINGS:    The lungs show mild perihilar and RIGHT greater than LEFT basilar diffuse airspace disease with bronchial wall thickening  concerning for bronchiectasis. No large airspace consolidation or effusion.  No pneumothorax.    There is mild cardiomegaly.    Healed RIGHT mid clavicle fracture deformity.      IMPRESSION:   Bilateral mild diffuse airspace disease with bronchial wall thickening....    FOLLOW-UP AP PORTABLE CHEST RADIOGRAPH 2021 5:26 PM:  No interval change. Bilateral diffuse infiltrates with bronchial wall thickening.      RADIOLOGY:    EXAM:  CT PELVIS ONLY                          EXAM:  CT ABDOMEN ONLY                          EXAM:  CT CHEST                                  PROCEDURE DATE:  2021          INTERPRETATION:  CLINICAL INFORMATION: Septic shock with nausea andchills    COMPARISON: Same day chest x-ray, CT abdomen pelvis 2021    CONTRAST/COMPLICATIONS:  IV Contrast: NONE  0 cc administered   0 cc discarded  Oral Contrast: NONE  Complications: None reported at time of study completion    PROCEDURE:  CT of the Chest, Abdomen and Pelvis was performed.  Sagittal and coronal reformats were performed.    FINDINGS:  CHEST:  LUNGS AND LARGE AIRWAYS: The tip of the endotracheal tube is above the contreras. The central airways are patent. Pulmonary edema and bibasilar atelectasis.  PLEURA: Small bilateral effusions.  VESSELS: Normal caliber aorta. Right IJ line tip in SVC.  HEART: Normal heart size. No pericardial effusion. Coronary artery calcifications are present.  MEDIASTINUM AND KARLA: No adenopathy.  CHEST WALL AND LOWER NECK: No masses.    ABDOMEN AND PELVIS:  LIVER: Normal.  BILE DUCTS: Nondilated.  GALLBLADDER: Sludge and diffuse nonspecific wall edema.  SPLEEN: Normal.  PANCREAS: Normal.  ADRENALS: Normal.  KIDNEYS/URETERS: No hydronephrosis orurinary tract calculi.    BLADDER: Collapsed around a Arrington catheter balloon. Multiple stones within the right and left posterior diverticula.  REPRODUCTIVE ORGANS: Nonenlarged.    BOWEL: No bowel-related abnormality.  PERITONEUM: No free air or ascites. No collection.  VESSELS: Aortoiliac atherosclerosis without aneurysm.  RETROPERITONEUM/LYMPH NODES: No adenopathy.  ABDOMINAL WALL: Normal.  BONES: No aggressive lesion.    IMPRESSION:  *  Pulmonary edema and small bilateral pleural effusions.  * Multiple small stones layering within urinary bladder diverticula.      Assessment :   81M with HTN, BPH recent cystoscopy about 2 weeks ago (for evaluation for chronic bacteriuria) admitted with acute cystitis with hematuria complicated by Strep septicemia, severe sepsis with septic shock and acute respiratory failure.   Intubated and on pressors 21 Extubated 5/10/21, off pressors.  CHF   Echo noted  HORACIO with AUR- arrington placed by urology 21  WBC rising and with abn LFTs- previous u/s and CT AP nonspecific findings- Sludge and diffuse nonspecific wall edema  Clinically stable    Plan :   Cont Zosyn  Fu repeat cultures  Trend LFTs  Consider GI eval  Trend temps and cbc  Arrington per urology  Asp precautions    Continue with present regiment.  Appropriate use of antibiotics and adverse effects reviewed.        Critical care time greater then 35 minutes reviewing notes, labs data/ imaging , discussion with multidisciplinary team.    Thank you for allowing me to participate in care of your patient .        Frank Hernandez MD  Infectious Disease  [phone]

## 2021-05-12 LAB
ALBUMIN SERPL ELPH-MCNC: 1.9 G/DL — LOW (ref 3.3–5)
ALP SERPL-CCNC: 519 U/L — HIGH (ref 30–120)
ALT FLD-CCNC: 85 U/L DA — HIGH (ref 10–60)
ANION GAP SERPL CALC-SCNC: 6 MMOL/L — SIGNIFICANT CHANGE UP (ref 5–17)
AST SERPL-CCNC: 55 U/L — HIGH (ref 10–40)
BASOPHILS # BLD AUTO: 0.1 K/UL — SIGNIFICANT CHANGE UP (ref 0–0.2)
BASOPHILS NFR BLD AUTO: 0.5 % — SIGNIFICANT CHANGE UP (ref 0–2)
BILIRUB SERPL-MCNC: 1.1 MG/DL — SIGNIFICANT CHANGE UP (ref 0.2–1.2)
BUN SERPL-MCNC: 39 MG/DL — HIGH (ref 7–23)
CALCIUM SERPL-MCNC: 8.1 MG/DL — LOW (ref 8.4–10.5)
CHLORIDE SERPL-SCNC: 108 MMOL/L — SIGNIFICANT CHANGE UP (ref 96–108)
CO2 SERPL-SCNC: 27 MMOL/L — SIGNIFICANT CHANGE UP (ref 22–31)
CREAT SERPL-MCNC: 1.31 MG/DL — HIGH (ref 0.5–1.3)
EOSINOPHIL # BLD AUTO: 0.11 K/UL — SIGNIFICANT CHANGE UP (ref 0–0.5)
EOSINOPHIL NFR BLD AUTO: 0.5 % — SIGNIFICANT CHANGE UP (ref 0–6)
GLUCOSE SERPL-MCNC: 92 MG/DL — SIGNIFICANT CHANGE UP (ref 70–99)
HCT VFR BLD CALC: 34.1 % — LOW (ref 39–50)
HGB BLD-MCNC: 11.4 G/DL — LOW (ref 13–17)
IMM GRANULOCYTES NFR BLD AUTO: 2.8 % — HIGH (ref 0–1.5)
LYMPHOCYTES # BLD AUTO: 13.5 % — SIGNIFICANT CHANGE UP (ref 13–44)
LYMPHOCYTES # BLD AUTO: 2.98 K/UL — SIGNIFICANT CHANGE UP (ref 1–3.3)
MAGNESIUM SERPL-MCNC: 2 MG/DL — SIGNIFICANT CHANGE UP (ref 1.6–2.6)
MCHC RBC-ENTMCNC: 29.1 PG — SIGNIFICANT CHANGE UP (ref 27–34)
MCHC RBC-ENTMCNC: 33.4 GM/DL — SIGNIFICANT CHANGE UP (ref 32–36)
MCV RBC AUTO: 87 FL — SIGNIFICANT CHANGE UP (ref 80–100)
MONOCYTES # BLD AUTO: 2.31 K/UL — HIGH (ref 0–0.9)
MONOCYTES NFR BLD AUTO: 10.5 % — SIGNIFICANT CHANGE UP (ref 2–14)
NEUTROPHILS # BLD AUTO: 15.93 K/UL — HIGH (ref 1.8–7.4)
NEUTROPHILS NFR BLD AUTO: 72.2 % — SIGNIFICANT CHANGE UP (ref 43–77)
NRBC # BLD: 0 /100 WBCS — SIGNIFICANT CHANGE UP (ref 0–0)
PHOSPHATE SERPL-MCNC: 2 MG/DL — LOW (ref 2.5–4.5)
PLATELET # BLD AUTO: 47 K/UL — LOW (ref 150–400)
POTASSIUM SERPL-MCNC: 3.5 MMOL/L — SIGNIFICANT CHANGE UP (ref 3.5–5.3)
POTASSIUM SERPL-SCNC: 3.5 MMOL/L — SIGNIFICANT CHANGE UP (ref 3.5–5.3)
PROT SERPL-MCNC: 5.3 G/DL — LOW (ref 6–8.3)
RBC # BLD: 3.92 M/UL — LOW (ref 4.2–5.8)
RBC # FLD: 13.1 % — SIGNIFICANT CHANGE UP (ref 10.3–14.5)
SODIUM SERPL-SCNC: 141 MMOL/L — SIGNIFICANT CHANGE UP (ref 135–145)
WBC # BLD: 22.05 K/UL — HIGH (ref 3.8–10.5)
WBC # FLD AUTO: 22.05 K/UL — HIGH (ref 3.8–10.5)

## 2021-05-12 PROCEDURE — 78226 HEPATOBILIARY SYSTEM IMAGING: CPT | Mod: 26

## 2021-05-12 PROCEDURE — 99233 SBSQ HOSP IP/OBS HIGH 50: CPT

## 2021-05-12 RX ORDER — SODIUM,POTASSIUM PHOSPHATES 278-250MG
1 POWDER IN PACKET (EA) ORAL EVERY 6 HOURS
Refills: 0 | Status: COMPLETED | OUTPATIENT
Start: 2021-05-12 | End: 2021-05-12

## 2021-05-12 RX ORDER — FERROUS SULFATE 325(65) MG
325 TABLET ORAL DAILY
Refills: 0 | Status: DISCONTINUED | OUTPATIENT
Start: 2021-05-13 | End: 2021-05-14

## 2021-05-12 RX ADMIN — DORZOLAMIDE HYDROCHLORIDE TIMOLOL MALEATE 1 DROP(S): 20; 5 SOLUTION/ DROPS OPHTHALMIC at 05:14

## 2021-05-12 RX ADMIN — PIPERACILLIN AND TAZOBACTAM 25 GRAM(S): 4; .5 INJECTION, POWDER, LYOPHILIZED, FOR SOLUTION INTRAVENOUS at 13:48

## 2021-05-12 RX ADMIN — ALBUTEROL 2.5 MILLIGRAM(S): 90 AEROSOL, METERED ORAL at 23:33

## 2021-05-12 RX ADMIN — ALBUTEROL 2.5 MILLIGRAM(S): 90 AEROSOL, METERED ORAL at 15:03

## 2021-05-12 RX ADMIN — ALBUTEROL 2.5 MILLIGRAM(S): 90 AEROSOL, METERED ORAL at 07:49

## 2021-05-12 RX ADMIN — PIPERACILLIN AND TAZOBACTAM 25 GRAM(S): 4; .5 INJECTION, POWDER, LYOPHILIZED, FOR SOLUTION INTRAVENOUS at 05:14

## 2021-05-12 RX ADMIN — PANTOPRAZOLE SODIUM 40 MILLIGRAM(S): 20 TABLET, DELAYED RELEASE ORAL at 13:47

## 2021-05-12 RX ADMIN — MIDODRINE HYDROCHLORIDE 5 MILLIGRAM(S): 2.5 TABLET ORAL at 05:14

## 2021-05-12 RX ADMIN — CHLORHEXIDINE GLUCONATE 1 APPLICATION(S): 213 SOLUTION TOPICAL at 13:48

## 2021-05-12 RX ADMIN — PIPERACILLIN AND TAZOBACTAM 25 GRAM(S): 4; .5 INJECTION, POWDER, LYOPHILIZED, FOR SOLUTION INTRAVENOUS at 22:25

## 2021-05-12 RX ADMIN — LATANOPROST 1 DROP(S): 0.05 SOLUTION/ DROPS OPHTHALMIC; TOPICAL at 22:25

## 2021-05-12 RX ADMIN — DORZOLAMIDE HYDROCHLORIDE TIMOLOL MALEATE 1 DROP(S): 20; 5 SOLUTION/ DROPS OPHTHALMIC at 18:15

## 2021-05-12 RX ADMIN — IRON SUCROSE 100 MILLIGRAM(S): 20 INJECTION, SOLUTION INTRAVENOUS at 18:15

## 2021-05-12 RX ADMIN — Medication 1 TABLET(S): at 18:15

## 2021-05-12 RX ADMIN — CHLORHEXIDINE GLUCONATE 15 MILLILITER(S): 213 SOLUTION TOPICAL at 13:48

## 2021-05-12 RX ADMIN — Medication 1 TABLET(S): at 23:57

## 2021-05-12 NOTE — PROGRESS NOTE ADULT - SUBJECTIVE AND OBJECTIVE BOX
ANN MARIE NAVARRETE is a 81yMale , patient examined and chart reviewed.     INTERVAL HPI/ OVERNIGHT EVENTS:  Weak. awake alert. In chair.  Denies pain.    Past Medical History--  PAST MEDICAL & SURGICAL HISTORY:  Hypertension  Hyperlipidemia  BPH (benign prostatic hyperplasia)  History of prostate surgery      For details regarding the patient's social history, family history, and other miscellaneous elements, please refer the initial infectious diseases consultation and/or the admitting history and physical examination for this admission.      ROS:  Unable to obtain due to : pt's condition      Current inpatient medications :    ANTIBIOTICS/RELEVANT:  piperacillin/tazobactam IVPB.. 3.375 Gram(s) IV Intermittent every 8 hours    MEDICATIONS  (STANDING):  ALBUTerol    0.083% 2.5 milliGRAM(s) Nebulizer every 8 hours  chlorhexidine 0.12% Liquid 15 milliLiter(s) Oral Mucosa two times a day  chlorhexidine 2% Cloths 1 Application(s) Topical daily  dorzolamide 2%/timolol 0.5% Ophthalmic Solution 1 Drop(s) Both EYES two times a day  latanoprost 0.005% Ophthalmic Solution 1 Drop(s) Both EYES at bedtime  pantoprazole  Injectable 40 milliGRAM(s) IV Push daily  piperacillin/tazobactam IVPB.. 3.375 Gram(s) IV Intermittent every 8 hours  potassium phosphate / sodium phosphate Tablet (K-PHOS No. 2) 1 Tablet(s) Oral every 6 hours    MEDICATIONS  (PRN):  acetaminophen   Tablet .. 650 milliGRAM(s) Oral every 6 hours PRN Temp greater or equal to 38C (100.4F), Mild Pain (1 - 3)        Objective:  ICU Vital Signs Last 24 Hrs  T(C): 36.9 (12 May 2021 19:23), Max: 37.2 (12 May 2021 00:05)  T(F): 98.5 (12 May 2021 19:23), Max: 98.9 (12 May 2021 00:05)  HR: 64 (12 May 2021 20:10) (52 - 81)  BP: 125/71 (12 May 2021 20:10) (117/64 - 145/73)  BP(mean): 86 (12 May 2021 20:10) (79 - 96)  RR: 17 (12 May 2021 20:10) (12 - 20)  SpO2: 100% (12 May 2021 20:10) (97% - 100%)      Physical Exam:  GEN: weak  HEENT: normocephalic and atraumatic.   NECK: Supple.   LUNGS: Decreased to auscultation.  HEART: Regular rate and rhythm without murmur.  ABDOMEN: Soft, nontender, and nondistended.  Positive bowel sounds.    EXTREMITIES: +edema.  NEUROLOGIC: lethargic      LABS:                                   11.4   22.05 )-----------( 47       ( 12 May 2021 06:10 )             34.1   05-12    141  |  108  |  39<H>  ----------------------------<  92  3.5   |  27  |  1.31<H>    Ca    8.1<L>      12 May 2021 06:10  Phos  2.0       Mg     2.0         TPro  5.3<L>  /  Alb  1.9<L>  /  TBili  1.1  /  DBili  x   /  AST  55<H>  /  ALT  85<H>  /  AlkPhos  519<H>  12      Urinalysis Basic - ( 06 May 2021 22:26 )    Color: Sabiha / Appearance: Turbid / S.015 / pH: x  Gluc: x / Ketone: Negative  / Bili: Negative / Urobili: Negative mg/dL   Blood: x / Protein: 100 mg/dL / Nitrite: Positive   Leuk Esterase: Moderate / RBC: >50 /HPF / WBC 0-2   Sq Epi: x / Non Sq Epi: Occasional / Bacteria: Occasional      ABG - ( 08 May 2021 09:09 )  pH, Arterial: 7.22  pH, Blood: x     /  pCO2: 38    /  pO2: 72    / HCO3: 15    / Base Excess: -11.2 /  SaO2: 92         Urinalysis Basic - ( 06 May 2021 22:26 )    Color: Sabiha / Appearance: Turbid / S.015 / pH: x  Gluc: x / Ketone: Negative  / Bili: Negative / Urobili: Negative mg/dL   Blood: x / Protein: 100 mg/dL / Nitrite: Positive   Leuk Esterase: Moderate / RBC: >50 /HPF / WBC 0-2   Sq Epi: x / Non Sq Epi: Occasional / Bacteria: Occasional    MICROBIOLOGY:    Culture - Sputum (collected 09 May 2021 20:53)  Source: .Sputum Sputum  Gram Stain (09 May 2021 22:52):    Rare polymorphonuclear leukocytes per low power field    Few Squamous epithelial cells per low power field    No organisms seen per oil power field  Preliminary Report (10 May 2021 16:43):    Normal Respiratory Mckinley present    Culture - Blood (collected 08 May 2021 22:09)  Source: .Blood Blood-Venous  Preliminary Report (09 May 2021 23:01):    No growth to date.    Culture - Blood (collected 08 May 2021 22:09)  Source: .Blood Blood-Peripheral  Preliminary Report (09 May 2021 23:01):    No growth to date.    Culture - Urine (collected 08 May 2021 22:00)  Source: .Urine Catheterized  Final Report (09 May 2021 17:11):    No growth    Culture - Urine (collected 07 May 2021 13:22)  Source: .Urine Clean Catch (Midstream)  Final Report (08 May 2021 23:17):    Normal Urogenital mckinley present    Culture - Blood (collected 07 May 2021 13:22)  Source: .Blood Blood-Peripheral  Gram Stain (09 May 2021 09:40):    Growth in aerobic bottle: Gram Positive Cocci in Pairs and Chains    Growth in anaerobic bottle: Gram positive cocci in pairs  Preliminary Report (09 May 2021 09:40):    Growth in aerobic bottle: Gram Positive Cocci in Pairs and Chains    Growth in anaerobic bottle: Gram positive cocci in pairs    ***Blood Panel PCR results on this specimen are available    approximately 3 hours after the Gram stain result.***    Gram stain, PCR, and/or culture results may not always    correspond due to difference in methodologies.    ************************************************************    This PCR assay was performed by multiplex PCR. This    Assay tests for 66 bacterial and resistance gene targets.    Please refer to the [email or web address] test directory    at [email or web address] for details.  Organism: Blood Culture PCR (08 May 2021 18:03)  Organism: Blood Culture PCR (08 May 2021 18:03)      -  Streptococcus anginosus group: Detec      Method Type: PCR    Culture - Blood (collected 07 May 2021 13:22)  Source: .Blood Blood-Peripheral  Gram Stain (08 May 2021 22:10):    Growth in anaerobic bottle: Gram Positive Cocci in Pairs and Chains  Preliminary Report (08 May 2021 22:10):    Growth in anaerobic bottle: Gram Positive Cocci in Pairs and Chains      RADIOLOGY & ADDITIONAL STUDIES:    EXAM:  XR CHEST PORTABLE URGENT 1V                          EXAM:  XR CHEST PORTABLE ROUTINE 1V                                  PROCEDURE DATE:  2021          INTERPRETATION:  Portable chest radiograph    CLINICAL INFORMATION: Sepsis    TECHNIQUE:  Portable  AP view of the chest was obtained.    COMPARISON: 3/2/2020 available for review.    FINDINGS:    The lungs show mild perihilar and RIGHT greater than LEFT basilar diffuse airspace disease with bronchial wall thickening  concerning for bronchiectasis. No large airspace consolidation or effusion.  No pneumothorax.    There is mild cardiomegaly.    Healed RIGHT mid clavicle fracture deformity.      IMPRESSION:   Bilateral mild diffuse airspace disease with bronchial wall thickening....    FOLLOW-UP AP PORTABLE CHEST RADIOGRAPH 2021 5:26 PM:  No interval change. Bilateral diffuse infiltrates with bronchial wall thickening.      RADIOLOGY:    EXAM:  CT PELVIS ONLY                          EXAM:  CT ABDOMEN ONLY                          EXAM:  CT CHEST                                  PROCEDURE DATE:  2021          INTERPRETATION:  CLINICAL INFORMATION: Septic shock with nausea andchills    COMPARISON: Same day chest x-ray, CT abdomen pelvis 2021    CONTRAST/COMPLICATIONS:  IV Contrast: NONE  0 cc administered   0 cc discarded  Oral Contrast: NONE  Complications: None reported at time of study completion    PROCEDURE:  CT of the Chest, Abdomen and Pelvis was performed.  Sagittal and coronal reformats were performed.    FINDINGS:  CHEST:  LUNGS AND LARGE AIRWAYS: The tip of the endotracheal tube is above the contreras. The central airways are patent. Pulmonary edema and bibasilar atelectasis.  PLEURA: Small bilateral effusions.  VESSELS: Normal caliber aorta. Right IJ line tip in SVC.  HEART: Normal heart size. No pericardial effusion. Coronary artery calcifications are present.  MEDIASTINUM AND KARLA: No adenopathy.  CHEST WALL AND LOWER NECK: No masses.    ABDOMEN AND PELVIS:  LIVER: Normal.  BILE DUCTS: Nondilated.  GALLBLADDER: Sludge and diffuse nonspecific wall edema.  SPLEEN: Normal.  PANCREAS: Normal.  ADRENALS: Normal.  KIDNEYS/URETERS: No hydronephrosis orurinary tract calculi.    BLADDER: Collapsed around a Arrington catheter balloon. Multiple stones within the right and left posterior diverticula.  REPRODUCTIVE ORGANS: Nonenlarged.    BOWEL: No bowel-related abnormality.  PERITONEUM: No free air or ascites. No collection.  VESSELS: Aortoiliac atherosclerosis without aneurysm.  RETROPERITONEUM/LYMPH NODES: No adenopathy.  ABDOMINAL WALL: Normal.  BONES: No aggressive lesion.    IMPRESSION:  *  Pulmonary edema and small bilateral pleural effusions.  * Multiple small stones layering within urinary bladder diverticula.      Assessment :   81M with HTN, BPH recent cystoscopy about 2 weeks ago (for evaluation for chronic bacteriuria) admitted with acute cystitis with hematuria complicated by Strep septicemia, severe sepsis with septic shock and acute respiratory failure.   Intubated and on pressors 21 Extubated 5/10/21, off pressors.  CHF   Echo noted  HORACIO with AUR- arrington placed by urology 21  WBC rising and with abn LFTs- previous u/s and CT AP nonspecific findings- Sludge and diffuse nonspecific wall edema- seen by GI  WBC downtrending  Making progress    Plan :   Cont Zosyn  Fu repeat cultures  Trend LFTs  Fu HIDA  Trend temps and cbc  Arrington per urology  Asp precautions    Continue with present regiment.  Appropriate use of antibiotics and adverse effects reviewed.        Critical care time greater then 35 minutes reviewing notes, labs data/ imaging , discussion with multidisciplinary team.    Thank you for allowing me to participate in care of your patient .        Frank Hernandez MD  Infectious Disease  [phone]

## 2021-05-12 NOTE — PHYSICAL THERAPY INITIAL EVALUATION ADULT - ADDITIONAL COMMENTS
Pt lives w/ wife on the second floor of an apartment w/ 12 steps to the apartment. Pt reports not using/ owning any assistive devices.

## 2021-05-12 NOTE — PROGRESS NOTE ADULT - SUBJECTIVE AND OBJECTIVE BOX
Chief Complaint: Hematuria, fevers    Interval Events: No events overnight. Sleeping.    Review of Systems:  General: No fevers, chills, weight loss or gain  Skin: No rashes, color changes  Cardiovascular: No chest pain, orthopnea  Respiratory: No shortness of breath, cough  Gastrointestinal: No nausea, abdominal pain  Genitourinary: No incontinence, pain with urination  Musculoskeletal: No pain, swelling, decreased range of motion  Neurological: No headache, weakness  Psychiatric: No depression, anxiety  Endocrine: No weight loss or gain, increased thirst  All other systems are comprehensively negative.    Physical Exam:  Vital Signs Last 24 Hrs  T(C): 37.1 (12 May 2021 04:05), Max: 37.2 (12 May 2021 00:05)  T(F): 98.7 (12 May 2021 04:05), Max: 98.9 (12 May 2021 00:05)  HR: 60 (12 May 2021 07:53) (52 - 74)  BP: 144/76 (12 May 2021 06:00) (105/60 - 144/76)  BP(mean): 96 (12 May 2021 06:00) (75 - 96)  RR: 16 (12 May 2021 06:00) (12 - 24)  SpO2: 98% (12 May 2021 07:53) (94% - 100%)  General: NAD  HEENT: MMM  Neck: No JVD, no carotid bruit  Lungs: CTAB  CV: RRR, nl S1/S2, no M/R/G  Abdomen: S/NT/ND, +BS  Extremities: No LE edema, no cyanosis  Neuro: AAOx3, non-focal  Skin: No rash    Labs:             05-12    141  |  108  |  39<H>  ----------------------------<  92  3.5   |  27  |  1.31<H>    Ca    8.1<L>      12 May 2021 06:10  Phos  2.0     05-12  Mg     2.0     05-12    TPro  5.3<L>  /  Alb  1.9<L>  /  TBili  1.1  /  DBili  x   /  AST  55<H>  /  ALT  85<H>  /  AlkPhos  519<H>  05-12                        11.4   22.05 )-----------( 47       ( 12 May 2021 06:10 )             34.1       Telemetry: Sinus rhythm, PVCs, ventricular triplet

## 2021-05-12 NOTE — PHYSICAL THERAPY INITIAL EVALUATION ADULT - PERTINENT HX OF CURRENT PROBLEM, REHAB EVAL
81M with HTN, BPH who presents with hematuria and weakness. 81M with HTN, BPH who presents with hematuria and weakness, intubated to CPAP 5/8 extubated to CPAP 5/10, Sepsis - Shock - on ABX - ID following - MAP > 60, Cx and biomarkers reviewed, 81M with HTN, BPH who presents with hematuria and weakness, intubated 5/8 extubated to CPAP 5/10, Sepsis - Shock - on ABX

## 2021-05-12 NOTE — PROGRESS NOTE ADULT - SUBJECTIVE AND OBJECTIVE BOX
Patient is a 81y old  Male who presents with a chief complaint of hematuria, nausea, weakness (12 May 2021 09:59)      INTERVAL HPI/OVERNIGHT EVENTS:  Pt is seen and examined.  sitting on a chair comfortably.  c/o feeling weak.  Pain Location & Control:     MEDICATIONS  (STANDING):  ALBUTerol    0.083% 2.5 milliGRAM(s) Nebulizer every 8 hours  chlorhexidine 0.12% Liquid 15 milliLiter(s) Oral Mucosa two times a day  chlorhexidine 2% Cloths 1 Application(s) Topical daily  dorzolamide 2%/timolol 0.5% Ophthalmic Solution 1 Drop(s) Both EYES two times a day  iron sucrose Injectable 100 milliGRAM(s) IV Push every 24 hours  latanoprost 0.005% Ophthalmic Solution 1 Drop(s) Both EYES at bedtime  pantoprazole  Injectable 40 milliGRAM(s) IV Push daily  piperacillin/tazobactam IVPB.. 3.375 Gram(s) IV Intermittent every 8 hours    MEDICATIONS  (PRN):  acetaminophen   Tablet .. 650 milliGRAM(s) Oral every 6 hours PRN Temp greater or equal to 38C (100.4F), Mild Pain (1 - 3)      Allergies    No Known Allergies    Intolerances    Vital Signs Last 24 Hrs  T(C): 36.9 (12 May 2021 13:14), Max: 37.2 (12 May 2021 00:05)  T(F): 98.4 (12 May 2021 13:14), Max: 98.9 (12 May 2021 00:05)  HR: 68 (12 May 2021 13:14) (52 - 68)  BP: 143/76 (12 May 2021 13:14) (117/64 - 145/73)  BP(mean): 95 (12 May 2021 13:14) (79 - 96)  RR: 17 (12 May 2021 13:14) (12 - 24)  SpO2: 100% (12 May 2021 13:14) (97% - 100%)        LABS:                        11.4   22.05 )-----------( 47       ( 12 May 2021 06:10 )             34.1     12 May 2021 06:10    141    |  108    |  39     ----------------------------<  92     3.5     |  27     |  1.31     Ca    8.1        12 May 2021 06:10  Phos  2.0       12 May 2021 06:10  Mg     2.0       12 May 2021 06:10    TPro  5.3    /  Alb  1.9    /  TBili  1.1    /  DBili  x      /  AST  55     /  ALT  85     /  AlkPhos  519    12 May 2021 06:10    PT/INR - ( 11 May 2021 08:44 )   PT: 12.3 sec;   INR: 1.02 ratio         PTT - ( 11 May 2021 08:44 )  PTT:32.0 sec    CAPILLARY BLOOD GLUCOSE      Cultures  Culture Results:   Normal Respiratory Mckinley present (05-09 @ 20:53)  Culture Results:   No growth to date. (05-08 @ 22:09)  Culture Results:   No growth to date. (05-08 @ 22:09)  Culture Results:   No growth (05-08 @ 22:00)  Culture Results:   Growth in anaerobic bottle: Streptococcus anginosus  See previous culture [id number] (05-07 @ 13:22)  Culture Results:   Growth in aerobic bottle: Streptococcus anginosus  Growth in anaerobic bottle: Peptoniphilus harei group "Susceptibilities  not performed"  ***Blood Panel PCR results on this specimen are available  approximately 3 hours after the Gram stain result.***  Gram stain, PCR, and/or culture results may not always  correspond due to difference in methodologies.  ************************************************************  This PCR assay was performed by multiplex PCR. This  Assay tests for 66 bacterial and resistance gene targets.  Please refer to the [email or web address] test directory  at [email or web address] for details. (05-07 @ 13:22)  Culture Results:   Normal Urogenital mckinley present (05-07 @ 13:22)        Culture - Sputum (collected 05-09-21 @ 20:53)  Source: .Sputum Sputum  Gram Stain (05-09-21 @ 22:52):    Rare polymorphonuclear leukocytes per low power field    Few Squamous epithelial cells per low power field    No organisms seen per oil power field  Final Report (05-11-21 @ 19:00):    Normal Respiratory Mckinley present    Culture - Blood (collected 05-08-21 @ 22:09)  Source: .Blood Blood-Venous  Preliminary Report (05-09-21 @ 23:01):    No growth to date.    Culture - Blood (collected 05-08-21 @ 22:09)  Source: .Blood Blood-Peripheral  Preliminary Report (05-09-21 @ 23:01):    No growth to date.    Culture - Urine (collected 05-08-21 @ 22:00)  Source: .Urine Catheterized  Final Report (05-09-21 @ 17:11):    No growth    Culture - Urine (collected 05-07-21 @ 13:22)  Source: .Urine Clean Catch (Midstream)  Final Report (05-08-21 @ 23:17):    Normal Urogenital mckinley present    Culture - Blood (collected 05-07-21 @ 13:22)  Source: .Blood Blood-Peripheral  Gram Stain (05-09-21 @ 09:40):    Growth in aerobic bottle: Gram Positive Cocci in Pairs and Chains    Growth in anaerobic bottle: Gram positive cocci in pairs  Final Report (05-10-21 @ 16:17):    Growth in aerobic bottle: Streptococcus anginosus    Growth in anaerobic bottle: Peptoniphilus harei group "Susceptibilities    not performed"    ***Blood Panel PCR results on this specimen are available    approximately 3 hours after the Gram stain result.***    Gram stain, PCR, and/or culture results may not always    correspond due to difference in methodologies.    ************************************************************    This PCR assay was performed by multiplex PCR. This    Assay tests for 66 bacterial and resistance gene targets.    Please refer to the [email or web address] test directory    at [email or web address] for details.  Organism: Blood Culture PCR  Streptococcus anginosus (05-10-21 @ 16:17)  Organism: Streptococcus anginosus (05-10-21 @ 16:17)      -  Ceftriaxone: S 0.125      -  Penicillin: S 0.047      Method Type: ETEST  Organism: Streptococcus anginosus (05-10-21 @ 16:17)      -  Clindamycin: S      -  Erythromycin: S      -  Levofloxacin: S      -  Vancomycin: S      Method Type: KB  Organism: Blood Culture PCR (05-10-21 @ 16:17)      -  Streptococcus anginosus group: Detec      Method Type: PCR    Culture - Blood (collected 05-07-21 @ 13:22)  Source: .Blood Blood-Peripheral  Gram Stain (05-08-21 @ 22:10):    Growth in anaerobic bottle: Gram Positive Cocci in Pairs and Chains  Final Report (05-09-21 @ 19:22):    Growth in anaerobic bottle: Streptococcus anginosus    See previous culture [id number]        RADIOLOGY & ADDITIONAL TESTS:    Imaging Personally Reviewed:  [ ] YES  [ ] NO    Consultant(s) Notes Reviewed:  [ ] YES  [ ] NO    Care Discussed with Consultants/Other Providers [ x] YES  [ ] NO

## 2021-05-12 NOTE — PROGRESS NOTE ADULT - SUBJECTIVE AND OBJECTIVE BOX
Patient is a 81y old  Male who presents with a chief complaint of hematuria, nausea, weakness (12 May 2021 14:18)       Pt is seen and examined  pt is awake and lying in bed/out of bed to chair  pt seems comfortable and denies any complaints at this time    HPI:  81M with HTN, BPH who presents with hematuria and weakness.  Patient had a cystoscopy about 2 weeks ago (for evaluation for chronic bacteriuria) and since then has had intermittent hematuria.  Was given 3 days of an abx post-procedure.  Then today (Thursday), patient noted gross blood from his urine.  Patient also started to have chills, fevers, and nausea.  No pain.  No recent sick contacts.   COVID vaccine with Pfizer was done >1 month ago.  No other complaints.  Brought here for further evaluation.  In the ED, patient's triage VS were /65  HR 97  RR 24, 95%, T 101.2F.  Physical exam revealed that he was becoming altered (did not know where he was).  Labs were significant for leukopenia at WBC 0.78, Cr 1.34, lactate 2.3, and UA with +nitrite, mod LE with 0-2 WBC, large blood with >50 RBC, occasional bacteria.  Was started on ceftriaxone empirically.  CT renal showed possible cystitis, multiple bladder diverticula with likely layering calcifications within the posterior diverticula and along the posterior bladder wall, fullness in left renal pelvis.  Patient also had a head CT for AMS.  However, niece said that the patient was more lucid after the fluids and is no longer altered and responding accordingly.  Patient is being admitted for sepsis 2/2 UTI/ infection.   (07 May 2021 01:13)         ROS:  Negative except for:    MEDICATIONS  (STANDING):  ALBUTerol    0.083% 2.5 milliGRAM(s) Nebulizer every 8 hours  chlorhexidine 0.12% Liquid 15 milliLiter(s) Oral Mucosa two times a day  chlorhexidine 2% Cloths 1 Application(s) Topical daily  dorzolamide 2%/timolol 0.5% Ophthalmic Solution 1 Drop(s) Both EYES two times a day  iron sucrose Injectable 100 milliGRAM(s) IV Push every 24 hours  latanoprost 0.005% Ophthalmic Solution 1 Drop(s) Both EYES at bedtime  pantoprazole  Injectable 40 milliGRAM(s) IV Push daily  piperacillin/tazobactam IVPB.. 3.375 Gram(s) IV Intermittent every 8 hours    MEDICATIONS  (PRN):  acetaminophen   Tablet .. 650 milliGRAM(s) Oral every 6 hours PRN Temp greater or equal to 38C (100.4F), Mild Pain (1 - 3)      Allergies    No Known Allergies    Intolerances        Vital Signs Last 24 Hrs  T(C): 36.9 (12 May 2021 13:14), Max: 37.2 (12 May 2021 00:05)  T(F): 98.4 (12 May 2021 13:14), Max: 98.9 (12 May 2021 00:05)  HR: 68 (12 May 2021 13:14) (52 - 68)  BP: 143/76 (12 May 2021 13:14) (117/64 - 145/73)  BP(mean): 95 (12 May 2021 13:14) (79 - 96)  RR: 17 (12 May 2021 13:14) (12 - 24)  SpO2: 100% (12 May 2021 13:14) (97% - 100%)    PHYSICAL EXAM  General: adult in NAD  HEENT: clear oropharynx, anicteric sclera, pink conjunctiva  Neck: supple  CV: normal S1/S2 with no murmur rubs or gallops  Lungs: positive air movement b/l ant lungs,clear to auscultation, no wheezes, no rales  Abdomen: soft non-tender non-distended, no hepatosplenomegaly  Ext: no clubbing cyanosis or edema  Skin: no rashes and no petechiae  Neuro: alert and oriented X 4, no focal deficits  LABS:                          11.4   22.05 )-----------( 47       ( 12 May 2021 06:10 )             34.1         Mean Cell Volume : 87.0 fl  Mean Cell Hemoglobin : 29.1 pg  Mean Cell Hemoglobin Concentration : 33.4 gm/dL  Auto Neutrophil # : 15.93 K/uL  Auto Lymphocyte # : 2.98 K/uL  Auto Monocyte # : 2.31 K/uL  Auto Eosinophil # : 0.11 K/uL  Auto Basophil # : 0.10 K/uL  Auto Neutrophil % : 72.2 %  Auto Lymphocyte % : 13.5 %  Auto Monocyte % : 10.5 %  Auto Eosinophil % : 0.5 %  Auto Basophil % : 0.5 %    Serial CBC's  05-12 @ 06:10  Hct-34.1 / Hgb-11.4 / Plat-47 / RBC-3.92 / WBC-22.05          Serial CBC's  05-11 @ 07:06  Hct-33.0 / Hgb-10.7 / Plat-44 / RBC-3.66 / WBC-29.80          Serial CBC's  05-10 @ 06:11  Hct-32.3 / Hgb-10.6 / Plat-48 / RBC-3.57 / WBC-20.59          Serial CBC's  05-09 @ 06:38  Hct-35.2 / Hgb-11.6 / Plat-59 / RBC-3.92 / WBC-20.59          Serial CBC's  05-08 @ 20:28  Hct-34.6 / Hgb-11.3 / Plat-65 / RBC-3.80 / WBC-16.03            05-12    141  |  108  |  39<H>  ----------------------------<  92  3.5   |  27  |  1.31<H>    Ca    8.1<L>      12 May 2021 06:10  Phos  2.0     05-12  Mg     2.0     05-12    TPro  5.3<L>  /  Alb  1.9<L>  /  TBili  1.1  /  DBili  x   /  AST  55<H>  /  ALT  85<H>  /  AlkPhos  519<H>  05-12      PT/INR - ( 11 May 2021 08:44 )   PT: 12.3 sec;   INR: 1.02 ratio         PTT - ( 11 May 2021 08:44 )  PTT:32.0 sec    Ferritin, Serum: 374 ng/mL (05-09-21 @ 00:08)  Vitamin B12, Serum: 1096 pg/mL (05-09-21 @ 00:08)  Folate, Serum: 8.7 ng/mL (05-09-21 @ 00:08)  Iron - Total Binding Capacity.: 187 ug/dL (05-08-21 @ 22:33)  Reticulocyte Percent: 1.3 % (05-08-21 @ 14:06)      Serum Protein Electrophoresis Interp: Normal Electrophoresis Pattern (05-08-21 @ 22:28)            BLOOD SMEAR INTERPRETATION:       RADIOLOGY & ADDITIONAL STUDIES:     Patient is a 81y old  Male who presents with a chief complaint of hematuria, nausea, weakness (12 May 2021 14:18)       Pt is seen and examined in ICU  pt is awake and is sitting in chair  patient is on monitor bed, hr is at 64/min and bp at 138/81  pt seems comfortable and denies any complaints at this time    HPI:  81M with HTN, BPH who presents with hematuria and weakness.  Patient had a cystoscopy about 2 weeks ago (for evaluation for chronic bacteriuria) and since then has had intermittent hematuria.  Was given 3 days of an abx post-procedure.  Then today (Thursday), patient noted gross blood from his urine.  Patient also started to have chills, fevers, and nausea.  No pain.  No recent sick contacts.   COVID vaccine with Pfizer was done >1 month ago.  No other complaints.  Brought here for further evaluation.  In the ED, patient's triage VS were /65  HR 97  RR 24, 95%, T 101.2F.  Physical exam revealed that he was becoming altered (did not know where he was).  Labs were significant for leukopenia at WBC 0.78, Cr 1.34, lactate 2.3, and UA with +nitrite, mod LE with 0-2 WBC, large blood with >50 RBC, occasional bacteria.  Was started on ceftriaxone empirically.  CT renal showed possible cystitis, multiple bladder diverticula with likely layering calcifications within the posterior diverticula and along the posterior bladder wall, fullness in left renal pelvis.  Patient also had a head CT for AMS.  However, niece said that the patient was more lucid after the fluids and is no longer altered and responding accordingly.  Patient is being admitted for sepsis 2/2 UTI/ infection.   (07 May 2021 01:13)         ROS:  as per hpi    MEDICATIONS  (STANDING):  ALBUTerol    0.083% 2.5 milliGRAM(s) Nebulizer every 8 hours  chlorhexidine 0.12% Liquid 15 milliLiter(s) Oral Mucosa two times a day  chlorhexidine 2% Cloths 1 Application(s) Topical daily  dorzolamide 2%/timolol 0.5% Ophthalmic Solution 1 Drop(s) Both EYES two times a day  iron sucrose Injectable 100 milliGRAM(s) IV Push every 24 hours  latanoprost 0.005% Ophthalmic Solution 1 Drop(s) Both EYES at bedtime  pantoprazole  Injectable 40 milliGRAM(s) IV Push daily  piperacillin/tazobactam IVPB.. 3.375 Gram(s) IV Intermittent every 8 hours    MEDICATIONS  (PRN):  acetaminophen   Tablet .. 650 milliGRAM(s) Oral every 6 hours PRN Temp greater or equal to 38C (100.4F), Mild Pain (1 - 3)      Allergies    No Known Allergies    Intolerances        Vital Signs Last 24 Hrs  T(C): 36.9 (12 May 2021 13:14), Max: 37.2 (12 May 2021 00:05)  T(F): 98.4 (12 May 2021 13:14), Max: 98.9 (12 May 2021 00:05)  HR: 68 (12 May 2021 13:14) (52 - 68)  BP: 143/76 (12 May 2021 13:14) (117/64 - 145/73)  BP(mean): 95 (12 May 2021 13:14) (79 - 96)  RR: 17 (12 May 2021 13:14) (12 - 24)  SpO2: 100% (12 May 2021 13:14) (97% - 100%)    PHYSICAL EXAM  General: adult in NAD  HEENT: clear oropharynx, anicteric sclera, pink conjunctiva  Neck: supple  CV: normal S1/S2 with no murmur rubs or gallops  Lungs: positive air movement b/l ant lungs,clear to auscultation, no wheezes, no rales  Abdomen: soft non-tender non-distended, no hepatosplenomegaly  Ext: no clubbing cyanosis or edema  Skin: no rashes and no petechiae  Neuro: alert and oriented X 4, no focal deficits  LABS:                          11.4   22.05 )-----------( 47       ( 12 May 2021 06:10 )             34.1         Mean Cell Volume : 87.0 fl  Mean Cell Hemoglobin : 29.1 pg  Mean Cell Hemoglobin Concentration : 33.4 gm/dL  Auto Neutrophil # : 15.93 K/uL  Auto Lymphocyte # : 2.98 K/uL  Auto Monocyte # : 2.31 K/uL  Auto Eosinophil # : 0.11 K/uL  Auto Basophil # : 0.10 K/uL  Auto Neutrophil % : 72.2 %  Auto Lymphocyte % : 13.5 %  Auto Monocyte % : 10.5 %  Auto Eosinophil % : 0.5 %  Auto Basophil % : 0.5 %    Serial CBC's  05-12 @ 06:10  Hct-34.1 / Hgb-11.4 / Plat-47 / RBC-3.92 / WBC-22.05          Serial CBC's  05-11 @ 07:06  Hct-33.0 / Hgb-10.7 / Plat-44 / RBC-3.66 / WBC-29.80          Serial CBC's  05-10 @ 06:11  Hct-32.3 / Hgb-10.6 / Plat-48 / RBC-3.57 / WBC-20.59          Serial CBC's  05-09 @ 06:38  Hct-35.2 / Hgb-11.6 / Plat-59 / RBC-3.92 / WBC-20.59          Serial CBC's  05-08 @ 20:28  Hct-34.6 / Hgb-11.3 / Plat-65 / RBC-3.80 / WBC-16.03            05-12    141  |  108  |  39<H>  ----------------------------<  92  3.5   |  27  |  1.31<H>    Ca    8.1<L>      12 May 2021 06:10  Phos  2.0     05-12  Mg     2.0     05-12    TPro  5.3<L>  /  Alb  1.9<L>  /  TBili  1.1  /  DBili  x   /  AST  55<H>  /  ALT  85<H>  /  AlkPhos  519<H>  05-12      PT/INR - ( 11 May 2021 08:44 )   PT: 12.3 sec;   INR: 1.02 ratio         PTT - ( 11 May 2021 08:44 )  PTT:32.0 sec    Ferritin, Serum: 374 ng/mL (05-09-21 @ 00:08)  Vitamin B12, Serum: 1096 pg/mL (05-09-21 @ 00:08)  Folate, Serum: 8.7 ng/mL (05-09-21 @ 00:08)  Iron - Total Binding Capacity.: 187 ug/dL (05-08-21 @ 22:33)  Reticulocyte Percent: 1.3 % (05-08-21 @ 14:06)      Serum Protein Electrophoresis Interp: Normal Electrophoresis Pattern (05-08-21 @ 22:28)

## 2021-05-12 NOTE — CHART NOTE - NSCHARTNOTEFT_GEN_A_CORE
Assessment:     81M with HTN, BPH who presents with hematuria and weakness. CT renal showed possible cystitis, multiple bladder diverticula with likely layering calcifications within the posterior diverticula and along the posterior bladder wall, fullness in left renal pelvis.  Pt admitted for sepsis 2/2 UTI/ infection. Pt was intubated 5/8 and just extubated 2 days ago. Current diet soft and pt has been consuming about 50%.   H/H improving, phosphorus trending down, LFT's improving. Noted generalized edema +2, b/l ankles +1 edema   Wt 211.2#  usual around 167#.       Factors impacting intake: [ x] none [ ] nausea  [ ] vomiting [ ] diarrhea [ ] constipation  [ ]chewing problems [ ] swallowing issues  [ ] other:     Diet Presciption: Diet, Soft (05-10-21 @ 13:32)    Intake: 50%    Current Weight: Weight (kg): 76 (05-06 @ 21:50)  % Weight Change  gain 2/2 edema    Pertinent Medications: MEDICATIONS  (STANDING):  ALBUTerol    0.083% 2.5 milliGRAM(s) Nebulizer every 8 hours  chlorhexidine 0.12% Liquid 15 milliLiter(s) Oral Mucosa two times a day  chlorhexidine 2% Cloths 1 Application(s) Topical daily  dorzolamide 2%/timolol 0.5% Ophthalmic Solution 1 Drop(s) Both EYES two times a day  iron sucrose Injectable 100 milliGRAM(s) IV Push every 24 hours  latanoprost 0.005% Ophthalmic Solution 1 Drop(s) Both EYES at bedtime  pantoprazole  Injectable 40 milliGRAM(s) IV Push daily  piperacillin/tazobactam IVPB.. 3.375 Gram(s) IV Intermittent every 8 hours    MEDICATIONS  (PRN):  acetaminophen   Tablet .. 650 milliGRAM(s) Oral every 6 hours PRN Temp greater or equal to 38C (100.4F), Mild Pain (1 - 3)    Pertinent Labs: 05-12 Na141 mmol/L Glu 92 mg/dL K+ 3.5 mmol/L Cr  1.31 mg/dL<H> BUN 39 mg/dL<H> 05-12 Phos 2.0 mg/dL<L> 05-12 Alb 1.9 g/dL<L>     CAPILLARY BLOOD GLUCOSE        Skin: generalized edema +2, b/l ankles +1 edema    Estimated Needs:   [x ] no change since previous assessment  [ ] recalculated:     Previous Nutrition Diagnosis:   [ ] Inadequate Energy Intake [ ]Inadequate Oral Intake [ ] Excessive Energy Intake   [ ] Underweight [ ] Increased Nutrient Needs [ ] Overweight/Obesity [x] altered nutrition related labs  [ ] Altered GI Function [ ] Unintended Weight Loss [ ] Food & Nutrition Related Knowledge Deficit [ ] Malnutrition     Nutrition Diagnosis is [x ] ongoing  [ ] resolved [ ] not applicable     New Nutrition Diagnosis: [ ] not applicable       Interventions:   Recommend  [ ] Change Diet To:  [ ] Nutrition Supplement  [ ] Nutrition Support  [ x] Other: continue POC    Monitoring and Evaluation:   [x ] PO intake [ x ] Tolerance to diet prescription [ x ] weights [ x ] labs[ x ] follow up per protocol  [ ] other: Assessment:     81M with HTN, BPH who presents with hematuria and weakness. CT renal showed possible cystitis, multiple bladder diverticula with likely layering calcifications within the posterior diverticula and along the posterior bladder wall, fullness in left renal pelvis.  Pt admitted for sepsis 2/2 UTI/ infection. Pt was intubated 5/8 and just extubated 2 days ago. Current diet soft and pt has been consuming about 50%.   H/H improving, phosphorus trending down, LFT's improving. Noted generalized edema +2, b/l ankles +1 edema   Wt 211.2#  usual around 167#.  HIDA scan pending      Factors impacting intake: [ x] none [ ] nausea  [ ] vomiting [ ] diarrhea [ ] constipation  [ ]chewing problems [ ] swallowing issues  [ ] other:     Diet Presciption: Diet, Soft (05-10-21 @ 13:32)    Intake: 50%    Current Weight: Weight (kg): 76 (05-06 @ 21:50)  % Weight Change  gain 2/2 edema    Pertinent Medications: MEDICATIONS  (STANDING):  ALBUTerol    0.083% 2.5 milliGRAM(s) Nebulizer every 8 hours  chlorhexidine 0.12% Liquid 15 milliLiter(s) Oral Mucosa two times a day  chlorhexidine 2% Cloths 1 Application(s) Topical daily  dorzolamide 2%/timolol 0.5% Ophthalmic Solution 1 Drop(s) Both EYES two times a day  iron sucrose Injectable 100 milliGRAM(s) IV Push every 24 hours  latanoprost 0.005% Ophthalmic Solution 1 Drop(s) Both EYES at bedtime  pantoprazole  Injectable 40 milliGRAM(s) IV Push daily  piperacillin/tazobactam IVPB.. 3.375 Gram(s) IV Intermittent every 8 hours    MEDICATIONS  (PRN):  acetaminophen   Tablet .. 650 milliGRAM(s) Oral every 6 hours PRN Temp greater or equal to 38C (100.4F), Mild Pain (1 - 3)    Pertinent Labs: 05-12 Na141 mmol/L Glu 92 mg/dL K+ 3.5 mmol/L Cr  1.31 mg/dL<H> BUN 39 mg/dL<H> 05-12 Phos 2.0 mg/dL<L> 05-12 Alb 1.9 g/dL<L>     CAPILLARY BLOOD GLUCOSE        Skin: generalized edema +2, b/l ankles +1 edema    Estimated Needs:   [x ] no change since previous assessment  [ ] recalculated:     Previous Nutrition Diagnosis:   [ ] Inadequate Energy Intake [ ]Inadequate Oral Intake [ ] Excessive Energy Intake   [ ] Underweight [ ] Increased Nutrient Needs [ ] Overweight/Obesity [x] altered nutrition related labs  [ ] Altered GI Function [ ] Unintended Weight Loss [ ] Food & Nutrition Related Knowledge Deficit [ ] Malnutrition     Nutrition Diagnosis is [x ] ongoing  [ ] resolved [ ] not applicable     New Nutrition Diagnosis: [ ] not applicable       Interventions:   Recommend  [ ] Change Diet To:  [ ] Nutrition Supplement  [ ] Nutrition Support  [ x] Other: continue POC    Monitoring and Evaluation:   [x ] PO intake [ x ] Tolerance to diet prescription [ x ] weights [ x ] labs[ x ] follow up per protocol  [ ] other:

## 2021-05-12 NOTE — PROGRESS NOTE ADULT - ASSESSMENT
ON CPAP overnight - on o2 support - NC - vs noted - LABS reviewed - LFTs remains elevated,   Cr better    81M with HTN, BPH who presents with hematuria and weakness.  extubated to CPAP  Sepsis - Shock - on ABX - ID following - MAP > 60, Cx and biomarkers reviewed,  CKD - HORACIO - s/p IVF - Pressors - Renal Eval  keep MAP > 60, Midodrine 10 mg PO TID - Pressors IV as needed   Serial labs - replete lytes - monitor biomarkers  ID eval noted - pt is on broad ABX  - cx reviewed  I jeremy - mucus impaction - mucus plugging on imaging - Albuterol PRN - Chest PT -   BPH rx regimen  US abd - CT renal stone hunt - CXR - noted  [facility] monitoring - care and supportive measures

## 2021-05-12 NOTE — CONSULT NOTE ADULT - SUBJECTIVE AND OBJECTIVE BOX
[facility]    MD Gabino Perez MD Jaydeep Kadam, MD Faisal Sheikh, DO Jacobo Kelly, NORA Gilliam      Chief Complaint:  Patient is a 81y old  Male who presents with a chief complaint of hematuria, nausea, weakness (12 May 2021 07:42)      HPI:  81M with HTN, BPH who presents with hematuria and weakness.  Patient had a cystoscopy about 2 weeks ago (for evaluation for chronic bacteriuria) and since then has had intermittent hematuria.  Was given 3 days of an abx post-procedure.  Then today (Thursday), patient noted gross blood from his urine.  Patient also started to have chills, fevers, and nausea.  No pain.  No recent sick contacts.   COVID vaccine with Pfizer was done >1 month ago.  No other complaints.  Brought here for further evaluation.  In the ED, patient's triage VS were /65  HR 97  RR 24, 95%, T 101.2F.  Physical exam revealed that he was becoming altered (did not know where he was).  Labs were significant for leukopenia at WBC 0.78, Cr 1.34, lactate 2.3, and UA with +nitrite, mod LE with 0-2 WBC, large blood with >50 RBC, occasional bacteria.  Was started on ceftriaxone empirically.  CT renal showed possible cystitis, multiple bladder diverticula with likely layering calcifications within the posterior diverticula and along the posterior bladder wall, fullness in left renal pelvis.  Patient also had a head CT for AMS.  However, niece said that the patient was more lucid after the fluids and is no longer altered and responding accordingly.  Patient is being admitted for sepsis 2/2 UTI/ infection.      During hospitalization, pt developed LFT abnormalities, Bcx positive for strep anginosus, s/p intubation, on pressor therapy for hypotension, altered MS, and was extubated 5/10.          Allergies:  No Known Allergies      Home Medications:    Hospital Medications:  acetaminophen   Tablet .. 650 milliGRAM(s) Oral every 6 hours PRN  ALBUTerol    0.083% 2.5 milliGRAM(s) Nebulizer every 8 hours  chlorhexidine 0.12% Liquid 15 milliLiter(s) Oral Mucosa two times a day  chlorhexidine 2% Cloths 1 Application(s) Topical daily  dorzolamide 2%/timolol 0.5% Ophthalmic Solution 1 Drop(s) Both EYES two times a day  iron sucrose Injectable 100 milliGRAM(s) IV Push every 24 hours  latanoprost 0.005% Ophthalmic Solution 1 Drop(s) Both EYES at bedtime  pantoprazole  Injectable 40 milliGRAM(s) IV Push daily  piperacillin/tazobactam IVPB.. 3.375 Gram(s) IV Intermittent every 8 hours      PMHX/PSHX:  Hypertension    Hyperlipidemia    BPH (benign prostatic hyperplasia)    History of prostate surgery        Family history:      Social History:     ROS:     unable to assess      PHYSICAL EXAM:     GENERAL:  Appears stated age, well-groomed, well-nourished, no distress  HEENT:  NC/AT,  conjunctivae clear and pink, no thyromegaly, nodules, adenopathy, no JVD, sclera -anicteric  CHEST:  Full & symmetric excursion, no increased effort, breath sounds clear  HEART:  Regular rhythm, S1, S2, no murmur/rub/S3/S4, no abdominal bruit, no edema  ABDOMEN:  Soft, non-tender, non-distended, normoactive bowel sounds  EXT:  no cyanosis, clubbing or edema  SKIN:  No rash/erythema, intact   NEURO:  Alert, oriented, no asterixis, no tremor, no encephalopathy    Vital Signs:  Vital Signs Last 24 Hrs  T(C): 36.8 (12 May 2021 08:56), Max: 37.2 (12 May 2021 00:05)  T(F): 98.2 (12 May 2021 08:56), Max: 98.9 (12 May 2021 00:05)  HR: 58 (12 May 2021 08:00) (52 - 74)  BP: 145/73 (12 May 2021 08:00) (106/60 - 145/73)  BP(mean): 93 (12 May 2021 08:00) (75 - 96)  RR: 14 (12 May 2021 08:00) (12 - 24)  SpO2: 100% (12 May 2021 08:00) (97% - 100%)  Daily     Daily Weight in k.8 (12 May 2021 04:05)    LABS:                        11.4   22.05 )-----------( 47       ( 12 May 2021 06:10 )             34.1     Mean Cell Volume: 87.0 fl (21 @ 06:10)        141  |  108  |  39<H>  ----------------------------<  92  3.5   |  27  |  1.31<H>    Ca    8.1<L>      12 May 2021 06:10  Phos  2.0       Mg     2.0         TPro  5.3<L>  /  Alb  1.9<L>  /  TBili  1.1  /  DBili  x   /  AST  55<H>  /  ALT  85<H>  /  AlkPhos  519<H>      LIVER FUNCTIONS - ( 12 May 2021 06:10 )  Alb: 1.9 g/dL / Pro: 5.3 g/dL / ALK PHOS: 519 U/L / ALT: 85 U/L DA / AST: 55 U/L / GGT: x           PT/INR - ( 11 May 2021 08:44 )   PT: 12.3 sec;   INR: 1.02 ratio         PTT - ( 11 May 2021 08:44 )  PTT:32.0 sec                            11.4   22.05 )-----------( 47       ( 12 May 2021 06:10 )             34.1                         10.7   29.80 )-----------( 44       ( 11 May 2021 07:06 )             33.0                         10.6   20.59 )-----------( 48       ( 10 May 2021 06:11 )             32.3       Imaging:    < from: CT Abdomen No Cont (21 @ 17:59) >  EXAM:  CT PELVIS ONLY                          EXAM:  CT ABDOMEN ONLY                          EXAM:  CT CHEST                                  PROCEDURE DATE:  2021          INTERPRETATION:  CLINICAL INFORMATION: Septic shock with nausea andchills    COMPARISON: Same day chest x-ray, CT abdomen pelvis 2021    CONTRAST/COMPLICATIONS:  IV Contrast: NONE  0 cc administered   0 cc discarded  Oral Contrast: NONE  Complications: None reported at time of study completion    PROCEDURE:  CT of the Chest, Abdomen and Pelvis was performed.  Sagittal and coronal reformats were performed.    FINDINGS:  CHEST:  LUNGS AND LARGE AIRWAYS: The tip of the endotracheal tube is above the contreras. The central airways are patent. Pulmonary edema and bibasilar atelectasis.  PLEURA: Small bilateral effusions.  VESSELS: Normal caliber aorta. Right IJ line tip in SVC.  HEART: Normal heart size. No pericardial effusion. Coronary artery calcifications are present.  MEDIASTINUM AND KARLA: No adenopathy.  CHEST WALL AND LOWER NECK: No masses.    ABDOMEN AND PELVIS:  LIVER: Normal.  BILE DUCTS: Nondilated.  GALLBLADDER: Sludge and diffuse nonspecific wall edema.  SPLEEN: Normal.  PANCREAS: Normal.  ADRENALS: Normal.  KIDNEYS/URETERS: No hydronephrosis orurinary tract calculi.    BLADDER: Collapsed around a White catheter balloon. Multiple stones within the right and left posterior diverticula.  REPRODUCTIVE ORGANS: Nonenlarged.    BOWEL: No bowel-related abnormality.  PERITONEUM: No free air or ascites. No collection.  VESSELS: Aortoiliac atherosclerosis without aneurysm.  RETROPERITONEUM/LYMPH NODES: No adenopathy.  ABDOMINAL WALL: Normal.  BONES: No aggressive lesion.    IMPRESSION:  *  Pulmonary edema and small bilateral pleural effusions.  * Multiple small stones layering within urinary bladder diverticula.              DAVON BOYD MD; Attending Radiologist  This document has been electronically signed. May  8 2021  6:23PM    < end of copied text >  < from: US Abdomen Complete (US Abdomen Complete .) (21 @ 16:28) >  EXAM:  US ABDOMEN COMPLETE                                  PROCEDURE DATE:  2021          INTERPRETATION:  CLINICAL INFORMATION: Sepsis, elevated LFTs. Possible pyelonephritis.    COMPARISON: CT abdomen/pelvis May 06, 2021    TECHNIQUE: Sonography of the abdomen.    FINDINGS:    Liver: Mildly enlarged, measuring 19.1 cm. Uniform parenchymal echogenicity. No focal lesion. Smooth hepatic contours.  Bile ducts: Normal caliber. Common bile duct measures 6 mm.  Gallbladder: No stones or pericholecystic fluid. Nonspecific wall thickening, measuring 5 mm.  Pancreas: Visualized portions are within normal limits.  Spleen: 9.9 cm. Within normal limits.  Right kidney: 11.1 cm. No hydronephrosis.  Left kidney: 10.3 cm.  No hydronephrosis.  Ascites: None.  Aorta and IVC: Visualized portions are within normal limits.  Other: Trace right pleural effusion.    IMPRESSION:  Mild hepatomegaly.  Nonspecific gallbladder wall thickening.  Right pleural effusion.                PATRIA BARKSDALE MD; AttendingRadiologist  This document has been electronically signed. May  7 2021  4:58PM    < end of copied text >

## 2021-05-12 NOTE — PROGRESS NOTE ADULT - ASSESSMENT
81M with HTN, BPH who presents with hematuria and weakness.  Patient had a cystoscopy about 2 weeks ago (for evaluation for chronic bacteriuria) and since then has had intermittent hematuria.  Was given 3 days of an abx post-procedure.  Then today (Thursday), patient noted gross blood from his urine.  Patient also started to have chills, fevers, and nausea.  No pain.  No recent sick contacts.   COVID vaccine with Pfizer was done >1 month ago.  No other complaints.  Brought here for further evaluation.  In the ED, patient's triage VS were /65  HR 97  RR 24, 95%, T 101.2F.  Physical exam revealed that he was becoming altered (did not know where he was).  Labs were significant for leukopenia at WBC 0.78, Cr 1.34, lactate 2.3, and UA with +nitrite, mod LE with 0-2 WBC, large blood with >50 RBC, occasional bacteria.  Was started on ceftriaxone empirically.  CT renal showed possible cystitis, multiple bladder diverticula with likely layering calcifications within the posterior diverticula and along the posterior bladder wall, fullness in left renal pelvis.  Patient also had a head CT for AMS.  However, niece said that the patient was more lucid after the fluids and is no longer altered and responding accordingly.  Patient is being admitted on 5/7/21 for sepsis 2/2 UTI/ infection.    Hematology was consulted by Dr Zayas for thrombocytopenia while i was rounding at [facility] on 5/8/21 and i saw patient in few minutes consult was requested on 5/8/21, this am patient became hypotensive, was given iv fluids, placed on levophed, patient in septic shock possible and is on antibiotics as per id, patient was placed on bipap as per pulmonary and for possible intubation--final decision as per pulmonary/critical care  septic shock possible  hypotension  resp distress--pul following, on bipap at time of consult--possible intubation--final decision per critical care/pull  renal failure and worsening renal failure  thrombocytopenia--hem was consulted  anemia    RECOMMENDATION:  Anemia--likely multifactorial--renal/dilutional/nutritional  iron panel--low iron/saturation, ferritin 374  creatinine 2.8 (5/8)----1.59 (5/10)  no evidence of hemolysis  nl b12/folate  hb is at 11.4 today, was at 10.7 yesterday  as patient with low iron/transferrin saturation and looking for a rapid response, pt with renal insufficiency and possible poor po iron absorption with renal insufficiency patient is on iv iron trial, day 3 (3/3) today  po fe 325 daily for 30 days--short course from 5/13  monitor serial h/h  Thrombocytopenia--likely multifactorial, more likely from sepsis/ increased consumption  nl b12/folate  nl fibrinogen  platelets at 47 k today, was at 44 k yesterday  monitor serial platelets  smear reviewed  no splenomegaly on earlier abd us  urine retention/uti/cystitis possible--s/p arrington--clear urine--urology is following, h/o recent s/p cystoscopy  renal failure--nephrology is following, creatinine slow improving  septic shock, seen by id and is on antibiotics as per id  s/p resp failure--s/p intubation-- s/p extubation, follow pulmonary  gi/dvtp--scd  d/w pt at bedside

## 2021-05-12 NOTE — PROGRESS NOTE ADULT - SUBJECTIVE AND OBJECTIVE BOX
Patient is a 81y old  Male who presents with a chief complaint of hematuria, nausea, weakness (10 May 2021 14:06)    Patient seen in follow up for HORACIO.        PAST MEDICAL HISTORY:  Hypertension    Hyperlipidemia    BPH (benign prostatic hyperplasia)      MEDICATIONS  (STANDING):  ALBUTerol    0.083% 2.5 milliGRAM(s) Nebulizer every 8 hours  chlorhexidine 0.12% Liquid 15 milliLiter(s) Oral Mucosa two times a day  chlorhexidine 2% Cloths 1 Application(s) Topical daily  dorzolamide 2%/timolol 0.5% Ophthalmic Solution 1 Drop(s) Both EYES two times a day  iron sucrose Injectable 100 milliGRAM(s) IV Push every 24 hours  latanoprost 0.005% Ophthalmic Solution 1 Drop(s) Both EYES at bedtime  pantoprazole  Injectable 40 milliGRAM(s) IV Push daily  piperacillin/tazobactam IVPB.. 3.375 Gram(s) IV Intermittent every 8 hours    MEDICATIONS  (PRN):  acetaminophen   Tablet .. 650 milliGRAM(s) Oral every 6 hours PRN Temp greater or equal to 38C (100.4F), Mild Pain (1 - 3)    T(C): 36.9 (05-12-21 @ 13:14), Max: 37.2 (05-12-21 @ 00:05)  HR: 63 (05-12-21 @ 15:03) (52 - 76)  BP: 143/76 (05-12-21 @ 13:14) (103/79 - 145/73)  RR: 17 (05-12-21 @ 13:14)  SpO2: 98% (05-12-21 @ 15:03)  Wt(kg): --  I&O's Detail    11 May 2021 07:01  -  12 May 2021 07:00  --------------------------------------------------------  IN:    IV PiggyBack: 200 mL    Oral Fluid: 240 mL  Total IN: 440 mL    OUT:    Indwelling Catheter - Urethral (mL): 1800 mL  Total OUT: 1800 mL    Total NET: -1360 mL      12 May 2021 07:01  -  12 May 2021 15:13  --------------------------------------------------------  IN:    IV PiggyBack: 25 mL    Oral Fluid: 120 mL  Total IN: 145 mL    OUT:  Total OUT: 0 mL    Total NET: 145 mL              PHYSICAL EXAM:  General: No distress  Respiratory: b/l air entry  Cardiovascular: S1 S2  Gastrointestinal: soft  Extremities:  no edema      LABORATORY:                        11.4   22.05 )-----------( 47       ( 12 May 2021 06:10 )             34.1     05-12    141  |  108  |  39<H>  ----------------------------<  92  3.5   |  27  |  1.31<H>    Ca    8.1<L>      12 May 2021 06:10  Phos  2.0     05-12  Mg     2.0     05-12    TPro  5.3<L>  /  Alb  1.9<L>  /  TBili  1.1  /  DBili  x   /  AST  55<H>  /  ALT  85<H>  /  AlkPhos  519<H>  05-12    Sodium, Serum: 141 mmol/L (05-12 @ 06:10)  Sodium, Serum: 142 mmol/L (05-11 @ 07:19)    Potassium, Serum: 3.5 mmol/L (05-12 @ 06:10)  Potassium, Serum: 4.1 mmol/L (05-11 @ 07:19)    Hemoglobin: 11.4 g/dL (05-12 @ 06:10)  Hemoglobin: 10.7 g/dL (05-11 @ 07:06)  Hemoglobin: 10.6 g/dL (05-10 @ 06:11)    Creatinine, Serum 1.31 (05-12 @ 06:10)  Creatinine, Serum 1.38 (05-11 @ 07:19)  Creatinine, Serum 1.59 (05-10 @ 06:11)        LIVER FUNCTIONS - ( 12 May 2021 06:10 )  Alb: 1.9 g/dL / Pro: 5.3 g/dL / ALK PHOS: 519 U/L / ALT: 85 U/L DA / AST: 55 U/L / GGT: x

## 2021-05-12 NOTE — PROGRESS NOTE ADULT - SUBJECTIVE AND OBJECTIVE BOX
Date/Time Patient Seen:  		  Referring MD:   Data Reviewed	       Patient is a 81y old  Male who presents with a chief complaint of hematuria, nausea, weakness (11 May 2021 14:19)      Subjective/HPI     PAST MEDICAL & SURGICAL HISTORY:  Hypertension    Hyperlipidemia    BPH (benign prostatic hyperplasia)    History of prostate surgery          Medication list         MEDICATIONS  (STANDING):  ALBUTerol    0.083% 2.5 milliGRAM(s) Nebulizer every 8 hours  chlorhexidine 0.12% Liquid 15 milliLiter(s) Oral Mucosa two times a day  chlorhexidine 2% Cloths 1 Application(s) Topical daily  dorzolamide 2%/timolol 0.5% Ophthalmic Solution 1 Drop(s) Both EYES two times a day  iron sucrose Injectable 100 milliGRAM(s) IV Push every 24 hours  latanoprost 0.005% Ophthalmic Solution 1 Drop(s) Both EYES at bedtime  midodrine 5 milliGRAM(s) Oral every 8 hours  pantoprazole  Injectable 40 milliGRAM(s) IV Push daily  piperacillin/tazobactam IVPB.. 3.375 Gram(s) IV Intermittent every 8 hours    MEDICATIONS  (PRN):  acetaminophen   Tablet .. 650 milliGRAM(s) Oral every 6 hours PRN Temp greater or equal to 38C (100.4F), Mild Pain (1 - 3)         Vitals log        ICU Vital Signs Last 24 Hrs  T(C): 37.1 (12 May 2021 04:05), Max: 37.2 (12 May 2021 00:05)  T(F): 98.7 (12 May 2021 04:05), Max: 98.9 (12 May 2021 00:05)  HR: 59 (12 May 2021 06:00) (52 - 74)  BP: 144/76 (12 May 2021 06:00) (105/60 - 144/76)  BP(mean): 96 (12 May 2021 06:00) (75 - 96)  ABP: --  ABP(mean): --  RR: 16 (12 May 2021 06:00) (12 - 24)  SpO2: 100% (12 May 2021 06:00) (94% - 100%)           Input and Output:  I&O's Detail    11 May 2021 07:01  -  12 May 2021 07:00  --------------------------------------------------------  IN:    IV PiggyBack: 200 mL    Oral Fluid: 240 mL  Total IN: 440 mL    OUT:    Indwelling Catheter - Urethral (mL): 1800 mL  Total OUT: 1800 mL    Total NET: -1360 mL          Lab Data                        11.4   22.05 )-----------( 47       ( 12 May 2021 06:10 )             34.1     05-12    141  |  108  |  39<H>  ----------------------------<  92  3.5   |  27  |  1.31<H>    Ca    8.1<L>      12 May 2021 06:10  Phos  2.0     05-12  Mg     2.0     05-12    TPro  5.3<L>  /  Alb  1.9<L>  /  TBili  1.1  /  DBili  x   /  AST  55<H>  /  ALT  85<H>  /  AlkPhos  519<H>  05-12            Review of Systems	      Objective     Physical Examination    heart s1s2  lung dec BS  abd soft  head nc  frail  weak      Pertinent Lab findings & Imaging      Christopher:  NO   Adequate UO     I&O's Detail    11 May 2021 07:01  -  12 May 2021 07:00  --------------------------------------------------------  IN:    IV PiggyBack: 200 mL    Oral Fluid: 240 mL  Total IN: 440 mL    OUT:    Indwelling Catheter - Urethral (mL): 1800 mL  Total OUT: 1800 mL    Total NET: -1360 mL               Discussed with:     Cultures:	        Radiology

## 2021-05-12 NOTE — CONSULT NOTE ADULT - ASSESSMENT
81 year old male with a history of HTN, HL, BPH who was admitted with urosepsis, strep bacteremia, course c/b septic shock and respiratory failure with normal LFTs on admission, then with elevated T bili, liver enzymes followed by improvement but with later peaking of ALP, this may be secondary to sepsis alone, with strep bacteremia.  On prior imaging, no evidence of cholangitis (admittedly on non contrast CT), and only non specific gallbladder wall thickening with sludge.  No obvious intra abdominal source seen on CT such as abscess. Repeat cultures  have been NGTD, but there has been persistent leukocytosis.    1. HIDA scan   2. continue with IV Abx    Imani Mcfarlane MD  Gastroenterology  [address] [address] [phone]      
81M with HTN, BPH who presents with hematuria and weakness.  Sepsis - Shock - eval in progress - serial Lactic acid - IVF - I and O - monitor VS and HD and Sat  CKD - HORACIO - IVF -   keep MAP > 60, Midodrine 10 mg PO TID -   Serial labs - replete lytes - monitor biomarkers  ID eval noted - pt is on broad ABX  - cx in progress -   DVT p  assist with needs  may need Anxiolytics  I jeremy - mucus impaction - mucus plugging on imaging - Albuterol PRN - Chest PT -   BPH rx regimen  US abd - CT renal stone hunt - CXR - noted  SPCU monitoring - care and supportive measures  Atenolol - home med - on HOLD - hypotension
The patient is an 81 year old male with a history of HTN, HL, BPH who was admitted with urosepsis, strep bacteremia, course c/b septic shock and respiratory failure.    Plan:  - Blood cultures positive for strep anginosus  - Echo with normal LV systolic function, aortic valve sclerosis, right sided dilatation with reduced function  - No obvious endocarditis on TTE but limited by calcified aortic valve  - Last cultures NGTD  - If cultures remain positive, can evaluate further with ERICK  - CT chest with pulm edema and small pleural effusions - likely due to prior IV fluids  - Can dose furosemide prn  - On zosyn  - Continue midodrine 10 mg tid  - Weaned off of vent  - ICU care
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 catheter,  500 cc grossly y clear urine drained  Shock  UTI  HORACIO      Recommend  DO NOT REMOVE TYLER    REPEAT ABDOMINAL CT, obtain Chest CT and Cardi. evaluation imaging     Discussed with Dr. Basurto

## 2021-05-12 NOTE — PROGRESS NOTE ADULT - ASSESSMENT
HORACIO: ATN  s/p Resp failure  Sepsis, s/p Shock Strep bacteremia3  Hypophosphatemia    Improved and stable renal indices. K phos supps. To continue current meds. Avoid nephrotoxic meds as possible.   Will follow electrolytes and renal function trend.

## 2021-05-12 NOTE — PROGRESS NOTE ADULT - ASSESSMENT
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     Septic Shock / Strep Bacteremia   IV Zosyn -BMP still pending; CBC reviewed and leukocytosis mainly steroid driven at this point; Steroids stopped   Infection does not seem to be  source given negative Urine Culture. Initially thought to be the case due to recent instrumentation in the setting of chornic bacteruria.  Repeat CT Imaging reviewed. If second set of cultures remains positive may need ERICK to rule out Endo  Extubated 5/10/21; Off Pressors and Midodrine being tapered   CC: Dr. Zayas; ID: Dr. Hernandez; Cards: Dr. Esteban    Hypoxic Respiratory Failure   Due to Sepsis and Volume Overload; BNP Elevated   Echo reviewed with no signs of failure;   Imaging does not show any obvious pulm infection  Extubated 5/10/21    Acute Renal Failure   In the setting of Sepsis; Improving  Renally dose meds and monitor BMP and electrolytes     Metabolic Encephalopathy   Improving  Due to Sepsis       Thrombocytopenia  In the setting of Sepsis  Avoid LMWH  Hematology (Dr. Madera)    HTN  Hold anti-HTN meds    BPH  Hold Flomax    Diet  Regular    DVT Prophylaxis  SCD    Disposition  Full Code/Inpatient  Discharge planning pending hospital course   CC Time spent >45 minutes   Downgrade out of [facility]     Plan of care was discuss with patient, all questions were answered, seems understand and in agreement.

## 2021-05-12 NOTE — PROGRESS NOTE ADULT - ASSESSMENT
The patient is an 81 year old male with a history of HTN, HL, BPH who was admitted with urosepsis, strep bacteremia, course c/b septic shock and respiratory failure.    Plan:  - Blood cultures positive for strep anginosus - unlikely cause of endocarditis  - Echo with normal LV systolic function, aortic valve sclerosis, right sided dilatation with reduced function  - No obvious endocarditis on TTE but limited by calcified aortic valve  - Last cultures NGTD  - If cultures remain positive, can evaluate further with ERICK  - CT chest with pulm edema and small pleural effusions - likely due to prior IV fluids  - Can dose furosemide prn  - BNP improving  - On zosyn  - Discontinue midodrine  - GI eval

## 2021-05-12 NOTE — PHYSICAL THERAPY INITIAL EVALUATION ADULT - GAIT TRAINING, PT EVAL
Goals 5-7 days, Pt will ambulate 150 w/ rolling walker w/ supervision. Goals 5-7 days, Pt will ambulate 150 w/ rolling walker w/ CGA

## 2021-05-13 ENCOUNTER — TRANSCRIPTION ENCOUNTER (OUTPATIENT)
Age: 81
End: 2021-05-13

## 2021-05-13 LAB
ALBUMIN SERPL ELPH-MCNC: 1.8 G/DL — LOW (ref 3.3–5)
ALP SERPL-CCNC: 488 U/L — HIGH (ref 30–120)
ALT FLD-CCNC: 81 U/L DA — HIGH (ref 10–60)
ANION GAP SERPL CALC-SCNC: 5 MMOL/L — SIGNIFICANT CHANGE UP (ref 5–17)
AST SERPL-CCNC: 40 U/L — SIGNIFICANT CHANGE UP (ref 10–40)
BASOPHILS # BLD AUTO: 0.06 K/UL — SIGNIFICANT CHANGE UP (ref 0–0.2)
BASOPHILS NFR BLD AUTO: 0.4 % — SIGNIFICANT CHANGE UP (ref 0–2)
BILIRUB SERPL-MCNC: 1.2 MG/DL — SIGNIFICANT CHANGE UP (ref 0.2–1.2)
BUN SERPL-MCNC: 29 MG/DL — HIGH (ref 7–23)
CALCIUM SERPL-MCNC: 7.8 MG/DL — LOW (ref 8.4–10.5)
CHLORIDE SERPL-SCNC: 105 MMOL/L — SIGNIFICANT CHANGE UP (ref 96–108)
CO2 SERPL-SCNC: 30 MMOL/L — SIGNIFICANT CHANGE UP (ref 22–31)
CREAT SERPL-MCNC: 1.17 MG/DL — SIGNIFICANT CHANGE UP (ref 0.5–1.3)
CULTURE RESULTS: SIGNIFICANT CHANGE UP
CULTURE RESULTS: SIGNIFICANT CHANGE UP
EOSINOPHIL # BLD AUTO: 0.35 K/UL — SIGNIFICANT CHANGE UP (ref 0–0.5)
EOSINOPHIL NFR BLD AUTO: 2.1 % — SIGNIFICANT CHANGE UP (ref 0–6)
GLUCOSE SERPL-MCNC: 99 MG/DL — SIGNIFICANT CHANGE UP (ref 70–99)
HCT VFR BLD CALC: 33.5 % — LOW (ref 39–50)
HGB BLD-MCNC: 11.2 G/DL — LOW (ref 13–17)
IMM GRANULOCYTES NFR BLD AUTO: 5.5 % — HIGH (ref 0–1.5)
LYMPHOCYTES # BLD AUTO: 1.93 K/UL — SIGNIFICANT CHANGE UP (ref 1–3.3)
LYMPHOCYTES # BLD AUTO: 11.7 % — LOW (ref 13–44)
MCHC RBC-ENTMCNC: 29.3 PG — SIGNIFICANT CHANGE UP (ref 27–34)
MCHC RBC-ENTMCNC: 33.4 GM/DL — SIGNIFICANT CHANGE UP (ref 32–36)
MCV RBC AUTO: 87.7 FL — SIGNIFICANT CHANGE UP (ref 80–100)
MONOCYTES # BLD AUTO: 1.5 K/UL — HIGH (ref 0–0.9)
MONOCYTES NFR BLD AUTO: 9.1 % — SIGNIFICANT CHANGE UP (ref 2–14)
NEUTROPHILS # BLD AUTO: 11.8 K/UL — HIGH (ref 1.8–7.4)
NEUTROPHILS NFR BLD AUTO: 71.2 % — SIGNIFICANT CHANGE UP (ref 43–77)
NRBC # BLD: 0 /100 WBCS — SIGNIFICANT CHANGE UP (ref 0–0)
PLATELET # BLD AUTO: 75 K/UL — LOW (ref 150–400)
POTASSIUM SERPL-MCNC: 3 MMOL/L — LOW (ref 3.5–5.3)
POTASSIUM SERPL-SCNC: 3 MMOL/L — LOW (ref 3.5–5.3)
PROT SERPL-MCNC: 5 G/DL — LOW (ref 6–8.3)
RBC # BLD: 3.82 M/UL — LOW (ref 4.2–5.8)
RBC # FLD: 13 % — SIGNIFICANT CHANGE UP (ref 10.3–14.5)
SODIUM SERPL-SCNC: 140 MMOL/L — SIGNIFICANT CHANGE UP (ref 135–145)
SPECIMEN SOURCE: SIGNIFICANT CHANGE UP
SPECIMEN SOURCE: SIGNIFICANT CHANGE UP
WBC # BLD: 16.55 K/UL — HIGH (ref 3.8–10.5)
WBC # FLD AUTO: 16.55 K/UL — HIGH (ref 3.8–10.5)

## 2021-05-13 PROCEDURE — 99233 SBSQ HOSP IP/OBS HIGH 50: CPT

## 2021-05-13 RX ORDER — CEFPODOXIME PROXETIL 100 MG
200 TABLET ORAL EVERY 12 HOURS
Refills: 0 | Status: DISCONTINUED | OUTPATIENT
Start: 2021-05-14 | End: 2021-05-14

## 2021-05-13 RX ORDER — DIPHENHYDRAMINE HYDROCHLORIDE AND LIDOCAINE HYDROCHLORIDE AND ALUMINUM HYDROXIDE AND MAGNESIUM HYDRO
5 KIT THREE TIMES A DAY
Refills: 0 | Status: DISCONTINUED | OUTPATIENT
Start: 2021-05-13 | End: 2021-05-14

## 2021-05-13 RX ORDER — POTASSIUM CHLORIDE 20 MEQ
10 PACKET (EA) ORAL
Refills: 0 | Status: COMPLETED | OUTPATIENT
Start: 2021-05-13 | End: 2021-05-13

## 2021-05-13 RX ORDER — SODIUM,POTASSIUM PHOSPHATES 278-250MG
1 POWDER IN PACKET (EA) ORAL THREE TIMES A DAY
Refills: 0 | Status: DISCONTINUED | OUTPATIENT
Start: 2021-05-13 | End: 2021-05-14

## 2021-05-13 RX ORDER — HEPARIN SODIUM 5000 [USP'U]/ML
5000 INJECTION INTRAVENOUS; SUBCUTANEOUS EVERY 12 HOURS
Refills: 0 | Status: DISCONTINUED | OUTPATIENT
Start: 2021-05-13 | End: 2021-05-14

## 2021-05-13 RX ADMIN — DORZOLAMIDE HYDROCHLORIDE TIMOLOL MALEATE 1 DROP(S): 20; 5 SOLUTION/ DROPS OPHTHALMIC at 17:12

## 2021-05-13 RX ADMIN — ALBUTEROL 2.5 MILLIGRAM(S): 90 AEROSOL, METERED ORAL at 20:15

## 2021-05-13 RX ADMIN — PIPERACILLIN AND TAZOBACTAM 25 GRAM(S): 4; .5 INJECTION, POWDER, LYOPHILIZED, FOR SOLUTION INTRAVENOUS at 22:01

## 2021-05-13 RX ADMIN — Medication 325 MILLIGRAM(S): at 11:35

## 2021-05-13 RX ADMIN — Medication 100 MILLIEQUIVALENT(S): at 08:39

## 2021-05-13 RX ADMIN — PIPERACILLIN AND TAZOBACTAM 25 GRAM(S): 4; .5 INJECTION, POWDER, LYOPHILIZED, FOR SOLUTION INTRAVENOUS at 05:13

## 2021-05-13 RX ADMIN — DORZOLAMIDE HYDROCHLORIDE TIMOLOL MALEATE 1 DROP(S): 20; 5 SOLUTION/ DROPS OPHTHALMIC at 05:14

## 2021-05-13 RX ADMIN — DIPHENHYDRAMINE HYDROCHLORIDE AND LIDOCAINE HYDROCHLORIDE AND ALUMINUM HYDROXIDE AND MAGNESIUM HYDRO 5 MILLILITER(S): KIT at 13:12

## 2021-05-13 RX ADMIN — Medication 1 PACKET(S): at 22:02

## 2021-05-13 RX ADMIN — HEPARIN SODIUM 5000 UNIT(S): 5000 INJECTION INTRAVENOUS; SUBCUTANEOUS at 17:13

## 2021-05-13 RX ADMIN — ALBUTEROL 2.5 MILLIGRAM(S): 90 AEROSOL, METERED ORAL at 14:35

## 2021-05-13 RX ADMIN — PIPERACILLIN AND TAZOBACTAM 25 GRAM(S): 4; .5 INJECTION, POWDER, LYOPHILIZED, FOR SOLUTION INTRAVENOUS at 13:12

## 2021-05-13 RX ADMIN — CHLORHEXIDINE GLUCONATE 15 MILLILITER(S): 213 SOLUTION TOPICAL at 11:35

## 2021-05-13 RX ADMIN — DIPHENHYDRAMINE HYDROCHLORIDE AND LIDOCAINE HYDROCHLORIDE AND ALUMINUM HYDROXIDE AND MAGNESIUM HYDRO 5 MILLILITER(S): KIT at 22:01

## 2021-05-13 RX ADMIN — ALBUTEROL 2.5 MILLIGRAM(S): 90 AEROSOL, METERED ORAL at 07:42

## 2021-05-13 RX ADMIN — CHLORHEXIDINE GLUCONATE 1 APPLICATION(S): 213 SOLUTION TOPICAL at 11:34

## 2021-05-13 RX ADMIN — Medication 100 MILLIEQUIVALENT(S): at 09:16

## 2021-05-13 RX ADMIN — PANTOPRAZOLE SODIUM 40 MILLIGRAM(S): 20 TABLET, DELAYED RELEASE ORAL at 11:35

## 2021-05-13 RX ADMIN — LATANOPROST 1 DROP(S): 0.05 SOLUTION/ DROPS OPHTHALMIC; TOPICAL at 22:02

## 2021-05-13 RX ADMIN — Medication 100 MILLIEQUIVALENT(S): at 09:45

## 2021-05-13 RX ADMIN — Medication 1 PACKET(S): at 13:12

## 2021-05-13 NOTE — PROGRESS NOTE ADULT - ASSESSMENT
contact #  niece----Fide Ayers  phone # [phone]    81M with HTN, BPH who presents with hematuria and weakness.  Patient had a cystoscopy about 2 weeks ago (for evaluation for chronic bacteriuria) and since then has had intermittent hematuria.  Was given 3 days of an abx post-procedure.  Then today (Thursday), patient noted gross blood from his urine.  Patient also started to have chills, fevers, and nausea.  No pain.  No recent sick contacts.   COVID vaccine with Pfizer was done >1 month ago.  No other complaints.  Brought here for further evaluation.  In the ED, patient's triage VS were /65  HR 97  RR 24, 95%, T 101.2F.  Physical exam revealed that he was becoming altered (did not know where he was).  Labs were significant for leukopenia at WBC 0.78, Cr 1.34, lactate 2.3, and UA with +nitrite, mod LE with 0-2 WBC, large blood with >50 RBC, occasional bacteria.  Was started on ceftriaxone empirically.  CT renal showed possible cystitis, multiple bladder diverticula with likely layering calcifications within the posterior diverticula and along the posterior bladder wall, fullness in left renal pelvis.  Patient also had a head CT for AMS.  However, niece said that the patient was more lucid after the fluids and is no longer altered and responding accordingly.  Patient is being admitted on 5/7/21 for sepsis 2/2 UTI/ infection.    Hematology was consulted by Dr Zayas for thrombocytopenia while i was rounding at [place] on 5/8/21 and i saw patient in few minutes consult was requested on 5/8/21, this am patient became hypotensive, was given iv fluids, placed on levophed, patient in septic shock possible and is on antibiotics as per id, patient was placed on bipap as per pulmonary and for possible intubation--final decision as per pulmonary/critical care  septic shock possible  hypotension  resp distress--pul following, on bipap at time of consult--possible intubation--final decision per critical care/pull  renal failure and worsening renal failure  thrombocytopenia--hem was consulted  anemia    RECOMMENDATION:  Anemia--likely multifactorial--renal/dilutional/nutritional  iron panel--low iron/saturation, ferritin 374  creatinine 2.8 (5/8)----1.59 (5/10)---1.17 (5/13)  no evidence of hemolysis  nl b12/folate  hb is at 11.2 today, was at 11.4 yesterday  as patient with low iron/transferrin saturation and looking for a rapid response, pt with renal insufficiency and possible poor po iron absorption with renal insufficiency patient is on iv iron trial, day 3 (3/3) today  po fe 325 daily for 30 days--short course from 5/13  monitor serial h/h  Thrombocytopenia--likely multifactorial, more likely from sepsis/ increased consumption  nl b12/folate  nl fibrinogen  platelets at 75 k today, was at 47 k yesterday  monitor serial platelets  smear reviewed  no splenomegaly on earlier abd us  urine retention/uti/cystitis possible--s/p arrington--clear urine--urology is following, h/o recent s/p cystoscopy  renal failure--nephrology is following, creatinine slow improving  septic shock, seen by id and is on antibiotics as per id  s/p resp failure--s/p intubation-- s/p extubation, follow pulmonary  gi/dvtp--start heparin 5000 q 12 hr, hold heparin if platelets under 50 k and/or bleeding  d/w pt at bedside, d/w pt niece at [phone], in agreement of above   contact #  niece----Fide Ayers  phone # [phone]    81M with HTN, BPH who presents with hematuria and weakness.  Patient had a cystoscopy about 2 weeks ago (for evaluation for chronic bacteriuria) and since then has had intermittent hematuria.  Was given 3 days of an abx post-procedure.  Then today (Thursday), patient noted gross blood from his urine.  Patient also started to have chills, fevers, and nausea.  No pain.  No recent sick contacts.   COVID vaccine with Pfizer was done >1 month ago.  No other complaints.  Brought here for further evaluation.  In the ED, patient's triage VS were /65  HR 97  RR 24, 95%, T 101.2F.  Physical exam revealed that he was becoming altered (did not know where he was).  Labs were significant for leukopenia at WBC 0.78, Cr 1.34, lactate 2.3, and UA with +nitrite, mod LE with 0-2 WBC, large blood with >50 RBC, occasional bacteria.  Was started on ceftriaxone empirically.  CT renal showed possible cystitis, multiple bladder diverticula with likely layering calcifications within the posterior diverticula and along the posterior bladder wall, fullness in left renal pelvis.  Patient also had a head CT for AMS.  However, niece said that the patient was more lucid after the fluids and is no longer altered and responding accordingly.  Patient is being admitted on 5/7/21 for sepsis 2/2 UTI/ infection.    Hematology was consulted by Dr Zayas for thrombocytopenia while i was rounding at [place] on 5/8/21 and i saw patient in few minutes consult was requested on 5/8/21, this am patient became hypotensive, was given iv fluids, placed on levophed, patient in septic shock possible and is on antibiotics as per id, patient was placed on bipap as per pulmonary and for possible intubation--final decision as per pulmonary/critical care  septic shock possible  hypotension  resp distress--pul following, on bipap at time of consult--possible intubation--final decision per critical care/pull  renal failure and worsening renal failure  thrombocytopenia--hem was consulted  anemia    RECOMMENDATION:  Anemia--likely multifactorial--renal/dilutional/nutritional  iron panel--low iron/saturation, ferritin 374  creatinine 2.8 (5/8)----1.59 (5/10)---1.17 (5/13)  no evidence of hemolysis  nl b12/folate  hb is at 11.2 today, was at 11.4 yesterday  patient with low egfr, as patient with low iron/transferrin saturation and as were looking for a rapid response patient is s/p iv iron trial for 3 days 5/10---5/12  po fe 325 daily for 30 days--short course from 5/13  monitor serial h/h  Thrombocytopenia--likely multifactorial, more likely from sepsis/ increased consumption  nl b12/folate  nl fibrinogen  platelets at 75 k today, was at 47 k yesterday  monitor serial platelets  smear reviewed  no splenomegaly on earlier abd us  urine retention/uti/cystitis possible--s/p arrington--clear urine--urology is following, h/o recent s/p cystoscopy  renal failure--nephrology is following, creatinine slow improving  septic shock, seen by id and is on antibiotics as per id  s/p resp failure--s/p intubation-- s/p extubation, follow pulmonary  gi/dvtp--start heparin 5000 q 12 hr, hold heparin if platelets under 50 k and/or bleeding  d/w pt at bedside, d/w pt niece at [phone], in agreement of above

## 2021-05-13 NOTE — PROGRESS NOTE ADULT - ASSESSMENT
81M with HTN, BPH who presents with hematuria and weakness.  extubated to CPAP  Sepsis - Shock - on ABX - ID following - MAP > 60, Cx and biomarkers reviewed,  CKD - HORACIO - s/p IVF - Pressors - Renal Eval  keep MAP > 60, Midodrine 10 mg PO TID - Pressors IV as needed   Serial labs - replete lytes - monitor biomarkers  ID eval noted - pt is on broad ABX  - cx reviewed  I jeremy - mucus impaction - mucus plugging on imaging - Albuterol PRN - Chest PT -   BPH rx regimen  US abd - CT renal stone hunt - CXR - noted  SPCU monitoring - care and supportive measures

## 2021-05-13 NOTE — DISCHARGE NOTE NURSING/CASE MANAGEMENT/SOCIAL WORK - NSDCCRNAME_GEN_ALL_CORE_FT
You have an MLTC through [employer] [phone]. Your niece is your CDPAP aide 4 days a week for 7 hours a day with [facility] home care @ [phone]

## 2021-05-13 NOTE — PROGRESS NOTE ADULT - SUBJECTIVE AND OBJECTIVE BOX
Patient is a 81y old  Male who presents with a chief complaint of hematuria, nausea, weakness (13 May 2021 12:06)       Pt is seen and examined  pt is awake and lying in bed/out of bed to chair  pt seems comfortable and denies any complaints at this time    HPI:  81M with HTN, BPH who presents with hematuria and weakness.  Patient had a cystoscopy about 2 weeks ago (for evaluation for chronic bacteriuria) and since then has had intermittent hematuria.  Was given 3 days of an abx post-procedure.  Then today (Thursday), patient noted gross blood from his urine.  Patient also started to have chills, fevers, and nausea.  No pain.  No recent sick contacts.   COVID vaccine with Pfizer was done >1 month ago.  No other complaints.  Brought here for further evaluation.  In the ED, patient's triage VS were /65  HR 97  RR 24, 95%, T 101.2F.  Physical exam revealed that he was becoming altered (did not know where he was).  Labs were significant for leukopenia at WBC 0.78, Cr 1.34, lactate 2.3, and UA with +nitrite, mod LE with 0-2 WBC, large blood with >50 RBC, occasional bacteria.  Was started on ceftriaxone empirically.  CT renal showed possible cystitis, multiple bladder diverticula with likely layering calcifications within the posterior diverticula and along the posterior bladder wall, fullness in left renal pelvis.  Patient also had a head CT for AMS.  However, niece said that the patient was more lucid after the fluids and is no longer altered and responding accordingly.  Patient is being admitted for sepsis 2/2 UTI/ infection.   (07 May 2021 01:13)         ROS:  Negative except for:    MEDICATIONS  (STANDING):  ALBUTerol    0.083% 2.5 milliGRAM(s) Nebulizer every 8 hours  chlorhexidine 0.12% Liquid 15 milliLiter(s) Oral Mucosa two times a day  chlorhexidine 2% Cloths 1 Application(s) Topical daily  dorzolamide 2%/timolol 0.5% Ophthalmic Solution 1 Drop(s) Both EYES two times a day  ferrous    sulfate 325 milliGRAM(s) Oral daily  FIRST- Mouthwash  BLM 5 milliLiter(s) Swish and Spit three times a day  latanoprost 0.005% Ophthalmic Solution 1 Drop(s) Both EYES at bedtime  pantoprazole  Injectable 40 milliGRAM(s) IV Push daily  piperacillin/tazobactam IVPB.. 3.375 Gram(s) IV Intermittent every 8 hours  potassium phosphate / sodium phosphate Powder (PHOS-NaK) 1 Packet(s) Oral three times a day    MEDICATIONS  (PRN):  acetaminophen   Tablet .. 650 milliGRAM(s) Oral every 6 hours PRN Temp greater or equal to 38C (100.4F), Mild Pain (1 - 3)      Allergies    No Known Allergies    Intolerances        Vital Signs Last 24 Hrs  T(C): 36.4 (13 May 2021 12:38), Max: 37.2 (12 May 2021 15:43)  T(F): 97.6 (13 May 2021 12:38), Max: 98.9 (12 May 2021 15:43)  HR: 76 (13 May 2021 14:35) (64 - 81)  BP: 132/71 (13 May 2021 14:00) (124/71 - 150/77)  BP(mean): 90 (13 May 2021 14:00) (83 - 100)  RR: 15 (13 May 2021 14:00) (13 - 20)  SpO2: 98% (13 May 2021 14:35) (93% - 100%)    PHYSICAL EXAM  General: adult in NAD  HEENT: clear oropharynx, anicteric sclera, pink conjunctiva  Neck: supple  CV: normal S1/S2 with no murmur rubs or gallops  Lungs: positive air movement b/l ant lungs,clear to auscultation, no wheezes, no rales  Abdomen: soft non-tender non-distended, no hepatosplenomegaly  Ext: no clubbing cyanosis or edema  Skin: no rashes and no petechiae  Neuro: alert and oriented X 4, no focal deficits  LABS:                          11.2   16.55 )-----------( 75       ( 13 May 2021 06:53 )             33.5         Mean Cell Volume : 87.7 fl  Mean Cell Hemoglobin : 29.3 pg  Mean Cell Hemoglobin Concentration : 33.4 gm/dL  Auto Neutrophil # : 11.80 K/uL  Auto Lymphocyte # : 1.93 K/uL  Auto Monocyte # : 1.50 K/uL  Auto Eosinophil # : 0.35 K/uL  Auto Basophil # : 0.06 K/uL  Auto Neutrophil % : 71.2 %  Auto Lymphocyte % : 11.7 %  Auto Monocyte % : 9.1 %  Auto Eosinophil % : 2.1 %  Auto Basophil % : 0.4 %    Serial CBC's  05-13 @ 06:53  Hct-33.5 / Hgb-11.2 / Plat-75 / RBC-3.82 / WBC-16.55          Serial CBC's  05-12 @ 06:10  Hct-34.1 / Hgb-11.4 / Plat-47 / RBC-3.92 / WBC-22.05          Serial CBC's  05-11 @ 07:06  Hct-33.0 / Hgb-10.7 / Plat-44 / RBC-3.66 / WBC-29.80          Serial CBC's  05-10 @ 06:11  Hct-32.3 / Hgb-10.6 / Plat-48 / RBC-3.57 / WBC-20.59            05-13    140  |  105  |  29<H>  ----------------------------<  99  3.0<L>   |  30  |  1.17    Ca    7.8<L>      13 May 2021 06:53  Phos  2.0     05-12  Mg     2.0     05-12    TPro  5.0<L>  /  Alb  1.8<L>  /  TBili  1.2  /  DBili  x   /  AST  40  /  ALT  81<H>  /  AlkPhos  488<H>  05-13          Ferritin, Serum: 374 ng/mL (05-09-21 @ 00:08)  Vitamin B12, Serum: 1096 pg/mL (05-09-21 @ 00:08)  Folate, Serum: 8.7 ng/mL (05-09-21 @ 00:08)  Iron - Total Binding Capacity.: 187 ug/dL (05-08-21 @ 22:33)  Reticulocyte Percent: 1.3 % (05-08-21 @ 14:06)      Serum Protein Electrophoresis Interp: Normal Electrophoresis Pattern (05-08-21 @ 22:28)            BLOOD SMEAR INTERPRETATION:       RADIOLOGY & ADDITIONAL STUDIES:     Patient is a 81y old  Male who presents with a chief complaint of hematuria, nausea, weakness (13 May 2021 12:06)       Pt is seen and examined  pt is awake and lying in bed  patient is in icu, on monitor bed, on n/c oxygen  pt seems comfortable and denies any complaints at this time    HPI:  81M with HTN, BPH who presents with hematuria and weakness.  Patient had a cystoscopy about 2 weeks ago (for evaluation for chronic bacteriuria) and since then has had intermittent hematuria.  Was given 3 days of an abx post-procedure.  Then today (Thursday), patient noted gross blood from his urine.  Patient also started to have chills, fevers, and nausea.  No pain.  No recent sick contacts.   COVID vaccine with Pfizer was done >1 month ago.  No other complaints.  Brought here for further evaluation.  In the ED, patient's triage VS were /65  HR 97  RR 24, 95%, T 101.2F.  Physical exam revealed that he was becoming altered (did not know where he was).  Labs were significant for leukopenia at WBC 0.78, Cr 1.34, lactate 2.3, and UA with +nitrite, mod LE with 0-2 WBC, large blood with >50 RBC, occasional bacteria.  Was started on ceftriaxone empirically.  CT renal showed possible cystitis, multiple bladder diverticula with likely layering calcifications within the posterior diverticula and along the posterior bladder wall, fullness in left renal pelvis.  Patient also had a head CT for AMS.  However, niece said that the patient was more lucid after the fluids and is no longer altered and responding accordingly.  Patient is being admitted for sepsis 2/2 UTI/ infection.   (07 May 2021 01:13)         ROS:  as per hpi    MEDICATIONS  (STANDING):  ALBUTerol    0.083% 2.5 milliGRAM(s) Nebulizer every 8 hours  chlorhexidine 0.12% Liquid 15 milliLiter(s) Oral Mucosa two times a day  chlorhexidine 2% Cloths 1 Application(s) Topical daily  dorzolamide 2%/timolol 0.5% Ophthalmic Solution 1 Drop(s) Both EYES two times a day  ferrous    sulfate 325 milliGRAM(s) Oral daily  FIRST- Mouthwash  BLM 5 milliLiter(s) Swish and Spit three times a day  latanoprost 0.005% Ophthalmic Solution 1 Drop(s) Both EYES at bedtime  pantoprazole  Injectable 40 milliGRAM(s) IV Push daily  piperacillin/tazobactam IVPB.. 3.375 Gram(s) IV Intermittent every 8 hours  potassium phosphate / sodium phosphate Powder (PHOS-NaK) 1 Packet(s) Oral three times a day    MEDICATIONS  (PRN):  acetaminophen   Tablet .. 650 milliGRAM(s) Oral every 6 hours PRN Temp greater or equal to 38C (100.4F), Mild Pain (1 - 3)      Allergies    No Known Allergies    Intolerances        Vital Signs Last 24 Hrs  T(C): 36.4 (13 May 2021 12:38), Max: 37.2 (12 May 2021 15:43)  T(F): 97.6 (13 May 2021 12:38), Max: 98.9 (12 May 2021 15:43)  HR: 76 (13 May 2021 14:35) (64 - 81)  BP: 132/71 (13 May 2021 14:00) (124/71 - 150/77)  BP(mean): 90 (13 May 2021 14:00) (83 - 100)  RR: 15 (13 May 2021 14:00) (13 - 20)  SpO2: 98% (13 May 2021 14:35) (93% - 100%)    PHYSICAL EXAM  General: adult in NAD  HEENT: clear oropharynx, anicteric sclera, pink conjunctiva  Neck: supple  CV: normal S1/S2 with no murmur rubs or gallops  Lungs: positive air movement b/l ant lungs,clear to auscultation, no wheezes, no rales  Abdomen: soft non-tender non-distended, no hepatosplenomegaly  Ext: no clubbing cyanosis or edema  Skin: no rashes and no petechiae  Neuro: alert and oriented X 4, no focal deficits  LABS:                          11.2   16.55 )-----------( 75       ( 13 May 2021 06:53 )             33.5         Mean Cell Volume : 87.7 fl  Mean Cell Hemoglobin : 29.3 pg  Mean Cell Hemoglobin Concentration : 33.4 gm/dL  Auto Neutrophil # : 11.80 K/uL  Auto Lymphocyte # : 1.93 K/uL  Auto Monocyte # : 1.50 K/uL  Auto Eosinophil # : 0.35 K/uL  Auto Basophil # : 0.06 K/uL  Auto Neutrophil % : 71.2 %  Auto Lymphocyte % : 11.7 %  Auto Monocyte % : 9.1 %  Auto Eosinophil % : 2.1 %  Auto Basophil % : 0.4 %    Serial CBC's  05-13 @ 06:53  Hct-33.5 / Hgb-11.2 / Plat-75 / RBC-3.82 / WBC-16.55          Serial CBC's  05-12 @ 06:10  Hct-34.1 / Hgb-11.4 / Plat-47 / RBC-3.92 / WBC-22.05          Serial CBC's  05-11 @ 07:06  Hct-33.0 / Hgb-10.7 / Plat-44 / RBC-3.66 / WBC-29.80          Serial CBC's  05-10 @ 06:11  Hct-32.3 / Hgb-10.6 / Plat-48 / RBC-3.57 / WBC-20.59            05-13    140  |  105  |  29<H>  ----------------------------<  99  3.0<L>   |  30  |  1.17    Ca    7.8<L>      13 May 2021 06:53  Phos  2.0     05-12  Mg     2.0     05-12    TPro  5.0<L>  /  Alb  1.8<L>  /  TBili  1.2  /  DBili  x   /  AST  40  /  ALT  81<H>  /  AlkPhos  488<H>  05-13          Ferritin, Serum: 374 ng/mL (05-09-21 @ 00:08)  Vitamin B12, Serum: 1096 pg/mL (05-09-21 @ 00:08)  Folate, Serum: 8.7 ng/mL (05-09-21 @ 00:08)  Iron - Total Binding Capacity.: 187 ug/dL (05-08-21 @ 22:33)  Reticulocyte Percent: 1.3 % (05-08-21 @ 14:06)      Serum Protein Electrophoresis Interp: Normal Electrophoresis Pattern (05-08-21 @ 22:28)

## 2021-05-13 NOTE — PROGRESS NOTE ADULT - TIME BILLING
Subjective   Amanda Sherwood is a 53 y.o. female.     Chief Complaint   Patient presents with   • [facility]       HPI     Patient presents complaining of a somewhat chronic dry cough that is been going on for about a year.  She had previously been taken off of her ACE inhibitor is thought that this may be contributing.  She is taking losartan for some hypertension.  She continues to have cough and her cardiologist feels that it is not because of the arb. She does have a history of well-controlled reflux that she takes omeprazole 20 mg 4.  Cough is nonproductive.  She is also complaining of some chronic rhinitis and allergy type symptoms.    Hypertension: Stable and well controlled on hydrochlorothiazide and losartan.  She tries to stay active and eat a healthy diet.  She has gained about 30 pounds over the past year due to some left foot issues.    Patient has a history of anxiety and panic attacks.  This is stable and well controlled on Paxil 20 mg daily.    Her left foot pain started about 10 months ago after she was having bunionectomy surgery on the left foot.  Since then she's had another surgical procedure after it was found that the previous surgery was not healing properly.    The following portions of the patient's history were reviewed and updated as appropriate: allergies, current medications, past family history, past medical history, past social history, past surgical history and problem list.    Review of Systems   Respiratory: Positive for cough.    All other systems reviewed and are negative.      Objective  Vitals:    02/13/17 1348   BP: 140/85   Pulse: 65   Resp: 18   Temp: 97.7 °F (36.5 °C)   SpO2: 96%        Physical Exam   Constitutional: She is oriented to person, place, and time. She appears well-developed and well-nourished. No distress.   HENT:   Head: Normocephalic and atraumatic.   Right Ear: External ear normal.   Left Ear: External ear normal.   Nose: Nose normal.   Mouth/Throat: 
Oropharynx is clear and moist.   Eyes: Conjunctivae and EOM are normal. Pupils are equal, round, and reactive to light. Right eye exhibits no discharge. Left eye exhibits no discharge. No scleral icterus.   Neck: Normal range of motion. Neck supple.   Cardiovascular: Normal rate, regular rhythm and normal heart sounds.  Exam reveals no friction rub.    No murmur heard.  Pulmonary/Chest: Effort normal and breath sounds normal. No respiratory distress. She has no wheezes. She has no rales.   Abdominal: Soft. Bowel sounds are normal. She exhibits no distension. There is no tenderness. There is no rebound and no guarding.   Lymphadenopathy:     She has no cervical adenopathy.   Neurological: She is alert and oriented to person, place, and time.   Skin: Skin is warm and dry. She is not diaphoretic.   Nursing note and vitals reviewed.        Current Outpatient Prescriptions:   •  aspirin 81 MG tablet, Take  by mouth daily., Disp: , Rfl:   •  calcium carbonate (TUMS) 500 MG chewable tablet, Chew 1 tablet daily., Disp: , Rfl:   •  Cholecalciferol (VITAMIN D3) 5000 UNITS capsule capsule, Take 5,000 Units by mouth daily., Disp: , Rfl:   •  esomeprazole (NexIUM) 20 MG capsule, Take  by mouth daily., Disp: , Rfl:   •  Glucosamine HCl 1000 MG tablet, Take 2 tablets by mouth daily., Disp: , Rfl:   •  hydrochlorothiazide (HYDRODIURIL) 25 MG tablet, Take 1 tablet by mouth Daily. Needs an appointment to receive more refills, Disp: 90 tablet, Rfl: 3  •  IBUPROFEN PO, Take 1 tablet by mouth As Needed., Disp: , Rfl:   •  levocetirizine (XYZAL) 5 MG tablet, TAKE ONE TABLET BY MOUTH ONCE DAILY, Disp: 90 tablet, Rfl: 1  •  losartan (COZAAR) 50 MG tablet, TAKE ONE TABLET BY MOUTH DAILY, Disp: 90 tablet, Rfl: 2  •  Multiple Vitamins-Minerals (MULTIVITAMINS) chewable tablet, Chew daily., Disp: , Rfl:   •  Omega-3 Fatty Acids (FISH OIL) 1000 MG capsule capsule, Take  by mouth daily., Disp: , Rfl:   •  PARoxetine (PAXIL) 20 MG tablet, Take 1 
tablet by mouth Daily., Disp: , Rfl:   •  propranolol LA (INDERAL LA) 160 MG 24 hr capsule, TAKE ONE CAPSULE BY MOUTH DAILY, Disp: 90 capsule, Rfl: 2    Procedures    Lab Results (most recent)     None          Assessment/Plan   Amanda was seen today for establish care.    Diagnoses and all orders for this visit:    Essential hypertension    Chronic cough    Anxiety disorder due to general medical condition with panic attack    Left foot pain      Extensive conversation and chart review done regarding the patient's past medical history.  Issues are stable as above.  Some samples of some nasal steroid and antihistamine spray were given to the patient to trial for one month.  Advised patient to follow-up with specialists as indicated.  We'll follow up in 6 months or so for repeat blood work.    Return in about 6 months (around 8/13/2017) for Recheck.      Finesse Dao MD      
Case d/w RN.
Case d/w RN.

## 2021-05-13 NOTE — PROGRESS NOTE ADULT - SUBJECTIVE AND OBJECTIVE BOX
ANN MARIE NAVARRETE is a 81yMale , patient examined and chart reviewed.     INTERVAL HPI/ OVERNIGHT EVENTS:  Weak. awake alert. In chair.  No events.    Past Medical History--  PAST MEDICAL & SURGICAL HISTORY:  Hypertension  Hyperlipidemia  BPH (benign prostatic hyperplasia)  History of prostate surgery      For details regarding the patient's social history, family history, and other miscellaneous elements, please refer the initial infectious diseases consultation and/or the admitting history and physical examination for this admission.      ROS:  Unable to obtain due to : pt's condition      Current inpatient medications :    ANTIBIOTICS/RELEVANT:  piperacillin/tazobactam IVPB.. 3.375 Gram(s) IV Intermittent every 8 hours    MEDICATIONS  (STANDING):  ALBUTerol    0.083% 2.5 milliGRAM(s) Nebulizer every 8 hours  chlorhexidine 0.12% Liquid 15 milliLiter(s) Oral Mucosa two times a day  chlorhexidine 2% Cloths 1 Application(s) Topical daily  dorzolamide 2%/timolol 0.5% Ophthalmic Solution 1 Drop(s) Both EYES two times a day  ferrous    sulfate 325 milliGRAM(s) Oral daily  FIRST- Mouthwash  BLM 5 milliLiter(s) Swish and Spit three times a day  heparin   Injectable 5000 Unit(s) SubCutaneous every 12 hours  latanoprost 0.005% Ophthalmic Solution 1 Drop(s) Both EYES at bedtime  pantoprazole  Injectable 40 milliGRAM(s) IV Push daily  potassium phosphate / sodium phosphate Powder (PHOS-NaK) 1 Packet(s) Oral three times a day    MEDICATIONS  (PRN):  acetaminophen   Tablet .. 650 milliGRAM(s) Oral every 6 hours PRN Temp greater or equal to 38C (100.4F), Mild Pain (1 - 3)    Objective:  ICU Vital Signs Last 24 Hrs  T(C): 37.1 (13 May 2021 16:15), Max: 37.1 (13 May 2021 16:15)  T(F): 98.7 (13 May 2021 16:15), Max: 98.7 (13 May 2021 16:15)  HR: 67 (13 May 2021 16:00) (64 - 81)  BP: 120/68 (13 May 2021 16:00) (120/68 - 150/77)  BP(mean): 82 (13 May 2021 16:00) (82 - 100)  RR: 13 (13 May 2021 16:00) (13 - 20)  SpO2: 99% (13 May 2021 16:00) (93% - 100%)        Physical Exam:  GEN: weak  HEENT: normocephalic and atraumatic.   NECK: Supple.   LUNGS: Decreased to auscultation.  HEART: Regular rate and rhythm without murmur.  ABDOMEN: Soft, nontender, and nondistended.  Positive bowel sounds.    EXTREMITIES: +edema.  NEUROLOGIC: lethargic      LABS:                                11.2   16.55 )-----------( 75       ( 13 May 2021 06:53 )             33.5   05-13    140  |  105  |  29<H>  ----------------------------<  99  3.0<L>   |  30  |  1.17    Ca    7.8<L>      13 May 2021 06:53  Phos  2.0     05-12  Mg     2.0     05-12    TPro  5.0<L>  /  Alb  1.8<L>  /  TBili  1.2  /  DBili  x   /  AST  40  /  ALT  81<H>  /  AlkPhos  488<H>  05-13    Urinalysis Basic - ( 06 May 2021 22:26 )    Color: Sabiha / Appearance: Turbid / S.015 / pH: x  Gluc: x / Ketone: Negative  / Bili: Negative / Urobili: Negative mg/dL   Blood: x / Protein: 100 mg/dL / Nitrite: Positive   Leuk Esterase: Moderate / RBC: >50 /HPF / WBC 0-2   Sq Epi: x / Non Sq Epi: Occasional / Bacteria: Occasional      ABG - ( 08 May 2021 09:09 )  pH, Arterial: 7.22  pH, Blood: x     /  pCO2: 38    /  pO2: 72    / HCO3: 15    / Base Excess: -11.2 /  SaO2: 92         Urinalysis Basic - ( 06 May 2021 22:26 )    Color: Sabiha / Appearance: Turbid / S.015 / pH: x  Gluc: x / Ketone: Negative  / Bili: Negative / Urobili: Negative mg/dL   Blood: x / Protein: 100 mg/dL / Nitrite: Positive   Leuk Esterase: Moderate / RBC: >50 /HPF / WBC 0-2   Sq Epi: x / Non Sq Epi: Occasional / Bacteria: Occasional    MICROBIOLOGY:    Culture - Sputum (collected 09 May 2021 20:53)  Source: .Sputum Sputum  Gram Stain (09 May 2021 22:52):    Rare polymorphonuclear leukocytes per low power field    Few Squamous epithelial cells per low power field    No organisms seen per oil power field  Preliminary Report (10 May 2021 16:43):    Normal Respiratory Mckinley present    Culture - Blood (collected 08 May 2021 22:09)  Source: .Blood Blood-Venous  Preliminary Report (09 May 2021 23:01):    No growth to date.    Culture - Blood (collected 08 May 2021 22:09)  Source: .Blood Blood-Peripheral  Preliminary Report (09 May 2021 23:01):    No growth to date.    Culture - Urine (collected 08 May 2021 22:00)  Source: .Urine Catheterized  Final Report (09 May 2021 17:11):    No growth    Culture - Urine (collected 07 May 2021 13:22)  Source: .Urine Clean Catch (Midstream)  Final Report (08 May 2021 23:17):    Normal Urogenital mckinley present    Culture - Blood (collected 07 May 2021 13:22)  Source: .Blood Blood-Peripheral  Gram Stain (09 May 2021 09:40):    Growth in aerobic bottle: Gram Positive Cocci in Pairs and Chains    Growth in anaerobic bottle: Gram positive cocci in pairs  Preliminary Report (09 May 2021 09:40):    Growth in aerobic bottle: Gram Positive Cocci in Pairs and Chains    Growth in anaerobic bottle: Gram positive cocci in pairs    ***Blood Panel PCR results on this specimen are available    approximately 3 hours after the Gram stain result.***    Gram stain, PCR, and/or culture results may not always    correspond due to difference in methodologies.    ************************************************************    This PCR assay was performed by multiplex PCR. This    Assay tests for 66 bacterial and resistance gene targets.    Please refer to the [facility] Integrated biometrics test directory    at [email or web address] for details.  Organism: Blood Culture PCR (08 May 2021 18:03)  Organism: Blood Culture PCR (08 May 2021 18:03)      -  Streptococcus anginosus group: Detec      Method Type: PCR    Culture - Blood (collected 07 May 2021 13:22)  Source: .Blood Blood-Peripheral  Gram Stain (08 May 2021 22:10):    Growth in anaerobic bottle: Gram Positive Cocci in Pairs and Chains  Preliminary Report (08 May 2021 22:10):    Growth in anaerobic bottle: Gram Positive Cocci in Pairs and Chains      RADIOLOGY & ADDITIONAL STUDIES:    EXAM:  XR CHEST PORTABLE URGENT 1V                          EXAM:  XR CHEST PORTABLE ROUTINE 1V                                  PROCEDURE DATE:  2021          INTERPRETATION:  Portable chest radiograph    CLINICAL INFORMATION: Sepsis    TECHNIQUE:  Portable  AP view of the chest was obtained.    COMPARISON: 3/2/2020 available for review.    FINDINGS:    The lungs show mild perihilar and RIGHT greater than LEFT basilar diffuse airspace disease with bronchial wall thickening  concerning for bronchiectasis. No large airspace consolidation or effusion.  No pneumothorax.    There is mild cardiomegaly.    Healed RIGHT mid clavicle fracture deformity.      IMPRESSION:   Bilateral mild diffuse airspace disease with bronchial wall thickening....    FOLLOW-UP AP PORTABLE CHEST RADIOGRAPH 2021 5:26 PM:  No interval change. Bilateral diffuse infiltrates with bronchial wall thickening.      RADIOLOGY:    EXAM:  CT PELVIS ONLY                          EXAM:  CT ABDOMEN ONLY                          EXAM:  CT CHEST                                  PROCEDURE DATE:  2021          INTERPRETATION:  CLINICAL INFORMATION: Septic shock with nausea andchills    COMPARISON: Same day chest x-ray, CT abdomen pelvis 2021    CONTRAST/COMPLICATIONS:  IV Contrast: NONE  0 cc administered   0 cc discarded  Oral Contrast: NONE  Complications: None reported at time of study completion    PROCEDURE:  CT of the Chest, Abdomen and Pelvis was performed.  Sagittal and coronal reformats were performed.    FINDINGS:  CHEST:  LUNGS AND LARGE AIRWAYS: The tip of the endotracheal tube is above the contreras. The central airways are patent. Pulmonary edema and bibasilar atelectasis.  PLEURA: Small bilateral effusions.  VESSELS: Normal caliber aorta. Right IJ line tip in SVC.  HEART: Normal heart size. No pericardial effusion. Coronary artery calcifications are present.  MEDIASTINUM AND KARLA: No adenopathy.  CHEST WALL AND LOWER NECK: No masses.    ABDOMEN AND PELVIS:  LIVER: Normal.  BILE DUCTS: Nondilated.  GALLBLADDER: Sludge and diffuse nonspecific wall edema.  SPLEEN: Normal.  PANCREAS: Normal.  ADRENALS: Normal.  KIDNEYS/URETERS: No hydronephrosis orurinary tract calculi.    BLADDER: Collapsed around a Arrington catheter balloon. Multiple stones within the right and left posterior diverticula.  REPRODUCTIVE ORGANS: Nonenlarged.    BOWEL: No bowel-related abnormality.  PERITONEUM: No free air or ascites. No collection.  VESSELS: Aortoiliac atherosclerosis without aneurysm.  RETROPERITONEUM/LYMPH NODES: No adenopathy.  ABDOMINAL WALL: Normal.  BONES: No aggressive lesion.    IMPRESSION:  *  Pulmonary edema and small bilateral pleural effusions.  * Multiple small stones layering within urinary bladder diverticula.      Assessment :   81M with HTN, BPH recent cystoscopy about 2 weeks ago (for evaluation for chronic bacteriuria) admitted with acute cystitis with hematuria complicated by Strep septicemia, severe sepsis with septic shock and acute respiratory failure.   Intubated and on pressors 21 Extubated 5/10/21, off pressors.  CHF   Echo noted  HORACIO with AUR- arrington placed by urology 21  WBC rising and with abn LFTs- previous u/s and CT AP nonspecific findings- Sludge and diffuse nonspecific wall edema- seen by GI  HIDA neg  WBC downtrending  Making progress    Plan :   Dc Zosyn  Change to po Vantin x 7  days in am  Trend temps and cbc  Arrington per urology  Asp precautions    Dr Jack etr al to cover -    Continue with present regiment.  Appropriate use of antibiotics and adverse effects reviewed.        Critical care time greater then 35 minutes reviewing notes, labs data/ imaging , discussion with multidisciplinary team.    Thank you for allowing me to participate in care of your patient .        Frank Hernandez MD  Infectious Disease  [phone]

## 2021-05-13 NOTE — DISCHARGE NOTE NURSING/CASE MANAGEMENT/SOCIAL WORK - NSSCNAMETXT_GEN_ALL_CORE
[facility] - [phone] or [phone]  Nurse to visit the day after hospital discharge; physical therapist to follow. Please contact the home care agency if you have not heard from them by 12 noon on the day after your hospital discharge.

## 2021-05-13 NOTE — PROGRESS NOTE ADULT - ASSESSMENT
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     Septic Shock / Strep Bacteremia   IV Zosyn   repeat blood culture 5/8 NGTD.  Infection does not seem to be  source given negative Urine Culture. Initially thought to be the case due to recent instrumentation in the setting of chornic bacteruria.  Repeat CT Imaging reviewed. If second set of cultures remains positive may need ERICK to rule out Endo  Extubated 5/10/21; Off Pressors and Midodrine being tapered   CC: Dr. Zayas; ID: Dr. Hernandez; Cards: Dr. Esteban    Hypoxic Respiratory Failure   Due to Sepsis and Volume Overload; BNP Elevated   Echo reviewed with no signs of failure;   Imaging does not show any obvious pulm infection  Extubated 5/10/21    Hypokalemia/hypophosphatemia  replete and Trend levels.      Acute Renal Failure   resolved.    Metabolic Encephalopathy   Improving  Due to Sepsis       Thrombocytopenia  In the setting of Sepsis  Avoid LMWH  Hematology (Dr. Madera)    HTN  Hold anti-HTN meds    BPH  Hold Flomax    Mouth ulcers likely from intubation.  magic mouth wash for pain.    Diet  soft    DVT Prophylaxis  SCD    Disposition  Full Code/Inpatient  Discharge planning pending hospital course   CC Time spent >45 minutes   Downgrade out of [facility]PCU     Plan of care was discuss with patient, all questions were answered, seems understand and in agreement.         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     Septic Shock / Strep Bacteremia   IV Zosyn   repeat blood culture 5/8 NGTD.  Infection does not seem to be  source given negative Urine Culture. Initially thought to be the case due to recent instrumentation in the setting of chornic bacteruria.  Repeat CT Imaging reviewed. If second set of cultures remains positive may need ERICK to rule out Endo  Extubated 5/10/21; Off Pressors and Midodrine being tapered   Normal hepatobiliary scan. No radionuclide evidence of acute cholecystitis.    CC: Dr. Zayas; ID: Dr. Hernandez; Cards: Dr. Esteban    Hypoxic Respiratory Failure   Due to Sepsis and Volume Overload; BNP Elevated   Echo reviewed with no signs of failure;   Imaging does not show any obvious pulm infection  Extubated 5/10/21    Hypokalemia/hypophosphatemia  replete and Trend levels.      Acute Renal Failure   resolved.    Metabolic Encephalopathy   Improving  Due to Sepsis       Thrombocytopenia  In the setting of Sepsis  Avoid LMWH  Hematology (Dr. Madera)    HTN  Hold anti-HTN meds    BPH  Hold Flomax    Mouth ulcers likely from intubation.  magic mouth wash for pain.    Diet  soft    DVT Prophylaxis  SCD    Disposition  Full Code/Inpatient  Discharge planning pending hospital course   CC Time spent >45 minutes   Downgrade out of SPCU     Plan of care was discuss with patient, all questions were answered, seems understand and in agreement.         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     Septic Shock / Strep Bacteremia   IV Zosyn   repeat blood culture 5/8 NGTD.  Infection does not seem to be  source given negative Urine Culture. Initially thought to be the case due to recent instrumentation in the setting of chornic bacteruria.  Repeat CT Imaging reviewed. If second set of cultures remains positive may need ERICK to rule out Endo  Extubated 5/10/21; Off Pressors and Midodrine being tapered   Normal hepatobiliary scan. No radionuclide evidence of acute cholecystitis.    CC: Dr. Zayas; ID: Dr. Hernandez; Cards: Dr. Esteban    Hypoxic Respiratory Failure   Due to Sepsis and Volume Overload; BNP Elevated   Echo reviewed with no signs of failure;   Imaging does not show any obvious pulm infection  Extubated 5/10/21    Hypokalemia/hypophosphatemia  replete and Trend levels.      Acute Renal Failure   resolved.    Metabolic Encephalopathy   Improving  Due to Sepsis       Thrombocytopenia  In the setting of Sepsis  Avoid LMWH  Hematology (Dr. Madera)    HTN  Hold anti-HTN meds    BPH  Hold Flomax    Mouth ulcers likely from intubation.  magic mouth wash for pain.    Diet  soft    DVT Prophylaxis  SCD    Disposition  Full Code/Inpatient  Discharge planning pending hospital course   CC Time spent >45 minutes   Downgrade out of SPCU     Plan of care was discuss with patient and family, all questions were answered, seems understand and in agreement.      Plan of care was discuss with patient, all questions were answered, seems understand and in agreement.

## 2021-05-13 NOTE — PROGRESS NOTE ADULT - ASSESSMENT
81 year old male with a history of HTN, HL, BPH who was admitted with urosepsis, strep bacteremia, course c/b septic shock and respiratory failure with normal LFTs on admission, then with elevated T bili, liver enzymes followed by improvement but with later peaking of ALP, this may be secondary to sepsis alone, with strep bacteremia.  On prior imaging, no evidence of cholangitis (admittedly on non contrast CT), and only non specific gallbladder wall thickening with sludge.  No obvious intra abdominal source seen on CT such as abscess. Repeat cultures  have been NGTD, but there has been persistent leukocytosis, improving today.  HIDA scan results noted- normal  no evidence of biliary source/obstruction, liver tests trending down, likely was secondary to severe sepsis      1. Advance diet as tolerated   2. continue with IV Abx  3. supportive care as per primary team    Imani Mcfarlane MD  Gastroenterology  [facility]  [address] [place] [address] [phone]

## 2021-05-13 NOTE — PROGRESS NOTE ADULT - SUBJECTIVE AND OBJECTIVE BOX
Seen in ICU, resting, + NC O2, + arrington     Vital Signs Last 24 Hrs  T(C): 37.1 (05-13-21 @ 16:15), Max: 37.1 (05-13-21 @ 16:15)  T(F): 98.7 (05-13-21 @ 16:15), Max: 98.7 (05-13-21 @ 16:15)  HR: 67 (05-13-21 @ 16:00) (64 - 78)  BP: 120/68 (05-13-21 @ 16:00) (120/68 - 150/77)  BP(mean): 82 (05-13-21 @ 16:00) (82 - 100)  RR: 13 (05-13-21 @ 16:00) (13 - 20)  SpO2: 99% (05-13-21 @ 16:00) (93% - 100%)    I&O's Detail    12 May 2021 07:01  -  13 May 2021 07:00  --------------------------------------------------------  IN:    IV PiggyBack: 300 mL    Oral Fluid: 420 mL  Total IN: 720 mL    OUT:    Indwelling Catheter - Urethral (mL): 2500 mL  Total OUT: 2500 mL    Total NET: -1780 mL    13 May 2021 07:01  -  13 May 2021 18:00  --------------------------------------------------------  IN:    IV PiggyBack: 400 mL    Oral Fluid: 320 mL  Total IN: 720 mL    OUT:    Indwelling Catheter - Urethral (mL): 1200 mL  Total OUT: 1200 mL    Total NET: -480 mL    Respiratory: b/l air entry  Cardiovascular: S1 S2  Gastrointestinal: soft  Extremities: + edema b/l LE                        11.2   16.55 )-----------( 75       ( 13 May 2021 06:53 )             33.5     13 May 2021 06:53    140    |  105    |  29     ----------------------------<  99     3.0     |  30     |  1.17     Ca    7.8        13 May 2021 06:53  Phos  2.0       12 May 2021 06:10  Mg     2.0       12 May 2021 06:10    TPro  5.0    /  Alb  1.8    /  TBili  1.2    /  DBili  x      /  AST  40     /  ALT  81     /  AlkPhos  488    13 May 2021 06:53    LIVER FUNCTIONS - ( 13 May 2021 06:53 )  Alb: 1.8 g/dL / Pro: 5.0 g/dL / ALK PHOS: 488 U/L / ALT: 81 U/L DA / AST: 40 U/L / GGT: x           acetaminophen   Tablet .. 650 milliGRAM(s) Oral every 6 hours PRN  ALBUTerol    0.083% 2.5 milliGRAM(s) Nebulizer every 8 hours  chlorhexidine 0.12% Liquid 15 milliLiter(s) Oral Mucosa two times a day  chlorhexidine 2% Cloths 1 Application(s) Topical daily  dorzolamide 2%/timolol 0.5% Ophthalmic Solution 1 Drop(s) Both EYES two times a day  ferrous    sulfate 325 milliGRAM(s) Oral daily  FIRST- Mouthwash  BLM 5 milliLiter(s) Swish and Spit three times a day  heparin   Injectable 5000 Unit(s) SubCutaneous every 12 hours  latanoprost 0.005% Ophthalmic Solution 1 Drop(s) Both EYES at bedtime  pantoprazole  Injectable 40 milliGRAM(s) IV Push daily  piperacillin/tazobactam IVPB.. 3.375 Gram(s) IV Intermittent every 8 hours  potassium phosphate / sodium phosphate Powder (PHOS-NaK) 1 Packet(s) Oral three times a day    Assessment and Plan:   	  S/p HORACIO  S/p Resp failure  S/p sepsis, shock, strep bacteremia  Improved renal fx  Avoid nephrotoxins  Suppl K, Phos  F/u MUNA, UO    [phone]

## 2021-05-13 NOTE — PROGRESS NOTE ADULT - SUBJECTIVE AND OBJECTIVE BOX
Date/Time Patient Seen:  		  Referring MD:   Data Reviewed	       Patient is a 81y old  Male who presents with a chief complaint of hematuria, nausea, weakness (12 May 2021 15:13)      Subjective/HPI     PAST MEDICAL & SURGICAL HISTORY:  Hypertension    Hyperlipidemia    BPH (benign prostatic hyperplasia)    History of prostate surgery          Medication list         MEDICATIONS  (STANDING):  ALBUTerol    0.083% 2.5 milliGRAM(s) Nebulizer every 8 hours  chlorhexidine 0.12% Liquid 15 milliLiter(s) Oral Mucosa two times a day  chlorhexidine 2% Cloths 1 Application(s) Topical daily  dorzolamide 2%/timolol 0.5% Ophthalmic Solution 1 Drop(s) Both EYES two times a day  ferrous    sulfate 325 milliGRAM(s) Oral daily  latanoprost 0.005% Ophthalmic Solution 1 Drop(s) Both EYES at bedtime  pantoprazole  Injectable 40 milliGRAM(s) IV Push daily  piperacillin/tazobactam IVPB.. 3.375 Gram(s) IV Intermittent every 8 hours    MEDICATIONS  (PRN):  acetaminophen   Tablet .. 650 milliGRAM(s) Oral every 6 hours PRN Temp greater or equal to 38C (100.4F), Mild Pain (1 - 3)         Vitals log        ICU Vital Signs Last 24 Hrs  T(C): 36.4 (13 May 2021 04:00), Max: 37.2 (12 May 2021 15:43)  T(F): 97.6 (13 May 2021 04:00), Max: 98.9 (12 May 2021 15:43)  HR: 68 (13 May 2021 06:00) (58 - 81)  BP: 139/72 (13 May 2021 06:00) (125/71 - 146/74)  BP(mean): 92 (13 May 2021 06:00) (83 - 95)  ABP: --  ABP(mean): --  RR: 16 (13 May 2021 06:00) (13 - 20)  SpO2: 98% (13 May 2021 06:00) (93% - 100%)           Input and Output:  I&O's Detail    12 May 2021 07:01  -  13 May 2021 07:00  --------------------------------------------------------  IN:    IV PiggyBack: 300 mL    Oral Fluid: 420 mL  Total IN: 720 mL    OUT:    Indwelling Catheter - Urethral (mL): 2500 mL  Total OUT: 2500 mL    Total NET: -1780 mL          Lab Data                        11.4   22.05 )-----------( 47       ( 12 May 2021 06:10 )             34.1     05-12    141  |  108  |  39<H>  ----------------------------<  92  3.5   |  27  |  1.31<H>    Ca    8.1<L>      12 May 2021 06:10  Phos  2.0     05-12  Mg     2.0     05-12    TPro  5.3<L>  /  Alb  1.9<L>  /  TBili  1.1  /  DBili  x   /  AST  55<H>  /  ALT  85<H>  /  AlkPhos  519<H>  05-12            Review of Systems	      Objective     Physical Examination    heart s1s2  lung dec BS  abd soft      Pertinent Lab findings & Imaging      Christopher:  NO   Adequate UO     I&O's Detail    12 May 2021 07:01  -  13 May 2021 07:00  --------------------------------------------------------  IN:    IV PiggyBack: 300 mL    Oral Fluid: 420 mL  Total IN: 720 mL    OUT:    Indwelling Catheter - Urethral (mL): 2500 mL  Total OUT: 2500 mL    Total NET: -1780 mL               Discussed with:     Cultures:	        Radiology

## 2021-05-13 NOTE — DISCHARGE NOTE NURSING/CASE MANAGEMENT/SOCIAL WORK - NSSCCARECORD_GEN_ALL_CORE
[facility] Medical Equipment Agency/Community [facility]

## 2021-05-13 NOTE — PROGRESS NOTE ADULT - ASSESSMENT
The patient is an 81 year old male with a history of HTN, HL, BPH who was admitted with urosepsis, strep bacteremia, course c/b septic shock and respiratory failure.    Plan:  - Blood cultures positive for strep anginosus - unlikely cause of endocarditis  - Echo with normal LV systolic function, aortic valve sclerosis, right sided dilatation with reduced function  - No obvious endocarditis on TTE but limited by calcified aortic valve  - Last cultures NGTD  - If cultures remain positive, can evaluate further with ERICK  - CT chest with pulm edema and small pleural effusions  - Can dose furosemide prn  - BNP improving  - On zosyn  - HIDA negative

## 2021-05-13 NOTE — PROGRESS NOTE ADULT - SUBJECTIVE AND OBJECTIVE BOX
Chief Complaint: Hematuria, fevers    Interval Events: No events overnight.    Review of Systems:  General: No fevers, chills, weight loss or gain  Skin: No rashes, color changes  Cardiovascular: No chest pain, orthopnea  Respiratory: No shortness of breath, cough  Gastrointestinal: No nausea, abdominal pain  Genitourinary: No incontinence, pain with urination  Musculoskeletal: No pain, swelling, decreased range of motion  Neurological: No headache, weakness  Psychiatric: No depression, anxiety  Endocrine: No weight loss or gain, increased thirst  All other systems are comprehensively negative.    Physical Exam:  Vital Signs Last 24 Hrs  T(C): 36.4 (13 May 2021 04:00), Max: 37.2 (12 May 2021 15:43)  T(F): 97.6 (13 May 2021 04:00), Max: 98.9 (12 May 2021 15:43)  HR: 73 (13 May 2021 08:00) (63 - 81)  BP: 128/83 (13 May 2021 08:00) (125/71 - 146/74)  BP(mean): 96 (13 May 2021 08:00) (83 - 96)  RR: 15 (13 May 2021 08:00) (13 - 20)  SpO2: 98% (13 May 2021 08:00) (93% - 100%)  General: NAD  HEENT: MMM  Neck: No JVD, no carotid bruit  Lungs: CTAB  CV: RRR, nl S1/S2, no M/R/G  Abdomen: S/NT/ND, +BS  Extremities: No LE edema, no cyanosis  Neuro: AAOx3, non-focal  Skin: No rash    Labs:    05-13    140  |  105  |  29<H>  ----------------------------<  99  3.0<L>   |  30  |  1.17    Ca    7.8<L>      13 May 2021 06:53  Phos  2.0     05-12  Mg     2.0     05-12    TPro  5.0<L>  /  Alb  1.8<L>  /  TBili  1.2  /  DBili  x   /  AST  40  /  ALT  81<H>  /  AlkPhos  488<H>  05-13                        x      16.55 )-----------( x        ( 13 May 2021 06:53 )             x          Telemetry: Sinus rhythm, PVCs, ventricular triplet

## 2021-05-13 NOTE — PROGRESS NOTE ADULT - SUBJECTIVE AND OBJECTIVE BOX
Patient is a 81y old  Male who presents with a chief complaint of hematuria, nausea, weakness (13 May 2021 09:33)      INTERVAL HPI/OVERNIGHT EVENTS:  Pt is seen and examined.  Patient do speak some english.  History obtained with . # [id number]  c/o mouth ulcers and pain.    Pain Location & Control:     MEDICATIONS  (STANDING):  ALBUTerol    0.083% 2.5 milliGRAM(s) Nebulizer every 8 hours  chlorhexidine 0.12% Liquid 15 milliLiter(s) Oral Mucosa two times a day  chlorhexidine 2% Cloths 1 Application(s) Topical daily  dorzolamide 2%/timolol 0.5% Ophthalmic Solution 1 Drop(s) Both EYES two times a day  ferrous    sulfate 325 milliGRAM(s) Oral daily  FIRST- Mouthwash  BLM 5 milliLiter(s) Swish and Spit three times a day  latanoprost 0.005% Ophthalmic Solution 1 Drop(s) Both EYES at bedtime  pantoprazole  Injectable 40 milliGRAM(s) IV Push daily  piperacillin/tazobactam IVPB.. 3.375 Gram(s) IV Intermittent every 8 hours  potassium phosphate / sodium phosphate Powder (PHOS-NaK) 1 Packet(s) Oral three times a day    MEDICATIONS  (PRN):  acetaminophen   Tablet .. 650 milliGRAM(s) Oral every 6 hours PRN Temp greater or equal to 38C (100.4F), Mild Pain (1 - 3)      Allergies    No Known Allergies    Intolerances      Vital Signs Last 24 Hrs  T(C): 36.6 (13 May 2021 08:30), Max: 37.2 (12 May 2021 15:43)  T(F): 97.9 (13 May 2021 08:30), Max: 98.9 (12 May 2021 15:43)  HR: 71 (13 May 2021 12:00) (63 - 81)  BP: 150/77 (13 May 2021 12:00) (124/71 - 150/77)  BP(mean): 100 (13 May 2021 12:00) (83 - 100)  RR: 16 (13 May 2021 12:00) (13 - 20)  SpO2: 99% (13 May 2021 12:00) (93% - 100%)        LABS:                        11.2   16.55 )-----------( 75       ( 13 May 2021 06:53 )             33.5     13 May 2021 06:53    140    |  105    |  29     ----------------------------<  99     3.0     |  30     |  1.17     Ca    7.8        13 May 2021 06:53    TPro  5.0    /  Alb  1.8    /  TBili  1.2    /  DBili  x      /  AST  40     /  ALT  81     /  AlkPhos  488    13 May 2021 06:53    CAPILLARY BLOOD GLUCOSE      Cultures  Culture Results:   Normal Respiratory Mckinley present (05-09 @ 20:53)  Culture Results:   No growth to date. (05-08 @ 22:09)  Culture Results:   No growth to date. (05-08 @ 22:09)  Culture Results:   No growth (05-08 @ 22:00)  Culture Results:   Growth in anaerobic bottle: Streptococcus anginosus  See previous culture [id number] (05-07 @ 13:22)  Culture Results:   Growth in aerobic bottle: Streptococcus anginosus  Growth in anaerobic bottle: Peptoniphilus harei group "Susceptibilities  not performed"  ***Blood Panel PCR results on this specimen are available  approximately 3 hours after the Gram stain result.***  Gram stain, PCR, and/or culture results may not always  correspond due to difference in methodologies.  ************************************************************  This PCR assay was performed by multiplex PCR. This  Assay tests for 66 bacterial and resistance gene targets.  Please refer to the [facility] test directory  at [email or web address] for details. (05-07 @ 13:22)  Culture Results:   Normal Urogenital mckinley present (05-07 @ 13:22)        Culture - Sputum (collected 05-09-21 @ 20:53)  Source: .Sputum Sputum  Gram Stain (05-09-21 @ 22:52):    Rare polymorphonuclear leukocytes per low power field    Few Squamous epithelial cells per low power field    No organisms seen per oil power field  Final Report (05-11-21 @ 19:00):    Normal Respiratory Mckinley present    Culture - Blood (collected 05-08-21 @ 22:09)  Source: .Blood Blood-Venous  Preliminary Report (05-09-21 @ 23:01):    No growth to date.    Culture - Blood (collected 05-08-21 @ 22:09)  Source: .Blood Blood-Peripheral  Preliminary Report (05-09-21 @ 23:01):    No growth to date.    Culture - Urine (collected 05-08-21 @ 22:00)  Source: .Urine Catheterized  Final Report (05-09-21 @ 17:11):    No growth    Culture - Urine (collected 05-07-21 @ 13:22)  Source: .Urine Clean Catch (Midstream)  Final Report (05-08-21 @ 23:17):    Normal Urogenital mckinley present    Culture - Blood (collected 05-07-21 @ 13:22)  Source: .Blood Blood-Peripheral  Gram Stain (05-09-21 @ 09:40):    Growth in aerobic bottle: Gram Positive Cocci in Pairs and Chains    Growth in anaerobic bottle: Gram positive cocci in pairs  Final Report (05-10-21 @ 16:17):    Growth in aerobic bottle: Streptococcus anginosus    Growth in anaerobic bottle: Peptoniphilus harei group "Susceptibilities    not performed"    ***Blood Panel PCR results on this specimen are available    approximately 3 hours after the Gram stain result.***    Gram stain, PCR, and/or culture results may not always    correspond due to difference in methodologies.    ************************************************************    This PCR assay was performed by multiplex PCR. This    Assay tests for 66 bacterial and resistance gene targets.    Please refer to the [facility] test directory    at [email or web address] for details.  Organism: Blood Culture PCR  Streptococcus anginosus (05-10-21 @ 16:17)  Organism: Streptococcus anginosus (05-10-21 @ 16:17)      -  Ceftriaxone: S 0.125      -  Penicillin: S 0.047      Method Type: ETEST  Organism: Streptococcus anginosus (05-10-21 @ 16:17)      -  Clindamycin: S      -  Erythromycin: S      -  Levofloxacin: S      -  Vancomycin: S      Method Type: KB  Organism: Blood Culture PCR (05-10-21 @ 16:17)      -  Streptococcus anginosus group: Detec      Method Type: PCR    Culture - Blood (collected 05-07-21 @ 13:22)  Source: .Blood Blood-Peripheral  Gram Stain (05-08-21 @ 22:10):    Growth in anaerobic bottle: Gram Positive Cocci in Pairs and Chains  Final Report (05-09-21 @ 19:22):    Growth in anaerobic bottle: Streptococcus anginosus    See previous culture [id number]        RADIOLOGY & ADDITIONAL TESTS:    Imaging Personally Reviewed:  [ ] YES  [ ] NO    Consultant(s) Notes Reviewed:  [ ] YES  [ ] NO    Care Discussed with Consultants/Other Providers [x ] YES  [ ] NO

## 2021-05-13 NOTE — PROGRESS NOTE ADULT - SUBJECTIVE AND OBJECTIVE BOX
Chief Complaint:  Patient is a 81y old  Male who presents with a chief complaint of hematuria, nausea, weakness (13 May 2021 07:11)      Interval Events:   no complaints    Hospital Medications:  acetaminophen   Tablet .. 650 milliGRAM(s) Oral every 6 hours PRN  ALBUTerol    0.083% 2.5 milliGRAM(s) Nebulizer every 8 hours  chlorhexidine 0.12% Liquid 15 milliLiter(s) Oral Mucosa two times a day  chlorhexidine 2% Cloths 1 Application(s) Topical daily  dorzolamide 2%/timolol 0.5% Ophthalmic Solution 1 Drop(s) Both EYES two times a day  ferrous    sulfate 325 milliGRAM(s) Oral daily  latanoprost 0.005% Ophthalmic Solution 1 Drop(s) Both EYES at bedtime  pantoprazole  Injectable 40 milliGRAM(s) IV Push daily  piperacillin/tazobactam IVPB.. 3.375 Gram(s) IV Intermittent every 8 hours  potassium chloride  10 mEq/100 mL IVPB 10 milliEquivalent(s) IV Intermittent every 1 hour        PHYSICAL EXAM:   Vital Signs:  Vital Signs Last 24 Hrs  T(C): 36.6 (13 May 2021 08:30), Max: 37.2 (12 May 2021 15:43)  T(F): 97.9 (13 May 2021 08:30), Max: 98.9 (12 May 2021 15:43)  HR: 73 (13 May 2021 08:00) (63 - 81)  BP: 128/83 (13 May 2021 08:00) (125/71 - 146/74)  BP(mean): 96 (13 May 2021 08:00) (83 - 96)  RR: 15 (13 May 2021 08:00) (13 - 20)  SpO2: 98% (13 May 2021 08:00) (93% - 100%)  Daily     Daily Weight in k.6 (13 May 2021 04:00)    GENERAL:  Appears stated age,  no distress  HEENT:   sclera -anicteric  CHEST:   no increased effort, breath sounds clear  HEART:  Regular rhythm, S1, S2,   ABDOMEN:  Soft, non-tender, non-distended, normoactive bowel sounds,    EXTREMITIES:  no cyanosis, clubbing or edema  SKIN:  No rash/no jaundice   NEURO:  Alert, oriented    LABS:                        x      16.55 )-----------( x        ( 13 May 2021 06:53 )             x          05-13    140  |  105  |  29<H>  ----------------------------<  99  3.0<L>   |  30  |  1.17    Ca    7.8<L>      13 May 2021 06:53  Phos  2.0     05-12  Mg     2.0     05-12    TPro  5.0<L>  /  Alb  1.8<L>  /  TBili  1.2  /  DBili  x   /  AST  40  /  ALT  81<H>  /  AlkPhos  488<H>  13    LIVER FUNCTIONS - ( 13 May 2021 06:53 )  Alb: 1.8 g/dL / Pro: 5.0 g/dL / ALK PHOS: 488 U/L / ALT: 81 U/L DA / AST: 40 U/L / GGT: x                                       x      16.55 )-----------( x        ( 13 May 2021 06:53 )             x                            11.4   22.05 )-----------( 47       ( 12 May 2021 06:10 )             34.1                         10.7   29.80 )-----------( 44       ( 11 May 2021 07:06 )             33.0     Imaging:

## 2021-05-13 NOTE — DISCHARGE NOTE NURSING/CASE MANAGEMENT/SOCIAL WORK - PATIENT PORTAL LINK FT
You can access the FollowMyHealth Patient Portal offered by [facility] by registering at the following website: [email or web address]. By joining [facility]’s FollowMyHealth portal, you will also be able to view your health information using other applications (apps) compatible with our system.

## 2021-05-14 ENCOUNTER — TRANSCRIPTION ENCOUNTER (OUTPATIENT)
Age: 81
End: 2021-05-14

## 2021-05-14 VITALS
DIASTOLIC BLOOD PRESSURE: 70 MMHG | OXYGEN SATURATION: 95 % | RESPIRATION RATE: 14 BRPM | SYSTOLIC BLOOD PRESSURE: 120 MMHG | HEART RATE: 76 BPM

## 2021-05-14 LAB
ALBUMIN SERPL ELPH-MCNC: 2 G/DL — LOW (ref 3.3–5)
ALP SERPL-CCNC: 490 U/L — HIGH (ref 30–120)
ALT FLD-CCNC: 80 U/L DA — HIGH (ref 10–60)
ANION GAP SERPL CALC-SCNC: 6 MMOL/L — SIGNIFICANT CHANGE UP (ref 5–17)
AST SERPL-CCNC: 40 U/L — SIGNIFICANT CHANGE UP (ref 10–40)
BASOPHILS # BLD AUTO: 0.03 K/UL — SIGNIFICANT CHANGE UP (ref 0–0.2)
BASOPHILS NFR BLD AUTO: 0.2 % — SIGNIFICANT CHANGE UP (ref 0–2)
BILIRUB SERPL-MCNC: 1.1 MG/DL — SIGNIFICANT CHANGE UP (ref 0.2–1.2)
BUN SERPL-MCNC: 22 MG/DL — SIGNIFICANT CHANGE UP (ref 7–23)
CALCIUM SERPL-MCNC: 8.1 MG/DL — LOW (ref 8.4–10.5)
CHLORIDE SERPL-SCNC: 102 MMOL/L — SIGNIFICANT CHANGE UP (ref 96–108)
CO2 SERPL-SCNC: 30 MMOL/L — SIGNIFICANT CHANGE UP (ref 22–31)
CREAT SERPL-MCNC: 0.95 MG/DL — SIGNIFICANT CHANGE UP (ref 0.5–1.3)
EOSINOPHIL # BLD AUTO: 0.31 K/UL — SIGNIFICANT CHANGE UP (ref 0–0.5)
EOSINOPHIL NFR BLD AUTO: 2.1 % — SIGNIFICANT CHANGE UP (ref 0–6)
GLUCOSE SERPL-MCNC: 109 MG/DL — HIGH (ref 70–99)
HCT VFR BLD CALC: 32.9 % — LOW (ref 39–50)
HGB BLD-MCNC: 11.2 G/DL — LOW (ref 13–17)
IMM GRANULOCYTES NFR BLD AUTO: 5.3 % — HIGH (ref 0–1.5)
LYMPHOCYTES # BLD AUTO: 1.74 K/UL — SIGNIFICANT CHANGE UP (ref 1–3.3)
LYMPHOCYTES # BLD AUTO: 11.8 % — LOW (ref 13–44)
MAGNESIUM SERPL-MCNC: 1.5 MG/DL — LOW (ref 1.6–2.6)
MCHC RBC-ENTMCNC: 29.6 PG — SIGNIFICANT CHANGE UP (ref 27–34)
MCHC RBC-ENTMCNC: 34 GM/DL — SIGNIFICANT CHANGE UP (ref 32–36)
MCV RBC AUTO: 87 FL — SIGNIFICANT CHANGE UP (ref 80–100)
MONOCYTES # BLD AUTO: 1.62 K/UL — HIGH (ref 0–0.9)
MONOCYTES NFR BLD AUTO: 10.9 % — SIGNIFICANT CHANGE UP (ref 2–14)
NEUTROPHILS # BLD AUTO: 10.32 K/UL — HIGH (ref 1.8–7.4)
NEUTROPHILS NFR BLD AUTO: 69.7 % — SIGNIFICANT CHANGE UP (ref 43–77)
NRBC # BLD: 0 /100 WBCS — SIGNIFICANT CHANGE UP (ref 0–0)
PHOSPHATE SERPL-MCNC: 2.9 MG/DL — SIGNIFICANT CHANGE UP (ref 2.5–4.5)
PLATELET # BLD AUTO: 145 K/UL — LOW (ref 150–400)
POTASSIUM SERPL-MCNC: 3.4 MMOL/L — LOW (ref 3.5–5.3)
POTASSIUM SERPL-SCNC: 3.4 MMOL/L — LOW (ref 3.5–5.3)
PROT SERPL-MCNC: 5.4 G/DL — LOW (ref 6–8.3)
RBC # BLD: 3.78 M/UL — LOW (ref 4.2–5.8)
RBC # FLD: 12.9 % — SIGNIFICANT CHANGE UP (ref 10.3–14.5)
SODIUM SERPL-SCNC: 138 MMOL/L — SIGNIFICANT CHANGE UP (ref 135–145)
WBC # BLD: 14.8 K/UL — HIGH (ref 3.8–10.5)
WBC # FLD AUTO: 14.8 K/UL — HIGH (ref 3.8–10.5)

## 2021-05-14 PROCEDURE — 80053 COMPREHEN METABOLIC PANEL: CPT

## 2021-05-14 PROCEDURE — 84100 ASSAY OF PHOSPHORUS: CPT

## 2021-05-14 PROCEDURE — 81001 URINALYSIS AUTO W/SCOPE: CPT

## 2021-05-14 PROCEDURE — 74176 CT ABD & PELVIS W/O CONTRAST: CPT

## 2021-05-14 PROCEDURE — 83605 ASSAY OF LACTIC ACID: CPT

## 2021-05-14 PROCEDURE — 78226 HEPATOBILIARY SYSTEM IMAGING: CPT

## 2021-05-14 PROCEDURE — 96365 THER/PROPH/DIAG IV INF INIT: CPT

## 2021-05-14 PROCEDURE — 94799 UNLISTED PULMONARY SVC/PX: CPT

## 2021-05-14 PROCEDURE — 82962 GLUCOSE BLOOD TEST: CPT

## 2021-05-14 PROCEDURE — 94760 N-INVAS EAR/PLS OXIMETRY 1: CPT

## 2021-05-14 PROCEDURE — 83735 ASSAY OF MAGNESIUM: CPT

## 2021-05-14 PROCEDURE — 97110 THERAPEUTIC EXERCISES: CPT

## 2021-05-14 PROCEDURE — 82607 VITAMIN B-12: CPT

## 2021-05-14 PROCEDURE — 74150 CT ABDOMEN W/O CONTRAST: CPT

## 2021-05-14 PROCEDURE — U0003: CPT

## 2021-05-14 PROCEDURE — 86769 SARS-COV-2 COVID-19 ANTIBODY: CPT

## 2021-05-14 PROCEDURE — 94640 AIRWAY INHALATION TREATMENT: CPT

## 2021-05-14 PROCEDURE — 36415 COLL VENOUS BLD VENIPUNCTURE: CPT

## 2021-05-14 PROCEDURE — 84155 ASSAY OF PROTEIN SERUM: CPT

## 2021-05-14 PROCEDURE — A9537: CPT

## 2021-05-14 PROCEDURE — 82728 ASSAY OF FERRITIN: CPT

## 2021-05-14 PROCEDURE — 85384 FIBRINOGEN ACTIVITY: CPT

## 2021-05-14 PROCEDURE — 84165 PROTEIN E-PHORESIS SERUM: CPT

## 2021-05-14 PROCEDURE — 94660 CPAP INITIATION&MGMT: CPT

## 2021-05-14 PROCEDURE — 87040 BLOOD CULTURE FOR BACTERIA: CPT

## 2021-05-14 PROCEDURE — 85025 COMPLETE CBC W/AUTO DIFF WBC: CPT

## 2021-05-14 PROCEDURE — 87150 DNA/RNA AMPLIFIED PROBE: CPT

## 2021-05-14 PROCEDURE — 82272 OCCULT BLD FECES 1-3 TESTS: CPT

## 2021-05-14 PROCEDURE — 97116 GAIT TRAINING THERAPY: CPT

## 2021-05-14 PROCEDURE — 97161 PT EVAL LOW COMPLEX 20 MIN: CPT

## 2021-05-14 PROCEDURE — 85045 AUTOMATED RETICULOCYTE COUNT: CPT

## 2021-05-14 PROCEDURE — 36600 WITHDRAWAL OF ARTERIAL BLOOD: CPT

## 2021-05-14 PROCEDURE — 71250 CT THORAX DX C-: CPT

## 2021-05-14 PROCEDURE — 94003 VENT MGMT INPAT SUBQ DAY: CPT

## 2021-05-14 PROCEDURE — 83880 ASSAY OF NATRIURETIC PEPTIDE: CPT

## 2021-05-14 PROCEDURE — 83615 LACTATE (LD) (LDH) ENZYME: CPT

## 2021-05-14 PROCEDURE — 94002 VENT MGMT INPAT INIT DAY: CPT

## 2021-05-14 PROCEDURE — U0005: CPT

## 2021-05-14 PROCEDURE — 93005 ELECTROCARDIOGRAM TRACING: CPT

## 2021-05-14 PROCEDURE — 70450 CT HEAD/BRAIN W/O DYE: CPT

## 2021-05-14 PROCEDURE — 87070 CULTURE OTHR SPECIMN AEROBIC: CPT

## 2021-05-14 PROCEDURE — 85730 THROMBOPLASTIN TIME PARTIAL: CPT

## 2021-05-14 PROCEDURE — 93306 TTE W/DOPPLER COMPLETE: CPT

## 2021-05-14 PROCEDURE — 87181 SC STD AGAR DILUTION PER AGT: CPT

## 2021-05-14 PROCEDURE — 76700 US EXAM ABDOM COMPLETE: CPT

## 2021-05-14 PROCEDURE — 85027 COMPLETE CBC AUTOMATED: CPT

## 2021-05-14 PROCEDURE — 71045 X-RAY EXAM CHEST 1 VIEW: CPT

## 2021-05-14 PROCEDURE — 83540 ASSAY OF IRON: CPT

## 2021-05-14 PROCEDURE — 99497 ADVNCD CARE PLAN 30 MIN: CPT

## 2021-05-14 PROCEDURE — 87635 SARS-COV-2 COVID-19 AMP PRB: CPT

## 2021-05-14 PROCEDURE — 87077 CULTURE AEROBIC IDENTIFY: CPT

## 2021-05-14 PROCEDURE — 96366 THER/PROPH/DIAG IV INF ADDON: CPT

## 2021-05-14 PROCEDURE — 83550 IRON BINDING TEST: CPT

## 2021-05-14 PROCEDURE — 87184 SC STD DISK METHOD PER PLATE: CPT

## 2021-05-14 PROCEDURE — 82746 ASSAY OF FOLIC ACID SERUM: CPT

## 2021-05-14 PROCEDURE — 85610 PROTHROMBIN TIME: CPT

## 2021-05-14 PROCEDURE — 31500 INSERT EMERGENCY AIRWAY: CPT

## 2021-05-14 PROCEDURE — 99285 EMERGENCY DEPT VISIT HI MDM: CPT

## 2021-05-14 PROCEDURE — 84443 ASSAY THYROID STIM HORMONE: CPT

## 2021-05-14 PROCEDURE — 87086 URINE CULTURE/COLONY COUNT: CPT

## 2021-05-14 PROCEDURE — 72192 CT PELVIS W/O DYE: CPT

## 2021-05-14 PROCEDURE — 82803 BLOOD GASES ANY COMBINATION: CPT

## 2021-05-14 PROCEDURE — 83010 ASSAY OF HAPTOGLOBIN QUANT: CPT

## 2021-05-14 PROCEDURE — 80048 BASIC METABOLIC PNL TOTAL CA: CPT

## 2021-05-14 PROCEDURE — 99239 HOSP IP/OBS DSCHRG MGMT >30: CPT

## 2021-05-14 RX ORDER — ACETAMINOPHEN 500 MG
2 TABLET ORAL
Qty: 0 | Refills: 0 | DISCHARGE
Start: 2021-05-14

## 2021-05-14 RX ORDER — ATENOLOL 25 MG/1
20 TABLET ORAL
Qty: 0 | Refills: 0 | DISCHARGE

## 2021-05-14 RX ORDER — DIPHENHYDRAMINE HYDROCHLORIDE AND LIDOCAINE HYDROCHLORIDE AND ALUMINUM HYDROXIDE AND MAGNESIUM HYDRO
5 KIT
Qty: 0 | Refills: 0 | DISCHARGE
Start: 2021-05-14

## 2021-05-14 RX ORDER — DORZOLAMIDE HYDROCHLORIDE TIMOLOL MALEATE 20; 5 MG/ML; MG/ML
1 SOLUTION/ DROPS OPHTHALMIC
Qty: 0 | Refills: 0 | DISCHARGE
Start: 2021-05-14

## 2021-05-14 RX ORDER — CEFPODOXIME PROXETIL 100 MG
1 TABLET ORAL
Qty: 14 | Refills: 0
Start: 2021-05-14 | End: 2021-05-20

## 2021-05-14 RX ORDER — FERROUS SULFATE 325(65) MG
1 TABLET ORAL
Qty: 30 | Refills: 0
Start: 2021-05-14 | End: 2021-06-12

## 2021-05-14 RX ORDER — MAGNESIUM SULFATE 500 MG/ML
2 VIAL (ML) INJECTION ONCE
Refills: 0 | Status: COMPLETED | OUTPATIENT
Start: 2021-05-14 | End: 2021-05-14

## 2021-05-14 RX ORDER — POTASSIUM CHLORIDE 20 MEQ
20 PACKET (EA) ORAL ONCE
Refills: 0 | Status: COMPLETED | OUTPATIENT
Start: 2021-05-14 | End: 2021-05-14

## 2021-05-14 RX ORDER — DIPHENHYDRAMINE HYDROCHLORIDE AND LIDOCAINE HYDROCHLORIDE AND ALUMINUM HYDROXIDE AND MAGNESIUM HYDRO
5 KIT
Qty: 150 | Refills: 0
Start: 2021-05-14 | End: 2021-05-23

## 2021-05-14 RX ORDER — LATANOPROST 0.05 MG/ML
1 SOLUTION/ DROPS OPHTHALMIC; TOPICAL
Qty: 0 | Refills: 0 | DISCHARGE
Start: 2021-05-14

## 2021-05-14 RX ORDER — CEFPODOXIME PROXETIL 100 MG
1 TABLET ORAL
Qty: 0 | Refills: 0 | DISCHARGE
Start: 2021-05-14

## 2021-05-14 RX ORDER — FERROUS SULFATE 325(65) MG
1 TABLET ORAL
Qty: 0 | Refills: 0 | DISCHARGE
Start: 2021-05-14

## 2021-05-14 RX ADMIN — Medication 1 PACKET(S): at 06:11

## 2021-05-14 RX ADMIN — Medication 1 PACKET(S): at 13:06

## 2021-05-14 RX ADMIN — PANTOPRAZOLE SODIUM 40 MILLIGRAM(S): 20 TABLET, DELAYED RELEASE ORAL at 11:06

## 2021-05-14 RX ADMIN — Medication 50 GRAM(S): at 08:17

## 2021-05-14 RX ADMIN — ALBUTEROL 2.5 MILLIGRAM(S): 90 AEROSOL, METERED ORAL at 08:41

## 2021-05-14 RX ADMIN — DIPHENHYDRAMINE HYDROCHLORIDE AND LIDOCAINE HYDROCHLORIDE AND ALUMINUM HYDROXIDE AND MAGNESIUM HYDRO 5 MILLILITER(S): KIT at 13:06

## 2021-05-14 RX ADMIN — DIPHENHYDRAMINE HYDROCHLORIDE AND LIDOCAINE HYDROCHLORIDE AND ALUMINUM HYDROXIDE AND MAGNESIUM HYDRO 5 MILLILITER(S): KIT at 06:12

## 2021-05-14 RX ADMIN — DORZOLAMIDE HYDROCHLORIDE TIMOLOL MALEATE 1 DROP(S): 20; 5 SOLUTION/ DROPS OPHTHALMIC at 06:11

## 2021-05-14 RX ADMIN — Medication 200 MILLIGRAM(S): at 06:11

## 2021-05-14 RX ADMIN — Medication 20 MILLIEQUIVALENT(S): at 08:17

## 2021-05-14 RX ADMIN — HEPARIN SODIUM 5000 UNIT(S): 5000 INJECTION INTRAVENOUS; SUBCUTANEOUS at 06:12

## 2021-05-14 RX ADMIN — Medication 325 MILLIGRAM(S): at 11:07

## 2021-05-14 NOTE — DISCHARGE NOTE PROVIDER - NSDCMRMEDTOKEN_GEN_ALL_CORE_FT
acetaminophen 325 mg oral tablet: 2 tab(s) orally every 6 hours, As needed, Temp greater or equal to 38C (100.4F), Mild Pain (1 - 3)  aspirin 81 mg oral tablet: 81 milligram(s) orally once a day  cefpodoxime 200 mg oral tablet: 1 tab(s) orally every 12 hours  ferrous sulfate 325 mg (65 mg elemental iron) oral tablet: 1 tab(s) orally once a day  Flomax 0.4 mg oral capsule: 1 cap(s) orally once a day  Lipitor 20 mg oral tablet: 1 tab(s) orally once a day  Chuck Flores.:    acetaminophen 325 mg oral tablet: 2 tab(s) orally every 6 hours, As needed, Temp greater or equal to 38C (100.4F), Mild Pain (1 - 3)  aspirin 81 mg oral tablet: 81 milligram(s) orally once a day  cefpodoxime 200 mg oral tablet: 1 tab(s) orally every 12 hours  diphenhydramine/lidocaine/aluminum hydroxide/magnesium hydroxide/simethicone mucous membrane suspension: 5 milliliter(s) mucous membrane 3 times a day (before meals)  dorzolamide-timolol 2%-0.5% preservative-free ophthalmic solution: 1 drop(s) to each affected eye 2 times a day  ferrous sulfate 325 mg (65 mg elemental iron) oral tablet: 1 tab(s) orally once a day  Flomax 0.4 mg oral capsule: 1 cap(s) orally once a day  latanoprost 0.005% ophthalmic solution: 1 drop(s) to each affected eye once a day (at bedtime)  Lipitor 20 mg oral tablet: 1 tab(s) orally once a day  Chuck Flores.:    acetaminophen 325 mg oral tablet: 2 tab(s) orally every 6 hours, As needed, Temp greater or equal to 38C (100.4F), Mild Pain (1 - 3)  aspirin 81 mg oral tablet: 81 milligram(s) orally once a day  cefpodoxime 200 mg oral tablet: 1 tab(s) orally every 12 hours  diphenhydramine/lidocaine/aluminum hydroxide/magnesium hydroxide/simethicone mucous membrane suspension: 5 milliliter(s) mucous membrane 3 times a day (before meals)  dorzolamide-timolol 2%-0.5% preservative-free ophthalmic solution: 1 drop(s) to each affected eye 2 times a day  ferrous sulfate 325 mg (65 mg elemental iron) oral tablet: 1 tab(s) orally once a day  Flomax 0.4 mg oral capsule: 1 cap(s) orally once a day  latanoprost 0.005% ophthalmic solution: 1 drop(s) to each affected eye once a day (at bedtime)  Lipitor 20 mg oral tablet: 1 tab(s) orally once a day

## 2021-05-14 NOTE — PROGRESS NOTE ADULT - SUBJECTIVE AND OBJECTIVE BOX
Chief Complaint: Hematuria, fevers    Interval Events: No events overnight.    Review of Systems:  General: No fevers, chills, weight loss or gain  Skin: No rashes, color changes  Cardiovascular: No chest pain, orthopnea  Respiratory: No shortness of breath, cough  Gastrointestinal: No nausea, abdominal pain  Genitourinary: No incontinence, pain with urination  Musculoskeletal: No pain, swelling, decreased range of motion  Neurological: No headache, weakness  Psychiatric: No depression, anxiety  Endocrine: No weight loss or gain, increased thirst  All other systems are comprehensively negative.    Physical Exam:  Vital Signs Last 24 Hrs  T(C): 36.9 (14 May 2021 07:12), Max: 37.2 (13 May 2021 21:22)  T(F): 98.4 (14 May 2021 07:12), Max: 99 (13 May 2021 21:22)  HR: 74 (14 May 2021 08:00) (62 - 80)  BP: 136/76 (14 May 2021 08:00) (120/68 - 150/77)  BP(mean): 94 (14 May 2021 08:00) (82 - 100)  RR: 15 (14 May 2021 08:00) (12 - 18)  SpO2: 94% (14 May 2021 08:00) (93% - 100%)  General: NAD  HEENT: MMM  Neck: No JVD, no carotid bruit  Lungs: CTAB  CV: RRR, nl S1/S2, no M/R/G  Abdomen: S/NT/ND, +BS  Extremities: No LE edema, no cyanosis  Neuro: AAOx3, non-focal  Skin: No rash    Labs:    05-14    138  |  102  |  22  ----------------------------<  109<H>  3.4<L>   |  30  |  0.95    Ca    8.1<L>      14 May 2021 06:14  Phos  2.9     05-14  Mg     1.5     05-14    TPro  5.4<L>  /  Alb  2.0<L>  /  TBili  1.1  /  DBili  x   /  AST  40  /  ALT  80<H>  /  AlkPhos  490<H>  05-14                        11.2   14.80 )-----------( 145      ( 14 May 2021 06:14 )             32.9       Telemetry: Sinus rhythm, PVCs, ventricular triplet

## 2021-05-14 NOTE — PROGRESS NOTE ADULT - ASSESSMENT
81 year old male with a history of HTN, HL, BPH who was admitted with urosepsis, strep bacteremia, course c/b septic shock and respiratory failure with normal LFTs on admission, then with elevated T bili, liver enzymes followed by improvement but with later peaking of ALP, this may be secondary to sepsis alone, with strep bacteremia.  On prior imaging, no evidence of cholangitis (admittedly on non contrast CT), and only non specific gallbladder wall thickening with sludge.  No obvious intra abdominal source seen on CT such as abscess. Repeat cultures  have been NGTD, but there has been persistent leukocytosis, improving   HIDA scan results noted- normal  no evidence of biliary source/obstruction, liver tests trending down, likely was secondary to severe sepsis      1. Advance diet as tolerated   2. continue with Abx  3. supportive care as per primary team    Imani Mcfarlane MD  Gastroenterology  [address] [address] [phone]

## 2021-05-14 NOTE — CONSULT NOTE ADULT - CONSULT REQUESTED DATE/TIME
07-May-2021 12:07
08-May-2021
14-May-2021 09:53
07-May-2021 20:28
08-May-2021 13:27
10-May-2021 09:37
12-May-2021 10:00
08-May-2021 11:39

## 2021-05-14 NOTE — CONSULT NOTE ADULT - CONSULT REASON
ACP
Thrombocytopenia
Acute cystits with hematuria
Acute respiratory failure, acute diastolic heart failure
sepsis, rule out biliary source
HORACIO
Urinary retention, sepsis, HORACIO, inability to catheterize patient
sepsis  shock  hematuria  n/v  OP  OA  GERD

## 2021-05-14 NOTE — CONSULT NOTE ADULT - SUBJECTIVE AND OBJECTIVE BOX
HPI:  81M with HTN, BPH who presents with hematuria and weakness.  Patient had a cystoscopy about 2 weeks ago (for evaluation for chronic bacteriuria) and since then has had intermittent hematuria.  Was given 3 days of an abx post-procedure.  Then today (Thursday), patient noted gross blood from his urine.  Patient also started to have chills, fevers, and nausea.  No pain.  No recent sick contacts.   COVID vaccine with Pfizer was done >1 month ago.  No other complaints.  Brought here for further evaluation.  In the ED, patient's triage VS were /65  HR 97  RR 24, 95%, T 101.2F.  Physical exam revealed that he was becoming altered (did not know where he was).  Labs were significant for leukopenia at WBC 0.78, Cr 1.34, lactate 2.3, and UA with +nitrite, mod LE with 0-2 WBC, large blood with >50 RBC, occasional bacteria.  Was started on ceftriaxone empirically.  CT renal showed possible cystitis, multiple bladder diverticula with likely layering calcifications within the posterior diverticula and along the posterior bladder wall, fullness in left renal pelvis.  Patient also had a head CT for AMS.  However, niece said that the patient was more lucid after the fluids and is no longer altered and responding accordingly.  Patient is being admitted for sepsis 2/2 UTI/ infection.   (07 May 2021 01:13)    PERTINENT PM/SXH:   Hypertension    Hyperlipidemia    BPH (benign prostatic hyperplasia)      History of prostate surgery      FAMILY HISTORY:    ITEMS NOT CHECKED ARE NOT PRESENT    SOCIAL HISTORY:   Significant other/partner[ ]  Children[ ]  Alevism/Spirituality:  Substance hx:  [ ]   Tobacco hx:  [ ]   Alcohol hx: [ ]   Home Opioid hx:  [ ] I-Stop Reference No:  Living Situation: [ ]Home  [ ]Long term care  [ ]Rehab [ ]Other    ADVANCE DIRECTIVES:    DNR  MOLST  [ ]  Living Will  [ ]   DECISION MAKER(s):  [ ] Health Care Proxy(s)  [ ] Surrogate(s)  [ ] Guardian           Name(s): Phone Number(s):    BASELINE (I)ADL(s) (prior to admission):  [place]: [ ]Total  [ ] Moderate [ ]Dependent    Allergies    No Known Allergies    Intolerances    MEDICATIONS  (STANDING):  ALBUTerol    0.083% 2.5 milliGRAM(s) Nebulizer every 8 hours  cefpodoxime 200 milliGRAM(s) Oral every 12 hours  dorzolamide 2%/timolol 0.5% Ophthalmic Solution 1 Drop(s) Both EYES two times a day  ferrous    sulfate 325 milliGRAM(s) Oral daily  FIRST- Mouthwash  BLM 5 milliLiter(s) Swish and Spit three times a day  heparin   Injectable 5000 Unit(s) SubCutaneous every 12 hours  latanoprost 0.005% Ophthalmic Solution 1 Drop(s) Both EYES at bedtime  pantoprazole  Injectable 40 milliGRAM(s) IV Push daily  potassium phosphate / sodium phosphate Powder (PHOS-NaK) 1 Packet(s) Oral three times a day    MEDICATIONS  (PRN):  acetaminophen   Tablet .. 650 milliGRAM(s) Oral every 6 hours PRN Temp greater or equal to 38C (100.4F), Mild Pain (1 - 3)    PRESENT SYMPTOMS: [ ]Unable to obtain due to poor mentation   Source if other than patient:  [ ]Family   [ ]Team     Pain: [ ]yes [ ]no  QOL impact -   Location -                    Aggravating factors -  Quality -  Radiation -  Timing-  Severity (0-10 scale):  Minimal acceptable level (0-10 scale):     CPOT:    https://www.[facility].org/getattachment/axp59o36-7s6o-2d2r-8a5o-3912i7871y1b/Critical-Care-Pain-Observation-Tool-(CPOT)      PAIN AD Score:     http://geriatrictoolkit.[facility]/cog/painad.pdf (press ctrl +  left click to view)    Dyspnea:                           [ ]Mild [ ]Moderate [ ]Severe  Anxiety:                             [ ]Mild [ ]Moderate [ ]Severe  Fatigue:                             [ ]Mild [ ]Moderate [ ]Severe  Nausea:                             [ ]Mild [ ]Moderate [ ]Severe  Loss of appetite:              [ ]Mild [ ]Moderate [ ]Severe  Constipation:                    [ ]Mild [ ]Moderate [ ]Severe    Other Symptoms:  [ ]All other review of systems negative     Palliative Performance Status Version 2:         %    http://npcrc.org/files/news/palliative_performance_scale_ppsv2.pdf  PHYSICAL EXAM:  Vital Signs Last 24 Hrs  T(C): 36.9 (14 May 2021 07:12), Max: 37.2 (13 May 2021 21:22)  T(F): 98.4 (14 May 2021 07:12), Max: 99 (13 May 2021 21:22)  HR: 72 (14 May 2021 08:49) (62 - 80)  BP: 136/76 (14 May 2021 08:00) (120/68 - 150/77)  BP(mean): 94 (14 May 2021 08:00) (82 - 100)  RR: 15 (14 May 2021 08:00) (12 - 18)  SpO2: 96% (14 May 2021 08:49) (93% - 100%) I&O's Summary    13 May 2021 07:01  -  14 May 2021 07:00  --------------------------------------------------------  IN: 820 mL / OUT: 2300 mL / NET: -1480 mL    14 May 2021 07:01  -  14 May 2021 09:54  --------------------------------------------------------  IN: 210 mL / OUT: 0 mL / NET: 210 mL      GENERAL:  [ ]Alert  [ ]Oriented x   [ ]Lethargic  [ ]Cachexia  [ ]Unarousable  [ ]Verbal  [ ]Non-Verbal  Behavioral:   [ ] Anxiety  [ ] Delirium [ ] Agitation [ ] Other  HEENT:  [ ]Normal   [ ]Dry mouth   [ ]ET Tube/Trach  [ ]Oral lesions  PULMONARY:   [ ]Clear [ ]Tachypnea  [ ]Audible excessive secretions   [ ]Rhonchi        [ ]Right [ ]Left [ ]Bilateral  [ ]Crackles        [ ]Right [ ]Left [ ]Bilateral  [ ]Wheezing     [ ]Right [ ]Left [ ]Bilateral  [ ]Diminished breath sounds [ ]right [ ]left [ ]bilateral  CARDIOVASCULAR:    [ ]Regular [ ]Irregular [ ]Tachy  [ ]Guy [ ]Murmur [ ]Other  GASTROINTESTINAL:  [ ]Soft  [ ]Distended   [ ]+BS  [ ]Non tender [ ]Tender  [ ]PEG [ ]OGT/ NGT  Last BM:     GENITOURINARY:  [ ]Normal [ ] Incontinent   [ ]Oliguria/Anuria   [ ]White  MUSCULOSKELETAL:   [ ]Normal   [ ]Weakness  [ ]Bed/Wheelchair bound [ ]Edema  NEUROLOGIC:   [ ]No focal deficits  [ ]Cognitive impairment  [ ]Dysphagia [ ]Dysarthria [ ]Paresis [ ]Other   SKIN:   [ ]Normal    [ ]Rash  [ ]Pressure ulcer(s)       Present on admission [ ]y [ ]n    CRITICAL CARE:  [ ] Shock Present  [ ]Septic [ ]Cardiogenic [ ]Neurologic [ ]Hypovolemic  [ ]  Vasopressors [ ]  Inotropes   [ ]Respiratory failure present [ ]Mechanical ventilation [ ]Non-invasive ventilatory support [ ]High flow    [ ]Acute  [ ]Chronic [ ]Hypoxic  [ ]Hypercarbic [ ]Other  [ ]Other organ failure     LABS:                        11.2   14.80 )-----------( 145      ( 14 May 2021 06:14 )             32.9   05-14    138  |  102  |  22  ----------------------------<  109<H>  3.4<L>   |  30  |  0.95    Ca    8.1<L>      14 May 2021 06:14  Phos  2.9     05-14  Mg     1.5     05-14    TPro  5.4<L>  /  Alb  2.0<L>  /  TBili  1.1  /  DBili  x   /  AST  40  /  ALT  80<H>  /  AlkPhos  490<H>  05-14        RADIOLOGY & ADDITIONAL STUDIES:    PROTEIN CALORIE MALNUTRITION PRESENT: [ ]mild [ ]moderate [ ]severe [ ]underweight [ ]morbid obesity  https://www.andeal.org/vault/1280/web/files/ONC/Table_Clinical%20Characteristics%20to%20Document%20Malnutrition-White%20JV%20et%20al%202012.pdf    Height (cm): 176 (05-06-21 @ 21:50)  Weight (kg): 76 (05-06-21 @ 21:50)  BMI (kg/m2): 24.5 (05-06-21 @ 21:50)    [ ]PPSV2 < or = to 30% [ ]significant weight loss  [ ]poor nutritional intake  [ ]anasarca     Albumin, Serum: 2.0 g/dL (05-14-21 @ 06:14)   [ ]Artificial Nutrition      REFERRALS:   [ ]Chaplaincy  [ ]Hospice  [ ]Child Life  [ ]Social Work  [ ]Case management [ ]Holistic Therapy     Goals of Care Document:

## 2021-05-14 NOTE — CONSULT NOTE ADULT - REASON FOR ADMISSION
hematuria, nausea, weakness

## 2021-05-14 NOTE — PROGRESS NOTE ADULT - ASSESSMENT
The patient is an 81 year old male with a history of HTN, HL, BPH who was admitted with urosepsis, strep bacteremia, course c/b septic shock and respiratory failure.    Plan:  - Blood cultures positive for strep anginosus - unlikely cause of endocarditis  - Echo with normal LV systolic function, aortic valve sclerosis, right sided dilatation with reduced function  - No obvious endocarditis on TTE but limited by calcified aortic valve  - Last cultures NGTD  - CT chest with pulm edema and small pleural effusions  - Can dose furosemide prn  - BNP improving  - On oral antibiotics  - HIDA negative

## 2021-05-14 NOTE — PROGRESS NOTE ADULT - PROVIDER SPECIALTY LIST ADULT
Cardiology
Heme/Onc
Hospitalist
Infectious Disease
Pulmonology
Pulmonology
Cardiology
Cardiology
Critical Care
Gastroenterology
Heme/Onc
Infectious Disease
Nephrology
Pulmonology
Pulmonology
Critical Care
Gastroenterology
Heme/Onc
Hospitalist
Infectious Disease
Nephrology
Pulmonology
Cardiology
Hospitalist
Hospitalist
Infectious Disease
Infectious Disease
Nephrology
Hospitalist
Pulmonology
Pulmonology
Hospitalist
MICU
Hospitalist

## 2021-05-14 NOTE — PROGRESS NOTE ADULT - SUBJECTIVE AND OBJECTIVE BOX
Date/Time Patient Seen:  		  Referring MD:   Data Reviewed	       Patient is a 81y old  Male who presents with a chief complaint of hematuria, nausea, weakness (13 May 2021 17:59)      Subjective/HPI     PAST MEDICAL & SURGICAL HISTORY:  Hypertension    Hyperlipidemia    BPH (benign prostatic hyperplasia)    History of prostate surgery          Medication list         MEDICATIONS  (STANDING):  ALBUTerol    0.083% 2.5 milliGRAM(s) Nebulizer every 8 hours  cefpodoxime 200 milliGRAM(s) Oral every 12 hours  chlorhexidine 0.12% Liquid 15 milliLiter(s) Oral Mucosa two times a day  chlorhexidine 2% Cloths 1 Application(s) Topical daily  dorzolamide 2%/timolol 0.5% Ophthalmic Solution 1 Drop(s) Both EYES two times a day  ferrous    sulfate 325 milliGRAM(s) Oral daily  FIRST- Mouthwash  BLM 5 milliLiter(s) Swish and Spit three times a day  heparin   Injectable 5000 Unit(s) SubCutaneous every 12 hours  latanoprost 0.005% Ophthalmic Solution 1 Drop(s) Both EYES at bedtime  pantoprazole  Injectable 40 milliGRAM(s) IV Push daily  potassium phosphate / sodium phosphate Powder (PHOS-NaK) 1 Packet(s) Oral three times a day    MEDICATIONS  (PRN):  acetaminophen   Tablet .. 650 milliGRAM(s) Oral every 6 hours PRN Temp greater or equal to 38C (100.4F), Mild Pain (1 - 3)         Vitals log        ICU Vital Signs Last 24 Hrs  T(C): 36.9 (14 May 2021 04:00), Max: 37.2 (13 May 2021 21:22)  T(F): 98.4 (14 May 2021 04:00), Max: 99 (13 May 2021 21:22)  HR: 80 (14 May 2021 06:00) (62 - 80)  BP: 145/76 (14 May 2021 06:00) (120/68 - 150/77)  BP(mean): 96 (14 May 2021 06:00) (82 - 100)  ABP: --  ABP(mean): --  RR: 17 (14 May 2021 06:00) (12 - 18)  SpO2: 93% (14 May 2021 06:00) (93% - 100%)           Input and Output:  I&O's Detail    12 May 2021 07:01  -  13 May 2021 07:00  --------------------------------------------------------  IN:    IV PiggyBack: 300 mL    Oral Fluid: 420 mL  Total IN: 720 mL    OUT:    Indwelling Catheter - Urethral (mL): 2500 mL  Total OUT: 2500 mL    Total NET: -1780 mL      13 May 2021 07:01  -  14 May 2021 06:53  --------------------------------------------------------  IN:    IV PiggyBack: 500 mL    Oral Fluid: 320 mL  Total IN: 820 mL    OUT:    Indwelling Catheter - Urethral (mL): 2300 mL  Total OUT: 2300 mL    Total NET: -1480 mL          Lab Data                        11.2   14.80 )-----------( 145      ( 14 May 2021 06:14 )             32.9     05-14    138  |  102  |  22  ----------------------------<  109<H>  3.4<L>   |  30  |  0.95    Ca    8.1<L>      14 May 2021 06:14  Phos  2.9     05-14  Mg     1.5     05-14    TPro  5.4<L>  /  Alb  2.0<L>  /  TBili  1.1  /  DBili  x   /  AST  40  /  ALT  80<H>  /  AlkPhos  490<H>  05-14            Review of Systems	      Objective     Physical Examination    heart s1s2  lung dec BS  abd soft  head nc  frail  weak      Pertinent Lab findings & Imaging      Christopher:  NO   Adequate UO     I&O's Detail    12 May 2021 07:01  -  13 May 2021 07:00  --------------------------------------------------------  IN:    IV PiggyBack: 300 mL    Oral Fluid: 420 mL  Total IN: 720 mL    OUT:    Indwelling Catheter - Urethral (mL): 2500 mL  Total OUT: 2500 mL    Total NET: -1780 mL      13 May 2021 07:01  -  14 May 2021 06:53  --------------------------------------------------------  IN:    IV PiggyBack: 500 mL    Oral Fluid: 320 mL  Total IN: 820 mL    OUT:    Indwelling Catheter - Urethral (mL): 2300 mL  Total OUT: 2300 mL    Total NET: -1480 mL               Discussed with:     Cultures:	        Radiology

## 2021-05-14 NOTE — CHART NOTE - NSCHARTNOTEFT_GEN_A_CORE
Critical Care PA - Love     Easily removed RIJ TLC intact.    Patient in supine position.  Manual pressure held.    No Bleeding noted.  Sterile dressing Applied.    Dressing to remain C/D/I x 48 hours.    Patient to remain supine 20 min.

## 2021-05-14 NOTE — PROGRESS NOTE ADULT - SUBJECTIVE AND OBJECTIVE BOX
Patient is a 81y old  Male who presents with a chief complaint of hematuria, nausea, weakness (10 May 2021 14:06)  Patient seen in follow up for HORACIO.        PAST MEDICAL HISTORY:  Hypertension    Hyperlipidemia    BPH (benign prostatic hyperplasia)        MEDICATIONS  (STANDING):  ALBUTerol    0.083% 2.5 milliGRAM(s) Nebulizer every 8 hours  cefpodoxime 200 milliGRAM(s) Oral every 12 hours  dorzolamide 2%/timolol 0.5% Ophthalmic Solution 1 Drop(s) Both EYES two times a day  ferrous    sulfate 325 milliGRAM(s) Oral daily  FIRST- Mouthwash  BLM 5 milliLiter(s) Swish and Spit three times a day  heparin   Injectable 5000 Unit(s) SubCutaneous every 12 hours  latanoprost 0.005% Ophthalmic Solution 1 Drop(s) Both EYES at bedtime  pantoprazole  Injectable 40 milliGRAM(s) IV Push daily  potassium phosphate / sodium phosphate Powder (PHOS-NaK) 1 Packet(s) Oral three times a day    MEDICATIONS  (PRN):  acetaminophen   Tablet .. 650 milliGRAM(s) Oral every 6 hours PRN Temp greater or equal to 38C (100.4F), Mild Pain (1 - 3)    T(C): 37.2 (05-14-21 @ 11:57), Max: 37.2 (05-12-21 @ 15:43)  HR: 76 (05-14-21 @ 14:00) (62 - 83)  BP: 120/70 (05-14-21 @ 14:00) (119/70 - 150/77)  RR: 14 (05-14-21 @ 14:00)  SpO2: 95% (05-14-21 @ 14:00)  Wt(kg): --  I&O's Detail    13 May 2021 07:01  -  14 May 2021 07:00  --------------------------------------------------------  IN:    IV PiggyBack: 500 mL    Oral Fluid: 320 mL  Total IN: 820 mL    OUT:    Indwelling Catheter - Urethral (mL): 2300 mL  Total OUT: 2300 mL    Total NET: -1480 mL      14 May 2021 07:01  -  14 May 2021 14:13  --------------------------------------------------------  IN:    IV PiggyBack: 50 mL    Oral Fluid: 280 mL  Total IN: 330 mL    OUT:    Indwelling Catheter - Urethral (mL): 1300 mL  Total OUT: 1300 mL    Total NET: -970 mL                  PHYSICAL EXAM:  General: No distress  Respiratory: b/l air entry  Cardiovascular: S1 S2  Gastrointestinal: soft  Extremities:  no edema        LABORATORY:                        11.2   14.80 )-----------( 145      ( 14 May 2021 06:14 )             32.9     05-14    138  |  102  |  22  ----------------------------<  109<H>  3.4<L>   |  30  |  0.95    Ca    8.1<L>      14 May 2021 06:14  Phos  2.9     05-14  Mg     1.5     05-14    TPro  5.4<L>  /  Alb  2.0<L>  /  TBili  1.1  /  DBili  x   /  AST  40  /  ALT  80<H>  /  AlkPhos  490<H>  05-14    Sodium, Serum: 138 mmol/L (05-14 @ 06:14)  Sodium, Serum: 140 mmol/L (05-13 @ 06:53)    Potassium, Serum: 3.4 mmol/L (05-14 @ 06:14)  Potassium, Serum: 3.0 mmol/L (05-13 @ 06:53)    Hemoglobin: 11.2 g/dL (05-14 @ 06:14)  Hemoglobin: 11.2 g/dL (05-13 @ 06:53)  Hemoglobin: 11.4 g/dL (05-12 @ 06:10)    Creatinine, Serum 0.95 (05-14 @ 06:14)  Creatinine, Serum 1.17 (05-13 @ 06:53)  Creatinine, Serum 1.31 (05-12 @ 06:10)        LIVER FUNCTIONS - ( 14 May 2021 06:14 )  Alb: 2.0 g/dL / Pro: 5.4 g/dL / ALK PHOS: 490 U/L / ALT: 80 U/L DA / AST: 40 U/L / GGT: x

## 2021-05-14 NOTE — DISCHARGE NOTE PROVIDER - NSDCFUADDINST_GEN_ALL_CORE_FT
F/U PMD in 7-10 days.  F/U urology in 7 days. F/U PMD in 7-10 days.  F/U urology in 7 days.  check cbc, cmp, Mg in 7-10 days.

## 2021-05-14 NOTE — DISCHARGE NOTE PROVIDER - CARE PROVIDERS DIRECT ADDRESSES
[email or web address]

## 2021-05-14 NOTE — PROGRESS NOTE ADULT - ASSESSMENT
on PO ABX now -   TLC removed    81M with HTN, BPH who presents with hematuria and weakness.  extubated to CPAP  s/p Sepsis - Shock - on ABX - ID following - MAP > 60, Cx and biomarkers reviewed,  CKD - HORACIO - s/p IVF - Pressors - Renal Eval  keep MAP > 60, s/p Midodrine 10 mg PO TID   Serial labs - replete lytes - monitor biomarkers  ID eval noted - pt is on broad ABX  - cx reviewed - transitioned to PO ABX  I jeremy - mucus impaction - mucus plugging on imaging - Albuterol PRN - Chest PT -   BPH rx regimen  US abd - CT renal stone hunt - CXR - noted  HIDA scan neg  SPCU monitoring - care and supportive measures

## 2021-05-14 NOTE — PROGRESS NOTE ADULT - REASON FOR ADMISSION
hematuria, nausea, weakness

## 2021-05-14 NOTE — PROGRESS NOTE ADULT - SUBJECTIVE AND OBJECTIVE BOX
Chief Complaint:  Patient is a 81y old  Male who presents with a chief complaint of hematuria, nausea, weakness (13 May 2021 07:11)      Interval Events:   no complaints    Hospital Medications:  MEDICATIONS  (STANDING):  ALBUTerol    0.083% 2.5 milliGRAM(s) Nebulizer every 8 hours  cefpodoxime 200 milliGRAM(s) Oral every 12 hours  dorzolamide 2%/timolol 0.5% Ophthalmic Solution 1 Drop(s) Both EYES two times a day  ferrous    sulfate 325 milliGRAM(s) Oral daily  FIRST- Mouthwash  BLM 5 milliLiter(s) Swish and Spit three times a day  heparin   Injectable 5000 Unit(s) SubCutaneous every 12 hours  latanoprost 0.005% Ophthalmic Solution 1 Drop(s) Both EYES at bedtime  pantoprazole  Injectable 40 milliGRAM(s) IV Push daily  potassium phosphate / sodium phosphate Powder (PHOS-NaK) 1 Packet(s) Oral three times a day    MEDICATIONS  (PRN):  acetaminophen   Tablet .. 650 milliGRAM(s) Oral every 6 hours PRN Temp greater or equal to 38C (100.4F), Mild Pain (1 - 3)          PHYSICAL EXAM:   Vital Signs Last 24 Hrs  T(C): 36.9 (14 May 2021 07:12), Max: 37.2 (13 May 2021 21:22)  T(F): 98.4 (14 May 2021 07:12), Max: 99 (13 May 2021 21:22)  HR: 72 (14 May 2021 08:49) (62 - 80)  BP: 136/76 (14 May 2021 08:00) (120/68 - 150/77)  BP(mean): 94 (14 May 2021 08:00) (82 - 100)  RR: 15 (14 May 2021 08:00) (12 - 18)  SpO2: 96% (14 May 2021 08:49) (93% - 100%)    GENERAL:  Appears stated age,  no distress  HEENT:   sclera -anicteric  CHEST:   no increased effort, breath sounds clear  HEART:  Regular rhythm, S1, S2,   ABDOMEN:  Soft, non-tender, non-distended, normoactive bowel sounds,    EXTREMITIES:  no cyanosis, clubbing or edema  SKIN:  No rash/no jaundice   NEURO:  Alert, oriented    LABS:           LABS:                        11.2   14.80 )-----------( 145      ( 14 May 2021 06:14 )             32.9     05-14    138  |  102  |  22  ----------------------------<  109<H>  3.4<L>   |  30  |  0.95    Ca    8.1<L>      14 May 2021 06:14  Phos  2.9     05-14  Mg     1.5     05-14    TPro  5.4<L>  /  Alb  2.0<L>  /  TBili  1.1  /  DBili  x   /  AST  40  /  ALT  80<H>  /  AlkPhos  490<H>  05-14    LIVER FUNCTIONS - ( 14 May 2021 06:14 )  Alb: 2.0 g/dL / Pro: 5.4 g/dL / ALK PHOS: 490 U/L / ALT: 80 U/L DA / AST: 40 U/L / GGT: x                                      x      16.55 )-----------( x        ( 13 May 2021 06:53 )             x          05-13    140  |  105  |  29<H>  ----------------------------<  99  3.0<L>   |  30  |  1.17    Ca    7.8<L>      13 May 2021 06:53  Phos  2.0     05-12  Mg     2.0     05-12    TPro  5.0<L>  /  Alb  1.8<L>  /  TBili  1.2  /  DBili  x   /  AST  40  /  ALT  81<H>  /  AlkPhos  488<H>  05-13    LIVER FUNCTIONS - ( 13 May 2021 06:53 )  Alb: 1.8 g/dL / Pro: 5.0 g/dL / ALK PHOS: 488 U/L / ALT: 81 U/L DA / AST: 40 U/L / GGT: x                                       x      16.55 )-----------( x        ( 13 May 2021 06:53 )             x                            11.4   22.05 )-----------( 47       ( 12 May 2021 06:10 )             34.1                         10.7   29.80 )-----------( 44       ( 11 May 2021 07:06 )             33.0     Imaging:

## 2021-05-14 NOTE — PROGRESS NOTE ADULT - ASSESSMENT
HORACIO: ATN  s/p Resp failure  Sepsis, s/p Shock Strep bacteremia3  Hypophosphatemia    Stable renal indices. Mg and K supps. To continue current meds. Avoid nephrotoxic meds as possible.   Will follow electrolytes and renal function trend.

## 2021-05-14 NOTE — DISCHARGE NOTE PROVIDER - CARE PROVIDER_API CALL
Rafael Cash)  Urology  [address], [address]  [place] [address]  Phone: [phone]  Fax: [phone]  Follow Up Time:     Jaylon Reyes)  Internal Medicine; Nephrology  [address], [address]  [place] [address]  Phone: [phone]  Fax: [phone]  Follow Up Time:

## 2021-05-14 NOTE — CONSULT NOTE ADULT - PROVIDER SPECIALTY LIST ADULT
Cardiology
Gastroenterology
Heme/Onc
Pulmonology
Infectious Disease
Palliative Care
Urology
Nephrology

## 2021-05-14 NOTE — DISCHARGE NOTE PROVIDER - NSDCCPCAREPLAN_GEN_ALL_CORE_FT
PRINCIPAL DISCHARGE DIAGNOSIS  Diagnosis: Sepsis, due to unspecified organism, unspecified whether acute organ dysfunction present  Assessment and Plan of Treatment: Septic Shock / Strep Bacteremia   s/p IV Zosyn   repeat blood culture 5/8 NGTD.  Extubated 5/10/21; Off Pressors and Midodrine being tapered   Normal hepatobiliary scan. No radionuclide evidence of acute cholecystitis.  CC: Dr. Zayas; ID: Dr. Hernandez; Cards: Dr. Esteban  Hypoxic Respiratory Failure   Due to Sepsis and Volume Overload; BNP Elevated   Echo reviewed with no signs of failure;   Imaging does not show any obvious pulm infection  Extubated 5/10/21  Hypokalemia/hypophosphatemia/hypomagnesemia  replete and Trend levels.  Acute Renal Failure   resolved.  Metabolic Encephalopathy   Improved. Mental status back to his baseline.  Due to Sepsis     Thrombocytopenia  improving.  HTN  Hold anti-HTN meds  BPH  Hold Flomax  Mouth ulcers likely from   magic mouth wash for pain.         PRINCIPAL DISCHARGE DIAGNOSIS  Diagnosis: Sepsis, due to unspecified organism, unspecified whether acute organ dysfunction present  Assessment and Plan of Treatment: Septic Shock / Strep Bacteremia   s/p IV Zosyn   repeat blood culture 5/8 NGTD.  Extubated 5/10/21; Off Pressors and Midodrine being tapered   Normal hepatobiliary scan. No radionuclide evidence of acute cholecystitis.  CC: Dr. Zayas; ID: Dr. Hernandez; Cards: Dr. Esteban  Hypoxic Respiratory Failure   Due to Sepsis and Volume Overload; BNP Elevated   Echo reviewed with no signs of failure;   Imaging does not show any obvious pulm infection  Extubated 5/10/21  Hypokalemia/hypophosphatemia/hypomagnesemia  replete and Trend levels.  Acute Renal Failure   resolved.  Metabolic Encephalopathy   Improved. Mental status back to his baseline.  Due to Sepsis     Thrombocytopenia  improving.  HTN  Hold anti-HTN meds  BPH  flomax  arrington cath for urethral stricture.  f/u urology as an out patient.  Mouth ulcers likely from   magic mouth wash for pain.

## 2021-05-14 NOTE — DISCHARGE NOTE PROVIDER - HOSPITAL COURSE
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     Septic Shock / Strep Bacteremia - resolved.  s/p IV Zosyn   repeat blood culture 5/8 NGTD.  Extubated 5/10/21; Off Pressors and Midodrine being tapered   Normal hepatobiliary scan. No radionuclide evidence of acute cholecystitis.    CC: Dr. Zayas; ID: Dr. Hernandez; Cards: Dr. Esteban    Hypoxic Respiratory Failure   Due to Sepsis and Volume Overload; BNP Elevated   Echo reviewed with no signs of failure;   Imaging does not show any obvious pulm infection  Extubated 5/10/21    Hypokalemia/hypophosphatemia/hypomagnesemia  replete and Trend levels.      Acute Renal Failure   resolved.    Metabolic Encephalopathy   Improved. Mental status back to his baseline.  Due to Sepsis       Thrombocytopenia  improving.    HTN  Hold anti-HTN meds    BPH  Hold Flomax    Mouth ulcers likely from intubation.  magic mouth wash for pain.    Diet  soft      T(C): 37.2 (05-14-21 @ 11:57), Max: 37.2 (05-13-21 @ 21:22)  HR: 76 (05-14-21 @ 12:00) (62 - 83)  BP: 126/71 (05-14-21 @ 12:00) (119/70 - 145/76)  RR: 20 (05-14-21 @ 12:00) (12 - 25)  SpO2: 95% (05-14-21 @ 12:00) (93% - 100%)  Wt(kg): --  REVIEW OF SYSTEMS:    CONSTITUTIONAL: No weakness, fevers or chills  EYES/ENT: No visual changes;  No vertigo or throat pain   NECK: No pain or stiffness  RESPIRATORY: No cough, wheezing, hemoptysis; No shortness of breath  CARDIOVASCULAR: No chest pain or palpitations  GASTROINTESTINAL: No abdominal or epigastric pain. No nausea, vomiting, or hematemesis; No diarrhea or constipation. No melena or hematochezia.  GENITOURINARY: No dysuria, frequency or hematuria  NEUROLOGICAL: No numbness or weakness  SKIN: No itching, burning, rashes, or lesions   All other review of systems is negative unless indicated above.  PHYSICAL EXAM:  GENERAL: NAD, well-groomed, well-developed  HEAD:  Atraumatic, Normocephalic  EYES: EOMI, PERRLA, conjunctiva and sclera clear  ENMT: No tonsillar erythema, exudates, or enlargement; Moist mucous membranes, Good dentition, No lesions  NECK: Supple, No JVD, Normal thyroid  NERVOUS SYSTEM:  Alert & Oriented X3, Good concentration; Motor Strength 5/5 B/L upper and lower extremities; DTRs 2+ intact and symmetric  CHEST/LUNG: Clear to auscultation bilaterally; No rales, rhonchi, wheezing, or rubs  HEART: Regular rate and rhythm; No murmurs, rubs, or gallops  ABDOMEN: Soft, Nontender, Nondistended; Bowel sounds present  EXTREMITIES:  2+ Peripheral Pulses, No clubbing or cyanosis    Time spent-60 minutes.  PMD notified-       81M HTN, BPH and Chronic Bacteriuria who had cystoscopy last week admitted for Septic Shock from  infection.     Septic Shock / Strep Bacteremia - resolved.  s/p IV Zosyn   repeat blood culture 5/8 NGTD.  Extubated 5/10/21; Off Pressors and Midodrine being tapered   Normal hepatobiliary scan. No radionuclide evidence of acute cholecystitis.    CC: Dr. Zayas; ID: Dr. Hernandez; Cards: Dr. Esteban    Hypoxic Respiratory Failure   Due to Sepsis and Volume Overload; BNP Elevated   Echo reviewed with no signs of failure;   Imaging does not show any obvious pulm infection  Extubated 5/10/21    Hypokalemia/hypophosphatemia/hypomagnesemia  replete and Trend levels.      Acute Renal Failure   resolved.    Metabolic Encephalopathy   Improved. Mental status back to his baseline.  Due to Sepsis       Thrombocytopenia  improving.    HTN  Hold anti-HTN meds    BPH  flomax  arrington cath for urethral stricture.  f/u urology as an out patient.    Mouth ulcers likely from intubation.  magic mouth wash for pain.    Diet  soft      T(C): 37.2 (05-14-21 @ 11:57), Max: 37.2 (05-13-21 @ 21:22)  HR: 76 (05-14-21 @ 12:00) (62 - 83)  BP: 126/71 (05-14-21 @ 12:00) (119/70 - 145/76)  RR: 20 (05-14-21 @ 12:00) (12 - 25)  SpO2: 95% (05-14-21 @ 12:00) (93% - 100%)  Wt(kg): --  REVIEW OF SYSTEMS:    CONSTITUTIONAL: No weakness, fevers or chills  EYES/ENT: No visual changes;  No vertigo or throat pain   NECK: No pain or stiffness  RESPIRATORY: No cough, wheezing, hemoptysis; No shortness of breath  CARDIOVASCULAR: No chest pain or palpitations  GASTROINTESTINAL: No abdominal or epigastric pain. No nausea, vomiting, or hematemesis; No diarrhea or constipation. No melena or hematochezia.  GENITOURINARY: No dysuria, frequency or hematuria  NEUROLOGICAL: No numbness or weakness  SKIN: No itching, burning, rashes, or lesions   All other review of systems is negative unless indicated above.  PHYSICAL EXAM:  GENERAL: NAD, well-groomed, well-developed  HEAD:  Atraumatic, Normocephalic  EYES: EOMI, PERRLA, conjunctiva and sclera clear  ENMT: No tonsillar erythema, exudates, or enlargement; Moist mucous membranes, Good dentition, No lesions  NECK: Supple, No JVD, Normal thyroid  NERVOUS SYSTEM:  Alert & Oriented X3, Good concentration; Motor Strength 5/5 B/L upper and lower extremities; DTRs 2+ intact and symmetric  CHEST/LUNG: Clear to auscultation bilaterally; No rales, rhonchi, wheezing, or rubs  HEART: Regular rate and rhythm; No murmurs, rubs, or gallops  ABDOMEN: Soft, Nontender, Nondistended; Bowel sounds present  EXTREMITIES:  2+ Peripheral Pulses, No clubbing or cyanosis    Time spent-60 minutes.  PMD notified-  is retired, will f/u with .

## 2021-05-14 NOTE — PROGRESS NOTE ADULT - SUBJECTIVE AND OBJECTIVE BOX
[facility], Division of Infectious Diseases  YASMANY Gaona Y. Patel, S. Shah  [phone]    Name: ANN MARIE NAVARRETE  Age: 81y  Gender: Male  MRN: [id number]  Note Date: 05-14-21    Interval History:  Patient seen and examined at bedside this afternoon  No acute overnight events. Afebrile  Was being prepped for d/c  Notes reviewed    Antibiotics:  cefpodoxime 200 milliGRAM(s) Oral every 12 hours      Medications:  acetaminophen   Tablet .. 650 milliGRAM(s) Oral every 6 hours PRN  ALBUTerol    0.083% 2.5 milliGRAM(s) Nebulizer every 8 hours  cefpodoxime 200 milliGRAM(s) Oral every 12 hours  dorzolamide 2%/timolol 0.5% Ophthalmic Solution 1 Drop(s) Both EYES two times a day  ferrous    sulfate 325 milliGRAM(s) Oral daily  FIRST- Mouthwash  BLM 5 milliLiter(s) Swish and Spit three times a day  heparin   Injectable 5000 Unit(s) SubCutaneous every 12 hours  latanoprost 0.005% Ophthalmic Solution 1 Drop(s) Both EYES at bedtime  pantoprazole  Injectable 40 milliGRAM(s) IV Push daily  potassium phosphate / sodium phosphate Powder (PHOS-NaK) 1 Packet(s) Oral three times a day      Review of Systems:  A 10-point review of systems was obtained.   Review of systems otherwise negative except as previously noted.    Allergies: No Known Allergies    For details regarding the patient's past medical history, social history, family history, and other miscellaneous elements, please refer the initial infectious diseases consultation and/or the admitting history and physical examination for this admission.    Objective:  Vitals:   T(C): 37.2 (05-14-21 @ 11:57), Max: 37.2 (05-13-21 @ 21:22)  HR: 76 (05-14-21 @ 14:00) (62 - 83)  BP: 120/70 (05-14-21 @ 14:00) (119/70 - 145/76)  RR: 14 (05-14-21 @ 14:00) (12 - 25)  SpO2: 95% (05-14-21 @ 14:00) (93% - 100%)    Physical Examination:  General: no acute distress  HEENT: NC/AT, EOMI, anicteric, no oral lesions  Neck: supple, no palpable LAD  Cardio: S1, S2 heard, RRR, no murmurs  Resp: breath sounds heard bilaterally, no rales, wheezes or rhonchi  Abd: soft, NT, ND, + bowel sounds  Ext: no edema or cyanosis  Skin: warm, dry, no visible rash      Laboratory Studies:  CBC:                       11.2   14.80 )-----------( 145      ( 14 May 2021 06:14 )             32.9     CMP: 05-14    138  |  102  |  22  ----------------------------<  109<H>  3.4<L>   |  30  |  0.95    Ca    8.1<L>      14 May 2021 06:14  Phos  2.9     05-14  Mg     1.5     05-14    TPro  5.4<L>  /  Alb  2.0<L>  /  TBili  1.1  /  DBili  x   /  AST  40  /  ALT  80<H>  /  AlkPhos  490<H>  05-14    LIVER FUNCTIONS - ( 14 May 2021 06:14 )  Alb: 2.0 g/dL / Pro: 5.4 g/dL / ALK PHOS: 490 U/L / ALT: 80 U/L DA / AST: 40 U/L / GGT: x             Microbiology: reviewed    Culture - Sputum (collected 05-09-21 @ 20:53)  Source: .Sputum Sputum  Gram Stain (05-09-21 @ 22:52):    Rare polymorphonuclear leukocytes per low power field    Few Squamous epithelial cells per low power field    No organisms seen per oil power field  Final Report (05-11-21 @ 19:00):    Normal Respiratory Mckinley present    Culture - Blood (collected 05-08-21 @ 22:09)  Source: .Blood Blood-Venous  Final Report (05-13-21 @ 23:01):    No Growth Final    Culture - Blood (collected 05-08-21 @ 22:09)  Source: .Blood Blood-Peripheral  Final Report (05-13-21 @ 23:01):    No Growth Final    Culture - Urine (collected 05-08-21 @ 22:00)  Source: .Urine Catheterized  Final Report (05-09-21 @ 17:11):    No growth    Culture - Urine (collected 05-07-21 @ 13:22)  Source: .Urine Clean Catch (Midstream)  Final Report (05-08-21 @ 23:17):    Normal Urogenital mckinley present    Culture - Blood (collected 05-07-21 @ 13:22)  Source: .Blood Blood-Peripheral  Gram Stain (05-09-21 @ 09:40):    Growth in aerobic bottle: Gram Positive Cocci in Pairs and Chains    Growth in anaerobic bottle: Gram positive cocci in pairs  Final Report (05-10-21 @ 16:17):    Growth in aerobic bottle: Streptococcus anginosus    Growth in anaerobic bottle: Peptoniphilus harei group "Susceptibilities    not performed"    ***Blood Panel PCR results on this specimen are available    approximately 3 hours after the Gram stain result.***    Gram stain, PCR, and/or culture results may not always    correspond due to difference in methodologies.    ************************************************************    This PCR assay was performed by multiplex PCR. This    Assay tests for 66 bacterial and resistance gene targets.    Please refer to the [facility] test directory    at [email or web address] for details.  Organism: Blood Culture PCR  Streptococcus anginosus (05-10-21 @ 16:17)  Organism: Streptococcus anginosus (05-10-21 @ 16:17)      -  Ceftriaxone: S 0.125      -  Penicillin: S 0.047      Method Type: ETEST  Organism: Streptococcus anginosus (05-10-21 @ 16:17)      -  Clindamycin: S      -  Erythromycin: S      -  Levofloxacin: S      -  Vancomycin: S      Method Type: KB  Organism: Blood Culture PCR (05-10-21 @ 16:17)      -  Streptococcus anginosus group: Detec      Method Type: PCR    Culture - Blood (collected 05-07-21 @ 13:22)  Source: .Blood Blood-Peripheral  Gram Stain (05-08-21 @ 22:10):    Growth in anaerobic bottle: Gram Positive Cocci in Pairs and Chains  Final Report (05-09-21 @ 19:22):    Growth in anaerobic bottle: Streptococcus anginosus    See previous culture [id number]        Radiology: reviewed

## 2021-05-14 NOTE — GOALS OF CARE CONVERSATION - ADVANCED CARE PLANNING - CONVERSATION DETAILS
Writer met with pt and family. Reviewed patient's medical and social history as well as events leading to patient's hospitalization. Writer discussed patient's current diagnosis (Sepsis,BPH,HLD,HTN UTI,HORACIO),  medical condition and management,  prognosis, and life expectancy. Inquired about patient's wishes regarding extent of medical care to be provided including escalation of medical care into the ICU and use of vasopressor support. In addition, the writer inquired about thoughts regarding cardiopulmnary resuscitation, artificial nutrition and hydration including use of feeding tubes and IVF, antibiotics, and further investigative studies such as blood draws and radiology. Family and pt. showed .some  insight into medical condition. All questions answered. Writer recommended they complete HCP ,pt being discharged as we spoke. Psychosocial support provided.

## 2021-05-14 NOTE — PROGRESS NOTE ADULT - NSICDXPILOT_GEN_ALL_CORE
[place]

## 2021-05-14 NOTE — PROGRESS NOTE ADULT - ASSESSMENT
81M with HTN, BPH recent cystoscopy about 2 weeks ago (for evaluation for chronic bacteriuria) admitted with acute cystitis with hematuria complicated by Strep septicemia, severe sepsis with septic shock and acute respiratory failure.   Intubated and on pressors 5/8/21 Extubated 5/10/21, off pressors.  CHF   Echo noted  HORACIO with AUR- arrington placed by urology 5/8/21  WBC rising and with abn LFTs- previous u/s and CT AP nonspecific findings- Sludge and diffuse nonspecific wall edema- seen by GI  HIDA neg  WBC downtrending  Making progress    Plan :   C/w Vantin x 7 days  Trend temps and cbc  Arrington per urology  Asp precautions    Continue with present regiment.  Appropriate use of antibiotics and adverse effects reviewed.    D/c planning per primary team    Covering fro Dr. David Madera M.D.  [facility], Division of Infectious Diseases [phone]  For over the weekend and after hours, please call [phone]

## 2021-05-21 PROBLEM — N40.0 BENIGN PROSTATIC HYPERPLASIA WITHOUT LOWER URINARY TRACT SYMPTOMS: Chronic | Status: ACTIVE | Noted: 2021-05-07

## 2021-05-21 PROBLEM — I10 ESSENTIAL (PRIMARY) HYPERTENSION: Chronic | Status: ACTIVE | Noted: 2021-05-07

## 2021-05-21 PROBLEM — E78.5 HYPERLIPIDEMIA, UNSPECIFIED: Chronic | Status: ACTIVE | Noted: 2021-05-07

## 2021-06-25 ENCOUNTER — APPOINTMENT (OUTPATIENT)
Dept: INTERNAL MEDICINE | Facility: CLINIC | Age: 81
End: 2021-06-25
Payer: MEDICARE

## 2021-06-25 VITALS
SYSTOLIC BLOOD PRESSURE: 112 MMHG | BODY MASS INDEX: 22.88 KG/M2 | WEIGHT: 151 LBS | RESPIRATION RATE: 12 BRPM | HEART RATE: 63 BPM | TEMPERATURE: 97.7 F | DIASTOLIC BLOOD PRESSURE: 62 MMHG | HEIGHT: 68 IN | OXYGEN SATURATION: 97 %

## 2021-06-25 DIAGNOSIS — Z82.49 FAMILY HISTORY OF ISCHEMIC HEART DISEASE AND OTHER DISEASES OF THE CIRCULATORY SYSTEM: ICD-10-CM

## 2021-06-25 DIAGNOSIS — H40.9 UNSPECIFIED GLAUCOMA: ICD-10-CM

## 2021-06-25 DIAGNOSIS — F51.02 ADJUSTMENT INSOMNIA: ICD-10-CM

## 2021-06-25 DIAGNOSIS — R53.83 OTHER FATIGUE: ICD-10-CM

## 2021-06-25 DIAGNOSIS — Z83.3 FAMILY HISTORY OF DIABETES MELLITUS: ICD-10-CM

## 2021-06-25 DIAGNOSIS — N32.3 DIVERTICULUM OF BLADDER: ICD-10-CM

## 2021-06-25 DIAGNOSIS — D50.8 OTHER IRON DEFICIENCY ANEMIAS: ICD-10-CM

## 2021-06-25 DIAGNOSIS — M65.30 TRIGGER FINGER, UNSPECIFIED FINGER: ICD-10-CM

## 2021-06-25 PROCEDURE — 99205 OFFICE O/P NEW HI 60 MIN: CPT

## 2021-06-25 PROCEDURE — 99072 ADDL SUPL MATRL&STAF TM PHE: CPT

## 2021-06-25 NOTE — PLAN
[FreeTextEntry1] : Renewed the flomax, iron, and atorvastatin.\par Reviewed hospital discharge summary, and recent blood work.\par F/u with urology next month.\par Physical Therapy referral.\par Hand Surgeon referral for occ. trigger finger, left index.\par Follow up in 3 months.

## 2021-06-25 NOTE — HISTORY OF PRESENT ILLNESS
[Family Member] : family member [de-identified] : Pt. here to establish care. He was hospitalized in [facility] [place] in May for urosepsis. \par Since his hospitalization he has been weak, easily fatigued. He has seen a cardiologist: Dr. Reyes, and a Urologist.\par

## 2021-08-11 ENCOUNTER — OUTPATIENT (OUTPATIENT)
Dept: OUTPATIENT SERVICES | Facility: HOSPITAL | Age: 81
LOS: 1 days | End: 2021-08-11
Payer: MEDICARE

## 2021-08-11 ENCOUNTER — APPOINTMENT (OUTPATIENT)
Dept: RADIOLOGY | Facility: CLINIC | Age: 81
End: 2021-08-11
Payer: MEDICARE

## 2021-08-11 DIAGNOSIS — Z00.8 ENCOUNTER FOR OTHER GENERAL EXAMINATION: ICD-10-CM

## 2021-08-11 DIAGNOSIS — Z98.890 OTHER SPECIFIED POSTPROCEDURAL STATES: Chronic | ICD-10-CM

## 2021-08-11 PROCEDURE — 71046 X-RAY EXAM CHEST 2 VIEWS: CPT

## 2021-08-11 PROCEDURE — 71046 X-RAY EXAM CHEST 2 VIEWS: CPT | Mod: 26

## 2021-10-11 ENCOUNTER — APPOINTMENT (OUTPATIENT)
Dept: INTERNAL MEDICINE | Facility: CLINIC | Age: 81
End: 2021-10-11
Payer: MEDICARE

## 2021-10-11 VITALS
OXYGEN SATURATION: 98 % | HEART RATE: 59 BPM | BODY MASS INDEX: 23.19 KG/M2 | WEIGHT: 153 LBS | HEIGHT: 68 IN | DIASTOLIC BLOOD PRESSURE: 62 MMHG | TEMPERATURE: 97.5 F | SYSTOLIC BLOOD PRESSURE: 120 MMHG

## 2021-10-11 DIAGNOSIS — Z23 ENCOUNTER FOR IMMUNIZATION: ICD-10-CM

## 2021-10-11 DIAGNOSIS — J45.909 UNSPECIFIED ASTHMA, UNCOMPLICATED: ICD-10-CM

## 2021-10-11 PROCEDURE — 99214 OFFICE O/P EST MOD 30 MIN: CPT

## 2021-10-11 RX ORDER — DORZOLAMIDE HYDROCHLORIDE TIMOLOL MALEATE 20; 5 MG/ML; MG/ML
22.3-6.8 SOLUTION/ DROPS OPHTHALMIC
Qty: 10 | Refills: 0 | Status: ACTIVE | COMMUNITY
Start: 2021-04-12

## 2021-10-11 RX ORDER — ALBUTEROL SULFATE 2.5 MG/3ML
(2.5 MG/3ML) SOLUTION RESPIRATORY (INHALATION)
Qty: 75 | Refills: 0 | Status: DISCONTINUED | COMMUNITY
Start: 2021-05-24

## 2021-10-11 RX ORDER — CHLORHEXIDINE GLUCONATE 4 %
325 (65 FE) LIQUID (ML) TOPICAL DAILY
Qty: 90 | Refills: 3 | Status: DISCONTINUED | COMMUNITY
Start: 2021-06-25 | End: 2021-10-11

## 2021-10-11 RX ORDER — ALBUTEROL SULFATE 90 UG/1
108 (90 BASE) INHALANT RESPIRATORY (INHALATION) EVERY 4 HOURS
Qty: 1 | Refills: 5 | Status: ACTIVE | COMMUNITY
Start: 2021-05-24 | End: 1900-01-01

## 2021-10-11 RX ORDER — LATANOPROST/PF 0.005 %
0.01 DROPS OPHTHALMIC (EYE)
Qty: 8 | Refills: 0 | Status: ACTIVE | COMMUNITY
Start: 2021-08-31

## 2021-10-11 NOTE — HISTORY OF PRESENT ILLNESS
[de-identified] : Pt. here for f/u.\par Went to cardiologist since last visit. Prescribed Albuterol PRN for reactive airway.\par He has also starting taking Allopurinol for gout.

## 2021-10-11 NOTE — PLAN
[FreeTextEntry1] : f/u with urologist and cardiologist next week.\par Continue medications as prescribed.\par Renewed albuterol and allopurinol.\par

## 2021-10-25 NOTE — ED ADULT NURSE NOTE - HOW OFTEN DO YOU HAVE A DRINK CONTAINING ALCOHOL?
Care rounds completed with Dr Jarrod Oliveira and multidisciplinary team.  Reviewed labs, meds, VS (temp/HR/RR), I/O's, assessment, & plan of care for today. See progress note & new orders for details. Dr pickard pt's  Michelet Blunt who is in to visit.  Ana Mack RN Never

## 2021-10-27 ENCOUNTER — APPOINTMENT (OUTPATIENT)
Dept: UROLOGY | Facility: CLINIC | Age: 81
End: 2021-10-27

## 2022-03-07 ENCOUNTER — OUTPATIENT (OUTPATIENT)
Dept: OUTPATIENT SERVICES | Facility: HOSPITAL | Age: 82
LOS: 1 days | End: 2022-03-07
Payer: COMMERCIAL

## 2022-03-07 ENCOUNTER — APPOINTMENT (OUTPATIENT)
Dept: RADIOLOGY | Facility: CLINIC | Age: 82
End: 2022-03-07
Payer: MEDICARE

## 2022-03-07 DIAGNOSIS — Z98.890 OTHER SPECIFIED POSTPROCEDURAL STATES: Chronic | ICD-10-CM

## 2022-03-07 DIAGNOSIS — Z00.8 ENCOUNTER FOR OTHER GENERAL EXAMINATION: ICD-10-CM

## 2022-03-07 PROCEDURE — 71046 X-RAY EXAM CHEST 2 VIEWS: CPT | Mod: 26

## 2022-03-07 PROCEDURE — 71046 X-RAY EXAM CHEST 2 VIEWS: CPT

## 2022-06-02 ENCOUNTER — APPOINTMENT (OUTPATIENT)
Dept: FAMILY MEDICINE | Facility: CLINIC | Age: 82
End: 2022-06-02
Payer: MEDICARE

## 2022-06-02 ENCOUNTER — NON-APPOINTMENT (OUTPATIENT)
Age: 82
End: 2022-06-02

## 2022-06-02 VITALS
SYSTOLIC BLOOD PRESSURE: 110 MMHG | WEIGHT: 159 LBS | BODY MASS INDEX: 24.1 KG/M2 | HEIGHT: 68 IN | RESPIRATION RATE: 15 BRPM | DIASTOLIC BLOOD PRESSURE: 60 MMHG | OXYGEN SATURATION: 93 % | HEART RATE: 61 BPM | TEMPERATURE: 97.1 F

## 2022-06-02 DIAGNOSIS — Z13.29 ENCOUNTER FOR SCREENING FOR OTHER SUSPECTED ENDOCRINE DISORDER: ICD-10-CM

## 2022-06-02 DIAGNOSIS — M1A.9XX0 CHRONIC GOUT, UNSPECIFIED, W/OUT TOPHUS (TOPHI): ICD-10-CM

## 2022-06-02 PROCEDURE — 99214 OFFICE O/P EST MOD 30 MIN: CPT | Mod: 25

## 2022-06-02 RX ORDER — ASPIRIN 81 MG
81 TABLET, DELAYED RELEASE (ENTERIC COATED) ORAL
Refills: 0 | Status: ACTIVE | COMMUNITY

## 2022-06-03 PROBLEM — Z13.29 SCREENING FOR THYROID DISORDER: Status: ACTIVE | Noted: 2022-06-03

## 2022-06-03 PROBLEM — M1A.9XX0 CHRONIC GOUT: Status: ACTIVE | Noted: 2021-10-11

## 2022-06-03 NOTE — ASSESSMENT
[FreeTextEntry1] : 81 yo male PMH gout, HLD, urosepsis, visual impairment presenting today for follow-up.

## 2022-06-03 NOTE — HISTORY OF PRESENT ILLNESS
[FreeTextEntry1] : follow-up  [de-identified] : 83 yo male PMH gout, HLD, urosepsis, visual impairment presenting today for follow-up. His nephew is present with him at the appointment. He reports that he needs forms filled out for continued personal assistance services. Denies any specific complaints today.  \par He is currently following with urology (Dr. Morton) and cardiology (Dr. Shaik Reyes). He has a nuclear stress test scheduled in 2 weeks. He was also recently started on HCTZ by his cardiologist for HTN and b/l LE swelling.\par

## 2022-06-03 NOTE — ASSESSMENT
[FreeTextEntry1] : 83 yo male PMH gout, HLD, urosepsis, visual impairment presenting today for follow-up.

## 2022-06-03 NOTE — HEALTH RISK ASSESSMENT
[1] : 2) Feeling down, depressed, or hopeless for several days (1) [Several Days (1)] : 4.) Feeling tired or having little energy? Several days [Not at All (0)] : 8.) Moving or speaking so slowly that other people could have noticed, or the opposite, moving or speaking faster than usual? Not at all [Not at all] : How difficult have these problems made it for you to do your work, take care of things at home, or get along with people? Not at all [PHQ-9 Negative - No further assessment needed] : PHQ-9 Negative - No further assessment needed [I have developed a follow-up plan documented below in the note.] : I have developed a follow-up plan documented below in the note. [[id number]] : 4

## 2022-06-03 NOTE — HEALTH RISK ASSESSMENT
[1] : 2) Feeling down, depressed, or hopeless for several days (1) [Several Days (1)] : 4.) Feeling tired or having little energy? Several days [Not at All (0)] : 8.) Moving or speaking so slowly that other people could have noticed, or the opposite, moving or speaking faster than usual? Not at all [Not at all] : How difficult have these problems made it for you to do your work, take care of things at home, or get along with people? Not at all [PHQ-9 Negative - No further assessment needed] : PHQ-9 Negative - No further assessment needed [I have developed a follow-up plan documented below in the note.] : I have developed a follow-up plan documented below in the note. [[id number]] : 4

## 2022-06-03 NOTE — PHYSICAL EXAM
[Normal Outer Ear/Nose] : the outer ears and nose were normal in appearance [Normal Oropharynx] : the oropharynx was normal [Normal] : affect was normal and insight and judgment were intact [de-identified] : trace b/l LE edema

## 2022-06-03 NOTE — PHYSICAL EXAM
[Normal Outer Ear/Nose] : the outer ears and nose were normal in appearance [Normal Oropharynx] : the oropharynx was normal [Normal] : affect was normal and insight and judgment were intact [de-identified] : trace b/l LE edema

## 2022-06-03 NOTE — HISTORY OF PRESENT ILLNESS
[FreeTextEntry1] : follow-up  [de-identified] : 81 yo male PMH gout, HLD, urosepsis, visual impairment presenting today for follow-up. His nephew is present with him at the appointment. He reports that he needs forms filled out for continued personal assistance services. Denies any specific complaints today.  \par He is currently following with urology (Dr. Morton) and cardiology (Dr. Shaik Reyes). He has a nuclear stress test scheduled in 2 weeks. He was also recently started on HCTZ by his cardiologist for HTN and b/l LE swelling.\par

## 2022-06-03 NOTE — PLAN
[FreeTextEntry1] : -discussed medical history with patient's niece (Kedar) via telephone, she provides assistance to patient and is the one who usually takes him to all his medical appointments \par -Depression screen: PHQ2 positive PHQ9 negative - on further discussion w/ patient and family member, patient reports that he did feel "more sad about 1 month ago", current denies feeling depressed - discussed therapy and medication as option in the future if needed, patient declines at this time \par \par #HLD \par -Continue atorvastatin 20 mg QD \par -F/u Lipid panel \par \par #Gout \par -Stable \par -Continue allopurinol 300 mg QD \par \par -blood work script provided \par

## 2022-06-24 ENCOUNTER — APPOINTMENT (OUTPATIENT)
Dept: INTERNAL MEDICINE | Facility: CLINIC | Age: 82
End: 2022-06-24

## 2022-07-08 ENCOUNTER — OUTPATIENT (OUTPATIENT)
Dept: OUTPATIENT SERVICES | Facility: HOSPITAL | Age: 82
LOS: 1 days | End: 2022-07-08
Payer: MEDICARE

## 2022-07-08 ENCOUNTER — APPOINTMENT (OUTPATIENT)
Dept: CT IMAGING | Facility: CLINIC | Age: 82
End: 2022-07-08

## 2022-07-08 DIAGNOSIS — Z98.890 OTHER SPECIFIED POSTPROCEDURAL STATES: Chronic | ICD-10-CM

## 2022-07-08 DIAGNOSIS — Z00.8 ENCOUNTER FOR OTHER GENERAL EXAMINATION: ICD-10-CM

## 2022-07-08 PROCEDURE — 0504T: CPT

## 2022-07-08 PROCEDURE — 0503T: CPT

## 2022-07-08 PROCEDURE — 75574 CT ANGIO HRT W/3D IMAGE: CPT

## 2022-07-08 PROCEDURE — 75574 CT ANGIO HRT W/3D IMAGE: CPT | Mod: 26

## 2022-07-08 PROCEDURE — 0502T: CPT

## 2022-12-28 NOTE — DISCHARGE NOTE NURSING/CASE MANAGEMENT/SOCIAL WORK - BRAND OF COVID-19 VACCINATION
Moderna dose 1 and 2 Benzoyl Peroxide Pregnancy And Lactation Text: This medication is Pregnancy Category C. It is unknown if benzoyl peroxide is excreted in breast milk.

## 2023-01-04 ENCOUNTER — INPATIENT (INPATIENT)
Facility: HOSPITAL | Age: 83
LOS: 2 days | Discharge: HOME | DRG: 193 | End: 2023-01-07
Attending: INTERNAL MEDICINE | Admitting: INTERNAL MEDICINE
Payer: MEDICARE

## 2023-01-04 VITALS
HEIGHT: 68.9 IN | DIASTOLIC BLOOD PRESSURE: 67 MMHG | RESPIRATION RATE: 22 BRPM | WEIGHT: 157.63 LBS | HEART RATE: 57 BPM | SYSTOLIC BLOOD PRESSURE: 157 MMHG | OXYGEN SATURATION: 96 % | TEMPERATURE: 101 F

## 2023-01-04 DIAGNOSIS — Z98.890 OTHER SPECIFIED POSTPROCEDURAL STATES: Chronic | ICD-10-CM

## 2023-01-04 LAB
ALBUMIN SERPL ELPH-MCNC: 3.3 G/DL — SIGNIFICANT CHANGE UP (ref 3.3–5)
ALP SERPL-CCNC: 80 U/L — SIGNIFICANT CHANGE UP (ref 30–120)
ALT FLD-CCNC: 21 U/L DA — SIGNIFICANT CHANGE UP (ref 10–60)
ANION GAP SERPL CALC-SCNC: 12 MMOL/L — SIGNIFICANT CHANGE UP (ref 5–17)
ANISOCYTOSIS BLD QL: SLIGHT — SIGNIFICANT CHANGE UP
APPEARANCE UR: ABNORMAL
APTT BLD: 35.4 SEC — SIGNIFICANT CHANGE UP (ref 27.5–35.5)
AST SERPL-CCNC: 39 U/L — SIGNIFICANT CHANGE UP (ref 10–40)
BACTERIA # UR AUTO: ABNORMAL
BASOPHILS # BLD AUTO: 0.03 K/UL — SIGNIFICANT CHANGE UP (ref 0–0.2)
BASOPHILS NFR BLD AUTO: 1 % — SIGNIFICANT CHANGE UP (ref 0–2)
BILIRUB SERPL-MCNC: 0.5 MG/DL — SIGNIFICANT CHANGE UP (ref 0.2–1.2)
BILIRUB UR-MCNC: NEGATIVE — SIGNIFICANT CHANGE UP
BUN SERPL-MCNC: 24 MG/DL — HIGH (ref 7–23)
CALCIUM SERPL-MCNC: 8.1 MG/DL — LOW (ref 8.4–10.5)
CHLORIDE SERPL-SCNC: 99 MMOL/L — SIGNIFICANT CHANGE UP (ref 96–108)
CO2 SERPL-SCNC: 20 MMOL/L — LOW (ref 22–31)
COLOR SPEC: YELLOW — SIGNIFICANT CHANGE UP
CREAT SERPL-MCNC: 1.51 MG/DL — HIGH (ref 0.5–1.3)
DIFF PNL FLD: ABNORMAL
EGFR: 46 ML/MIN/1.73M2 — LOW
EOSINOPHIL # BLD AUTO: 0 K/UL — SIGNIFICANT CHANGE UP (ref 0–0.5)
EOSINOPHIL NFR BLD AUTO: 0 % — SIGNIFICANT CHANGE UP (ref 0–6)
EPI CELLS # UR: SIGNIFICANT CHANGE UP
FLUAV AG NPH QL: SIGNIFICANT CHANGE UP
FLUBV AG NPH QL: SIGNIFICANT CHANGE UP
GLUCOSE SERPL-MCNC: 119 MG/DL — HIGH (ref 70–99)
GLUCOSE UR QL: NEGATIVE MG/DL — SIGNIFICANT CHANGE UP
HCT VFR BLD CALC: 35.2 % — LOW (ref 39–50)
HGB BLD-MCNC: 11.9 G/DL — LOW (ref 13–17)
HYPOCHROMIA BLD QL: SLIGHT — SIGNIFICANT CHANGE UP
INR BLD: 1.25 RATIO — HIGH (ref 0.88–1.16)
KETONES UR-MCNC: ABNORMAL
LACTATE SERPL-SCNC: 0.9 MMOL/L — SIGNIFICANT CHANGE UP (ref 0.7–2)
LEUKOCYTE ESTERASE UR-ACNC: ABNORMAL
LYMPHOCYTES # BLD AUTO: 1.07 K/UL — SIGNIFICANT CHANGE UP (ref 1–3.3)
LYMPHOCYTES # BLD AUTO: 35 % — SIGNIFICANT CHANGE UP (ref 13–44)
MANUAL SMEAR VERIFICATION: SIGNIFICANT CHANGE UP
MCHC RBC-ENTMCNC: 29.6 PG — SIGNIFICANT CHANGE UP (ref 27–34)
MCHC RBC-ENTMCNC: 33.8 GM/DL — SIGNIFICANT CHANGE UP (ref 32–36)
MCV RBC AUTO: 87.6 FL — SIGNIFICANT CHANGE UP (ref 80–100)
MONOCYTES # BLD AUTO: 0.12 K/UL — SIGNIFICANT CHANGE UP (ref 0–0.9)
MONOCYTES NFR BLD AUTO: 4 % — SIGNIFICANT CHANGE UP (ref 2–14)
NEUTROPHILS # BLD AUTO: 1.84 K/UL — SIGNIFICANT CHANGE UP (ref 1.8–7.4)
NEUTROPHILS NFR BLD AUTO: 59 % — SIGNIFICANT CHANGE UP (ref 43–77)
NEUTS BAND # BLD: 1 % — SIGNIFICANT CHANGE UP (ref 0–8)
NITRITE UR-MCNC: NEGATIVE — SIGNIFICANT CHANGE UP
NRBC # BLD: 0 — SIGNIFICANT CHANGE UP
NRBC # BLD: SIGNIFICANT CHANGE UP /100 WBCS (ref 0–0)
PH UR: 5 — SIGNIFICANT CHANGE UP (ref 5–8)
PLAT MORPH BLD: NORMAL — SIGNIFICANT CHANGE UP
PLATELET # BLD AUTO: 138 K/UL — LOW (ref 150–400)
POTASSIUM SERPL-MCNC: 3.8 MMOL/L — SIGNIFICANT CHANGE UP (ref 3.5–5.3)
POTASSIUM SERPL-SCNC: 3.8 MMOL/L — SIGNIFICANT CHANGE UP (ref 3.5–5.3)
PROT SERPL-MCNC: 6.8 G/DL — SIGNIFICANT CHANGE UP (ref 6–8.3)
PROT UR-MCNC: 100 MG/DL
PROTHROM AB SERPL-ACNC: 14.8 SEC — HIGH (ref 10.5–13.4)
RBC # BLD: 4.02 M/UL — LOW (ref 4.2–5.8)
RBC # FLD: 11.9 % — SIGNIFICANT CHANGE UP (ref 10.3–14.5)
RBC BLD AUTO: SIGNIFICANT CHANGE UP
RBC CASTS # UR COMP ASSIST: ABNORMAL /HPF (ref 0–4)
RSV RNA NPH QL NAA+NON-PROBE: SIGNIFICANT CHANGE UP
SARS-COV-2 RNA SPEC QL NAA+PROBE: SIGNIFICANT CHANGE UP
SODIUM SERPL-SCNC: 131 MMOL/L — LOW (ref 135–145)
SP GR SPEC: 1.01 — SIGNIFICANT CHANGE UP (ref 1.01–1.02)
UROBILINOGEN FLD QL: NEGATIVE MG/DL — SIGNIFICANT CHANGE UP
WBC # BLD: 3.06 K/UL — LOW (ref 3.8–10.5)
WBC # FLD AUTO: 3.06 K/UL — LOW (ref 3.8–10.5)
WBC UR QL: >50

## 2023-01-04 PROCEDURE — 71045 X-RAY EXAM CHEST 1 VIEW: CPT | Mod: 26

## 2023-01-04 PROCEDURE — 93010 ELECTROCARDIOGRAM REPORT: CPT

## 2023-01-04 PROCEDURE — 99285 EMERGENCY DEPT VISIT HI MDM: CPT

## 2023-01-04 RX ORDER — ATORVASTATIN CALCIUM 80 MG/1
1 TABLET, FILM COATED ORAL
Qty: 0 | Refills: 0 | DISCHARGE

## 2023-01-04 RX ORDER — ASPIRIN/CALCIUM CARB/MAGNESIUM 324 MG
81 TABLET ORAL
Qty: 0 | Refills: 0 | DISCHARGE

## 2023-01-04 RX ORDER — ACETAMINOPHEN 500 MG
650 TABLET ORAL ONCE
Refills: 0 | Status: COMPLETED | OUTPATIENT
Start: 2023-01-04 | End: 2023-01-04

## 2023-01-04 RX ORDER — SODIUM CHLORIDE 9 MG/ML
2200 INJECTION INTRAMUSCULAR; INTRAVENOUS; SUBCUTANEOUS ONCE
Refills: 0 | Status: COMPLETED | OUTPATIENT
Start: 2023-01-04 | End: 2023-01-04

## 2023-01-04 RX ORDER — CEFTRIAXONE 500 MG/1
1000 INJECTION, POWDER, FOR SOLUTION INTRAMUSCULAR; INTRAVENOUS ONCE
Refills: 0 | Status: COMPLETED | OUTPATIENT
Start: 2023-01-04 | End: 2023-01-04

## 2023-01-04 RX ADMIN — SODIUM CHLORIDE 2200 MILLILITER(S): 9 INJECTION INTRAMUSCULAR; INTRAVENOUS; SUBCUTANEOUS at 22:34

## 2023-01-04 RX ADMIN — Medication 650 MILLIGRAM(S): at 21:59

## 2023-01-04 RX ADMIN — Medication 650 MILLIGRAM(S): at 21:32

## 2023-01-04 RX ADMIN — SODIUM CHLORIDE 2200 MILLILITER(S): 9 INJECTION INTRAMUSCULAR; INTRAVENOUS; SUBCUTANEOUS at 21:32

## 2023-01-04 NOTE — ED PROVIDER NOTE - OBJECTIVE STATEMENT
Patient brought in by family for difficulty walking.  Patient developed cold-like symptoms yesterday.  No fever reported.  Today developed some chills and had trouble walking.  Patient complains of some difficulty breathing but denies a cough.  No known fever.  No vomiting or diarrhea.  No urinary symptoms.  No headache.  No change in vision.  No change in speech.  No known sick contacts.

## 2023-01-04 NOTE — ED PROVIDER NOTE - CARDIAC, MLM
Normal rate, regular rhythm with occasional extrasystoles.  Heart sounds S1, S2.  No murmurs, rubs or gallops.

## 2023-01-04 NOTE — ED PROVIDER NOTE - CLINICAL SUMMARY MEDICAL DECISION MAKING FREE TEXT BOX
Patient with chills and weakness. Febrile in ED. Will get labs, cxr, viral swabs. Patient may need admission for infectious process

## 2023-01-04 NOTE — ED ADULT NURSE NOTE - NSIMPLEMENTINTERV_GEN_ALL_ED
Implemented All Fall Risk Interventions:  [place] to call system. Call bell, personal items and telephone within reach. Instruct patient to call for assistance. Room bathroom lighting operational. Non-slip footwear when patient is off stretcher. Physically safe environment: no spills, clutter or unnecessary equipment. Stretcher in lowest position, wheels locked, appropriate side rails in place. Provide visual cue, wrist band, yellow gown, etc. Monitor gait and stability. Monitor for mental status changes and reorient to person, place, and time. Review medications for side effects contributing to fall risk. Reinforce activity limits and safety measures with patient and family.

## 2023-01-05 DIAGNOSIS — N39.0 URINARY TRACT INFECTION, SITE NOT SPECIFIED: ICD-10-CM

## 2023-01-05 RX ORDER — TAMSULOSIN HYDROCHLORIDE 0.4 MG/1
0.4 CAPSULE ORAL AT BEDTIME
Refills: 0 | Status: DISCONTINUED | OUTPATIENT
Start: 2023-01-05 | End: 2023-01-07

## 2023-01-05 RX ORDER — HEPARIN SODIUM 5000 [USP'U]/ML
5000 INJECTION INTRAVENOUS; SUBCUTANEOUS EVERY 8 HOURS
Refills: 0 | Status: DISCONTINUED | OUTPATIENT
Start: 2023-01-05 | End: 2023-01-07

## 2023-01-05 RX ORDER — CEFTRIAXONE 500 MG/1
1000 INJECTION, POWDER, FOR SOLUTION INTRAMUSCULAR; INTRAVENOUS EVERY 24 HOURS
Refills: 0 | Status: COMPLETED | OUTPATIENT
Start: 2023-01-05 | End: 2023-01-07

## 2023-01-05 RX ORDER — ACETAMINOPHEN 500 MG
650 TABLET ORAL EVERY 6 HOURS
Refills: 0 | Status: DISCONTINUED | OUTPATIENT
Start: 2023-01-05 | End: 2023-01-07

## 2023-01-05 RX ORDER — DORZOLAMIDE HYDROCHLORIDE TIMOLOL MALEATE 20; 5 MG/ML; MG/ML
1 SOLUTION/ DROPS OPHTHALMIC
Refills: 0 | Status: DISCONTINUED | OUTPATIENT
Start: 2023-01-05 | End: 2023-01-07

## 2023-01-05 RX ORDER — LATANOPROST 0.05 MG/ML
1 SOLUTION/ DROPS OPHTHALMIC; TOPICAL AT BEDTIME
Refills: 0 | Status: DISCONTINUED | OUTPATIENT
Start: 2023-01-05 | End: 2023-01-07

## 2023-01-05 RX ORDER — INFLUENZA VIRUS VACCINE 15; 15; 15; 15 UG/.5ML; UG/.5ML; UG/.5ML; UG/.5ML
0.7 SUSPENSION INTRAMUSCULAR ONCE
Refills: 0 | Status: COMPLETED | OUTPATIENT
Start: 2023-01-05 | End: 2023-01-05

## 2023-01-05 RX ADMIN — DORZOLAMIDE HYDROCHLORIDE TIMOLOL MALEATE 1 DROP(S): 20; 5 SOLUTION/ DROPS OPHTHALMIC at 17:59

## 2023-01-05 RX ADMIN — TAMSULOSIN HYDROCHLORIDE 0.4 MILLIGRAM(S): 0.4 CAPSULE ORAL at 21:41

## 2023-01-05 RX ADMIN — HEPARIN SODIUM 5000 UNIT(S): 5000 INJECTION INTRAVENOUS; SUBCUTANEOUS at 14:59

## 2023-01-05 RX ADMIN — CEFTRIAXONE 1000 MILLIGRAM(S): 500 INJECTION, POWDER, FOR SOLUTION INTRAMUSCULAR; INTRAVENOUS at 00:39

## 2023-01-05 RX ADMIN — HEPARIN SODIUM 5000 UNIT(S): 5000 INJECTION INTRAVENOUS; SUBCUTANEOUS at 21:40

## 2023-01-05 RX ADMIN — CEFTRIAXONE 100 MILLIGRAM(S): 500 INJECTION, POWDER, FOR SOLUTION INTRAMUSCULAR; INTRAVENOUS at 06:00

## 2023-01-05 RX ADMIN — HEPARIN SODIUM 5000 UNIT(S): 5000 INJECTION INTRAVENOUS; SUBCUTANEOUS at 06:00

## 2023-01-05 RX ADMIN — CEFTRIAXONE 100 MILLIGRAM(S): 500 INJECTION, POWDER, FOR SOLUTION INTRAMUSCULAR; INTRAVENOUS at 00:07

## 2023-01-05 RX ADMIN — LATANOPROST 1 DROP(S): 0.05 SOLUTION/ DROPS OPHTHALMIC; TOPICAL at 21:40

## 2023-01-05 NOTE — PATIENT PROFILE ADULT - FALL HARM RISK - RISK INTERVENTIONS

## 2023-01-05 NOTE — H&P ADULT - HISTORY OF PRESENT ILLNESS
History obtained from Patient and Patients Niece   Patient currently AAO x 3       82 M with pmhx BPH, HTN brought in by family for cold, chills. Patient complains of some difficulty breathing but denies a cough.    No known fever.  No vomiting or diarrhea.  No urinary symptoms.    In the ed UA was positive patient received 1 dose of Ceftriaxone

## 2023-01-05 NOTE — CONSULT NOTE ADULT - SUBJECTIVE AND OBJECTIVE BOX
Date/Time Patient Seen:  		  Referring MD:   Data Reviewed	       Patient is a 82y old  Male who presents with a chief complaint of     Subjective/HPI    in bed  seen and examined  vs noted  labs reviewed  imaging reviewed  h and p reviewed  er provider note reviewed  alert  oriented  spoke with Niece  reviewed old records     · Chief Complaint: The patient is a 82y Male complaining of shortness of breath.  · HPI Objective Statement: Patient brought in by family for difficulty walking.  Patient developed cold-like symptoms yesterday.  No fever reported.  Today developed some chills and had trouble walking.  Patient complains of some difficulty breathing but denies a cough.  No known fever.  No vomiting or diarrhea.  No urinary symptoms.  No headache.  No change in vision.  No change in speech.  No known sick contacts.    HIV:    HIV Test Questions:  · In accordance with [place] State law, we offer every patient who comes to our ED an HIV test. Would you like to be tested today?	Opt out       PAST MEDICAL & SURGICAL HISTORY:  Hypertension    Hyperlipidemia    BPH (benign prostatic hyperplasia)    History of prostate surgery    PAST SURGICAL HISTORY:  History of prostate surgery.     Social History:  Social History (marital status, living situation, occupation, tobacco use, alcohol and drug use, and sexual history): - no toxic habits such as smoking, etoh use, or illegal drug use  - lives with wife     Tobacco Screening:  · Core Measure Site	No  · Has the patient used tobacco in the past 30 days?	No     Risk Assessment:    Present on Admission:  Deep Venous Thrombosis	no   Pulmonary Embolus	no      Heart Failure:  Does this patient have a history of or has been diagnosed with heart failure? unknown.      Medication list         MEDICATIONS  (STANDING):  cefTRIAXone   IVPB 1000 milliGRAM(s) IV Intermittent every 24 hours  dorzolamide 2%/timolol 0.5% Ophthalmic Solution 1 Drop(s) Both EYES two times a day  heparin   Injectable 5000 Unit(s) SubCutaneous every 8 hours  influenza  Vaccine (HIGH DOSE) 0.7 milliLiter(s) IntraMuscular once  latanoprost 0.005% Ophthalmic Solution 1 Drop(s) Both EYES at bedtime  tamsulosin 0.4 milliGRAM(s) Oral at bedtime    MEDICATIONS  (PRN):  acetaminophen     Tablet .. 650 milliGRAM(s) Oral every 6 hours PRN Temp greater or equal to 38C (100.4F)         Vitals log        ICU Vital Signs Last 24 Hrs  T(C): 37.8 (05 Jan 2023 07:59), Max: 38.6 (04 Jan 2023 20:11)  T(F): 100 (05 Jan 2023 07:59), Max: 101.4 (04 Jan 2023 20:11)  HR: 63 (05 Jan 2023 07:44) (57 - 78)  BP: 133/63 (05 Jan 2023 07:44) (121/59 - 157/67)  BP(mean): --  ABP: --  ABP(mean): --  RR: 18 (05 Jan 2023 07:44) (18 - 22)  SpO2: 97% (05 Jan 2023 07:44) (91% - 97%)    O2 Parameters below as of 05 Jan 2023 07:44  Patient On (Oxygen Delivery Method): room air                 Input and Output:  I&O's Detail    04 Jan 2023 07:01  -  05 Jan 2023 07:00  --------------------------------------------------------  IN:  Total IN: 0 mL    OUT:    Voided (mL): 400 mL  Total OUT: 400 mL    Total NET: -400 mL          Lab Data                        11.9   3.06  )-----------( 138      ( 04 Jan 2023 21:29 )             35.2     01-04    131<L>  |  99  |  24<H>  ----------------------------<  119<H>  3.8   |  20<L>  |  1.51<H>    Ca    8.1<L>      04 Jan 2023 21:29    TPro  6.8  /  Alb  3.3  /  TBili  0.5  /  DBili  x   /  AST  39  /  ALT  21  /  AlkPhos  80  01-04            Review of Systems	  weakness      Objective     Physical Examination    heart s1s2  lung dec BS  head nc  head at  verbal  alert  cn grossly int    Pertinent Lab findings & Imaging      Christopher:  NO   Adequate UO     I&O's Detail    04 Jan 2023 07:01  -  05 Jan 2023 07:00  --------------------------------------------------------  IN:  Total IN: 0 mL    OUT:    Voided (mL): 400 mL  Total OUT: 400 mL    Total NET: -400 mL               Discussed with:     Cultures:	        Radiology      EXAM:  US TTE W DOPPLER COMPLETE                                  PROCEDURE DATE:  05/07/2021          INTERPRETATION:  Ordering Physician: GENO GALLEGO [id number]    Indication: Shortness of breath, hypotension    Technician: ALBA    Study Quality: Fair    Height: 5 feet 9 inches  Weight: 168 pounds  Blood Pressure: 85/47    MEASUREMENTS  IVS: 1.0  PWT: 0.9  LA: 3.2  Aortic Root: 3.4  LVIDd: 4.5  LVIDs: 3.2    LVEF: 57%    FINDINGS  Left Ventricle: Left ventricle is of normal size and wall thickness overall systolic function is normal. The septal wall appears akinetic  Right Ventricle: Right ventricle is not well visualized  Left Atrium: Left atrium is normal  Right Atrium: Right atrium is not well visualized  Mitral Valve: Mitral valve has adequate leaflet excursion. There is mild mitral regurgitation. There is thickened mitral leaflets.  Aortic Valve: Aortic valve is sclerotic. There is trace aortic insufficiency.  Tricuspid Valve: Tricuspid valve reveals moderate tricuspid regurgitation. Right-sided pressures are elevated. Pulmonary artery pressure is estimated at 58 mmHg.  Pulmonic Valve: Pulmonic valve reveals mild pulmonic insufficiency  Pericardium/Pleura: Pericardium is not well visualized      IMPRESSION:  1. Normal left ventricular systolic function with question of septal akinesis. Mild mitral regurgitation. Moderate tricuspid regurgitation with elevated right-sided pressures.  2. mild pulmonic insufficiency.  3. calcified aortic valve.  4.              YOON ALEMAN M.D., ATTENDING CARDIOLOGIST  This document has been electronically signed. May  8 2021  4:17PM        EXAM:  CT PELVIS ONLY                          EXAM:  CT ABDOMEN ONLY                          EXAM:  CT CHEST                                  PROCEDURE DATE:  05/08/2021          INTERPRETATION:  CLINICAL INFORMATION: Septic shock with nausea and chills    COMPARISON: Same day chest x-ray, CT abdomen pelvis 5/6/2021    CONTRAST/COMPLICATIONS:  IV Contrast: NONE  0 cc administered   0 cc discarded  Oral Contrast: NONE  Complications: None reported at time of study completion    PROCEDURE:  CT of the Chest, Abdomen and Pelvis was performed.  Sagittal and coronal reformats were performed.    FINDINGS:  CHEST:  LUNGS AND LARGE AIRWAYS: The tip of the endotracheal tube is above the contreras. The central airways are patent. Pulmonary edema and bibasilar atelectasis.  PLEURA: Small bilateral effusions.  VESSELS: Normal caliber aorta. Right IJ line tip in SVC.  HEART: Normal heart size. No pericardial effusion. Coronary artery calcifications are present.  MEDIASTINUM AND KARLA: No adenopathy.  CHEST WALL AND LOWER NECK: No masses.    ABDOMEN AND PELVIS:  LIVER: Normal.  BILE DUCTS: Nondilated.  GALLBLADDER: Sludge and diffuse nonspecific wall edema.  SPLEEN: Normal.  PANCREAS: Normal.  ADRENALS: Normal.  KIDNEYS/URETERS: No hydronephrosis or urinary tract calculi.    BLADDER: Collapsed around a White catheter balloon. Multiple stones within the right and left posterior diverticula.  REPRODUCTIVE ORGANS: Nonenlarged.    BOWEL: No bowel-related abnormality.  PERITONEUM: No free air or ascites. No collection.  VESSELS: Aortoiliac atherosclerosis without aneurysm.  RETROPERITONEUM/LYMPH NODES: No adenopathy.  ABDOMINAL WALL: Normal.  BONES: No aggressive lesion.    IMPRESSION:  *  Pulmonary edema and small bilateral pleural effusions.  *  Multiple small stones layering within urinary bladder diverticula.              DAVON BOYD MD; Attending Radiologist  This document has been electronically signed. May  8 2021  6:23PM                  
  HPI:  83YO M HTN BPH hx of Strep septicemia, sec cystitis  who presented to the hospital with cc of difficulty walking and chills. In Ed UA with pyuria Tma x101.4. HORACIO.    Infectious Disease consult was called to evaluate pt and for antibiotic management.      Past Medical & Surgical Hx:  PAST MEDICAL & SURGICAL HISTORY:  Hypertension  Hyperlipidemia  BPH (benign prostatic hyperplasia)  History of prostate surgery      Social History--  EtOH: denies  Tobacco: denies  Drug Use: denies    FAMILY HISTORY:  Noncontributory      Allergies  No Known Allergies    Intolerances  NONE    Home Medications:  dorzolamide-timolol 2%-0.5% preservative-free ophthalmic solution: 1 drop(s) to each affected eye 2 times a day (2023 20:40)  Flomax 0.4 mg oral capsule: 1 cap(s) orally once a day (2023 20:40)  hydroCHLOROthiazide 25 mg oral tablet: 1 tab(s) orally once a day (2023 00:11)  latanoprost 0.005% ophthalmic solution: 1 drop(s) to each affected eye once a day (at bedtime) both eyes (2023 20:40)      Current Inpatient Medications :    ANTIBIOTICS:   cefTRIAXone   IVPB 1000 milliGRAM(s) IV Intermittent every 24 hours      OTHER RELEVANT MEDICATIONS :  acetaminophen     Tablet .. 650 milliGRAM(s) Oral every 6 hours PRN  dorzolamide 2%/timolol 0.5% Ophthalmic Solution 1 Drop(s) Both EYES two times a day  heparin   Injectable 5000 Unit(s) SubCutaneous every 8 hours  influenza  Vaccine (HIGH DOSE) 0.7 milliLiter(s) IntraMuscular once  latanoprost 0.005% Ophthalmic Solution 1 Drop(s) Both EYES at bedtime  tamsulosin 0.4 milliGRAM(s) Oral at bedtime    ROS:  CONSTITUTIONAL: + fever or chills, weakness  EYES:  Negative  blurry vision or double vision  CARDIOVASCULAR:  Negative for chest pain or palpitations  RESPIRATORY:  Negative for cough, wheezing, or SOB   GASTROINTESTINAL:  Negative for nausea, vomiting, diarrhea, constipation, or abdominal pain  GENITOURINARY:  Negative frequency, urgency , dysuria or hematuria   NEUROLOGIC:  No headache, confusion, dizziness, lightheadedness  All other systems were reviewed and are negative      I&O's Detail    2023 07:01  -  2023 07:00  --------------------------------------------------------  IN:  Total IN: 0 mL    OUT:    Voided (mL): 400 mL  Total OUT: 400 mL    Total NET: -400 mL          Physical Exam:  Vital Signs Last 24 Hrs  T(C): 37 (2023 15:06), Max: 38.6 (2023 20:11)  T(F): 98.6 (2023 15:06), Max: 101.4 (2023 20:11)  HR: 67 (2023 15:06) (57 - 78)  BP: 131/69 (2023 15:06) (121/59 - 157/67)  RR: 18 (2023 15:06) (18 - 22)  SpO2: 98% (2023 15:06) (91% - 98%)    Parameters below as of 2023 15:06  Patient On (Oxygen Delivery Method): nasal cannula  O2 Flow (L/min): 2    Height (cm): 175 ( @ 10:26)  Weight (kg): 71.5 ( @ 10:26)  BMI (kg/m2): 23.3 ( @ 10:26)  BSA (m2): 1.86 ( @ 10:26)    General: weak no acute distress  Neck: supple, trachea midline  Lungs: clear, no wheeze/rhonchi  Cardiovascular: regular rate and rhythm, S1 S2  Abdomen: soft, nontender, ND, bowel sounds normal  Neurological:  alert and oriented x3  Skin: no rash  Extremities: no edema    Labs:                         11.9   3.06  )-----------( 138      ( 2023 21:29 )             35.2   -    131<L>  |  99  |  24<H>  ----------------------------<  119<H>  3.8   |  20<L>  |  1.51<H>    Ca    8.1<L>      2023 21:29    TPro  6.8  /  Alb  3.3  /  TBili  0.5  /  DBili  x   /  AST  39  /  ALT  21  /  AlkPhos  80  -    Urinalysis Basic - ( 2023 22:50 )    Color: Yellow / Appearance: Turbid / S.015 / pH: x  Gluc: x / Ketone: Small  / Bili: Negative / Urobili: Negative mg/dL   Blood: x / Protein: 100 mg/dL / Nitrite: Negative   Leuk Esterase: Moderate / RBC: 25-50 /HPF / WBC >50   Sq Epi: x / Non Sq Epi: Occasional / Bacteria: Few      RECENT CULTURES:          RADIOLOGY & ADDITIONAL STUDIES:  ACC: [id number] EXAM:  XR CHEST PORTABLE URGENT 1V                          PROCEDURE DATE:  2023          INTERPRETATION:  AP chest on 2023 at 9:26 PM. Patient has   sepsis.    Heart magnified by technique.    Lung hyperexpansion issuspect.    There is a mild right upper thoracic curve.    There are old right rib fractures and old right clavicle fracture.    Lungs remain clear.    Chest is similar to 2022.    IMPRESSION: No acute finding or change.    Assessment :   83YO M HTN BPH hx of Strep septicemia, sec cystitis  admitted with sepsis sec UTI. In ED UA with pyuria Tma x101.4.   Leukopenia with bandemia sec sepsis  HORACIO.      Plan :   Cont Rocephin  Fu cultures  Trend temps and cbc  CT renal stone hunt rule out obstructive uropathy      Continue with present regiment .  Approptiate use of antibiotics and adverse effects reviewed.      > 45 minutes spent in direct patient care reviewing  the notes, lab data/ imaging , discussion with multidisciplinary team. All questions were addressed and answered to the best of my capacity .    Thank you for allowing me to participate in the care of your patient .      Frank Hernandez MD  Infectious Disease  [phone]

## 2023-01-05 NOTE — CONSULT NOTE ADULT - ASSESSMENT
82 male with hx of BPH and sepsis - hx of HTN - valv heart disease - pleural eff - pulm htn - OP - OA -   now with sob mckenna weakness    BPH  Pulm HTN  OP  OA  HTN  GERD  Pleural Eff  Atelectasis  hx of strep sepsis in the past  CKD    covid pcr neg - rvp neg   old records reviewed  cxr reviewed  labs reviewed  spoke with Niece  monitor VS and Sat  pt has Pulm HTN and HTN - optimization of cvs rx regimen and BP  TTE reviewed from prior records  dietary discretion  on emp ABX - cx - in progress

## 2023-01-05 NOTE — H&P ADULT - ASSESSMENT
Acute Metabolic Encephalopathy   UTI     iv Ceftriaxone , follow up urine cx     HORACIO dehydration   IV fluids     BPH Continue with Flomax                  UTI     iv Ceftriaxone , follow up urine cx     HORACIO dehydration   IV fluids     BPH Continue with Flomax            # Acute Hypoxic respiratory Failure Pleural effusions  Atelectasis CT   Oxygen, Nebs     Pulm consult appreciated   Spirometry   Echo     # UTI     iv Ceftriaxone , follow up urine cx     HORACIO dehydration   IV fluids     # BPH Continue with Flomax

## 2023-01-06 LAB
ANION GAP SERPL CALC-SCNC: 11 MMOL/L — SIGNIFICANT CHANGE UP (ref 5–17)
BASOPHILS # BLD AUTO: 0 K/UL — SIGNIFICANT CHANGE UP (ref 0–0.2)
BASOPHILS NFR BLD AUTO: 0 % — SIGNIFICANT CHANGE UP (ref 0–2)
BUN SERPL-MCNC: 13 MG/DL — SIGNIFICANT CHANGE UP (ref 7–23)
CALCIUM SERPL-MCNC: 7.9 MG/DL — LOW (ref 8.4–10.5)
CHLORIDE SERPL-SCNC: 103 MMOL/L — SIGNIFICANT CHANGE UP (ref 96–108)
CO2 SERPL-SCNC: 22 MMOL/L — SIGNIFICANT CHANGE UP (ref 22–31)
CREAT SERPL-MCNC: 0.97 MG/DL — SIGNIFICANT CHANGE UP (ref 0.5–1.3)
CULTURE RESULTS: SIGNIFICANT CHANGE UP
D DIMER BLD IA.RAPID-MCNC: 1050 NG/ML DDU — HIGH
EGFR: 77 ML/MIN/1.73M2 — SIGNIFICANT CHANGE UP
EOSINOPHIL # BLD AUTO: 0.04 K/UL — SIGNIFICANT CHANGE UP (ref 0–0.5)
EOSINOPHIL NFR BLD AUTO: 1 % — SIGNIFICANT CHANGE UP (ref 0–6)
GLUCOSE SERPL-MCNC: 88 MG/DL — SIGNIFICANT CHANGE UP (ref 70–99)
HCT VFR BLD CALC: 33.3 % — LOW (ref 39–50)
HGB BLD-MCNC: 11.6 G/DL — LOW (ref 13–17)
LYMPHOCYTES # BLD AUTO: 1.62 K/UL — SIGNIFICANT CHANGE UP (ref 1–3.3)
LYMPHOCYTES # BLD AUTO: 42 % — SIGNIFICANT CHANGE UP (ref 13–44)
MAGNESIUM SERPL-MCNC: 1.9 MG/DL — SIGNIFICANT CHANGE UP (ref 1.6–2.6)
MCHC RBC-ENTMCNC: 30.3 PG — SIGNIFICANT CHANGE UP (ref 27–34)
MCHC RBC-ENTMCNC: 34.8 GM/DL — SIGNIFICANT CHANGE UP (ref 32–36)
MCV RBC AUTO: 86.9 FL — SIGNIFICANT CHANGE UP (ref 80–100)
METAMYELOCYTES # FLD: 1 % — HIGH (ref 0–0)
MONOCYTES # BLD AUTO: 0.42 K/UL — SIGNIFICANT CHANGE UP (ref 0–0.9)
MONOCYTES NFR BLD AUTO: 11 % — SIGNIFICANT CHANGE UP (ref 2–14)
NEUTROPHILS # BLD AUTO: 1.39 K/UL — LOW (ref 1.8–7.4)
NEUTROPHILS NFR BLD AUTO: 32 % — LOW (ref 43–77)
NEUTS BAND # BLD: 4 % — SIGNIFICANT CHANGE UP (ref 0–8)
NRBC # BLD: 0 — SIGNIFICANT CHANGE UP
NRBC # BLD: SIGNIFICANT CHANGE UP /100 WBCS (ref 0–0)
PHOSPHATE SERPL-MCNC: 2.6 MG/DL — SIGNIFICANT CHANGE UP (ref 2.5–4.5)
PLAT MORPH BLD: NORMAL — SIGNIFICANT CHANGE UP
PLATELET # BLD AUTO: 101 K/UL — LOW (ref 150–400)
POTASSIUM SERPL-MCNC: 4 MMOL/L — SIGNIFICANT CHANGE UP (ref 3.5–5.3)
POTASSIUM SERPL-SCNC: 4 MMOL/L — SIGNIFICANT CHANGE UP (ref 3.5–5.3)
RBC # BLD: 3.83 M/UL — LOW (ref 4.2–5.8)
RBC # FLD: 12.2 % — SIGNIFICANT CHANGE UP (ref 10.3–14.5)
RBC BLD AUTO: NORMAL — SIGNIFICANT CHANGE UP
SODIUM SERPL-SCNC: 136 MMOL/L — SIGNIFICANT CHANGE UP (ref 135–145)
SPECIMEN SOURCE: SIGNIFICANT CHANGE UP
TSH SERPL-MCNC: 2.24 UIU/ML — SIGNIFICANT CHANGE UP (ref 0.27–4.2)
VARIANT LYMPHS # BLD: 9 % — HIGH (ref 0–6)
WBC # BLD: 3.86 K/UL — SIGNIFICANT CHANGE UP (ref 3.8–10.5)
WBC # FLD AUTO: 3.86 K/UL — SIGNIFICANT CHANGE UP (ref 3.8–10.5)

## 2023-01-06 PROCEDURE — 74176 CT ABD & PELVIS W/O CONTRAST: CPT | Mod: 26

## 2023-01-06 PROCEDURE — 71275 CT ANGIOGRAPHY CHEST: CPT | Mod: 26

## 2023-01-06 RX ADMIN — DORZOLAMIDE HYDROCHLORIDE TIMOLOL MALEATE 1 DROP(S): 20; 5 SOLUTION/ DROPS OPHTHALMIC at 17:19

## 2023-01-06 RX ADMIN — LATANOPROST 1 DROP(S): 0.05 SOLUTION/ DROPS OPHTHALMIC; TOPICAL at 21:37

## 2023-01-06 RX ADMIN — HEPARIN SODIUM 5000 UNIT(S): 5000 INJECTION INTRAVENOUS; SUBCUTANEOUS at 21:37

## 2023-01-06 RX ADMIN — CEFTRIAXONE 100 MILLIGRAM(S): 500 INJECTION, POWDER, FOR SOLUTION INTRAMUSCULAR; INTRAVENOUS at 05:20

## 2023-01-06 RX ADMIN — TAMSULOSIN HYDROCHLORIDE 0.4 MILLIGRAM(S): 0.4 CAPSULE ORAL at 21:37

## 2023-01-06 RX ADMIN — HEPARIN SODIUM 5000 UNIT(S): 5000 INJECTION INTRAVENOUS; SUBCUTANEOUS at 05:19

## 2023-01-06 RX ADMIN — DORZOLAMIDE HYDROCHLORIDE TIMOLOL MALEATE 1 DROP(S): 20; 5 SOLUTION/ DROPS OPHTHALMIC at 05:20

## 2023-01-06 RX ADMIN — HEPARIN SODIUM 5000 UNIT(S): 5000 INJECTION INTRAVENOUS; SUBCUTANEOUS at 14:15

## 2023-01-06 NOTE — PROGRESS NOTE ADULT - ASSESSMENT
82 male with hx of BPH and sepsis - hx of HTN - valv heart disease - pleural eff - pulm htn - OP - OA -   now with sob mckenna weakness    BPH  Pulm HTN  OP  OA  HTN  GERD  Pleural Eff  Atelectasis  hx of strep sepsis in the past  CKD    d dimer noted  ID eval noted  will check CTA chest    covid pcr neg - rvp neg   old records reviewed  cxr reviewed  labs reviewed  spoke with Niece  monitor VS and Sat  pt has Pulm HTN and HTN - optimization of cvs rx regimen and BP  TTE reviewed from prior records  dietary discretion  on emp ABX - cx - in progress

## 2023-01-06 NOTE — PROGRESS NOTE ADULT - ASSESSMENT
# Acute Hypoxic respiratory Failure Pleural effusions  Atelectasis CT   Oxygen, Nebs     Pulm consult appreciated   Spirometry   Echo     # UTI     iv Ceftriaxone , follow up urine cx     HORACIO dehydration   IV fluids     # BPH Continue with Flomax

## 2023-01-07 ENCOUNTER — TRANSCRIPTION ENCOUNTER (OUTPATIENT)
Age: 83
End: 2023-01-07

## 2023-01-07 VITALS
RESPIRATION RATE: 15 BRPM | HEART RATE: 64 BPM | OXYGEN SATURATION: 98 % | TEMPERATURE: 98 F | SYSTOLIC BLOOD PRESSURE: 135 MMHG | DIASTOLIC BLOOD PRESSURE: 67 MMHG

## 2023-01-07 PROCEDURE — 93005 ELECTROCARDIOGRAM TRACING: CPT

## 2023-01-07 PROCEDURE — 85025 COMPLETE CBC W/AUTO DIFF WBC: CPT

## 2023-01-07 PROCEDURE — 83605 ASSAY OF LACTIC ACID: CPT

## 2023-01-07 PROCEDURE — 85610 PROTHROMBIN TIME: CPT

## 2023-01-07 PROCEDURE — 87641 MR-STAPH DNA AMP PROBE: CPT

## 2023-01-07 PROCEDURE — 87070 CULTURE OTHR SPECIMN AEROBIC: CPT

## 2023-01-07 PROCEDURE — 80053 COMPREHEN METABOLIC PANEL: CPT

## 2023-01-07 PROCEDURE — 87637 SARSCOV2&INF A&B&RSV AMP PRB: CPT

## 2023-01-07 PROCEDURE — 96360 HYDRATION IV INFUSION INIT: CPT

## 2023-01-07 PROCEDURE — 81001 URINALYSIS AUTO W/SCOPE: CPT

## 2023-01-07 PROCEDURE — 99285 EMERGENCY DEPT VISIT HI MDM: CPT

## 2023-01-07 PROCEDURE — 71045 X-RAY EXAM CHEST 1 VIEW: CPT

## 2023-01-07 PROCEDURE — 74176 CT ABD & PELVIS W/O CONTRAST: CPT

## 2023-01-07 PROCEDURE — 85379 FIBRIN DEGRADATION QUANT: CPT

## 2023-01-07 PROCEDURE — 85730 THROMBOPLASTIN TIME PARTIAL: CPT

## 2023-01-07 PROCEDURE — 83735 ASSAY OF MAGNESIUM: CPT

## 2023-01-07 PROCEDURE — 84443 ASSAY THYROID STIM HORMONE: CPT

## 2023-01-07 PROCEDURE — 87449 NOS EACH ORGANISM AG IA: CPT

## 2023-01-07 PROCEDURE — 87086 URINE CULTURE/COLONY COUNT: CPT

## 2023-01-07 PROCEDURE — 87040 BLOOD CULTURE FOR BACTERIA: CPT

## 2023-01-07 PROCEDURE — 84100 ASSAY OF PHOSPHORUS: CPT

## 2023-01-07 PROCEDURE — 87640 STAPH A DNA AMP PROBE: CPT

## 2023-01-07 PROCEDURE — 80048 BASIC METABOLIC PNL TOTAL CA: CPT

## 2023-01-07 PROCEDURE — 36415 COLL VENOUS BLD VENIPUNCTURE: CPT

## 2023-01-07 PROCEDURE — 71275 CT ANGIOGRAPHY CHEST: CPT

## 2023-01-07 RX ORDER — SODIUM CHLORIDE 9 MG/ML
4 INJECTION INTRAMUSCULAR; INTRAVENOUS; SUBCUTANEOUS EVERY 12 HOURS
Refills: 0 | Status: DISCONTINUED | OUTPATIENT
Start: 2023-01-07 | End: 2023-01-07

## 2023-01-07 RX ORDER — TAMSULOSIN HYDROCHLORIDE 0.4 MG/1
1 CAPSULE ORAL
Qty: 0 | Refills: 0 | DISCHARGE

## 2023-01-07 RX ORDER — CEFTRIAXONE 500 MG/1
1000 INJECTION, POWDER, FOR SOLUTION INTRAMUSCULAR; INTRAVENOUS EVERY 24 HOURS
Refills: 0 | Status: DISCONTINUED | OUTPATIENT
Start: 2023-01-07 | End: 2023-01-07

## 2023-01-07 RX ORDER — AZITHROMYCIN 500 MG/1
500 TABLET, FILM COATED ORAL ONCE
Refills: 0 | Status: COMPLETED | OUTPATIENT
Start: 2023-01-07 | End: 2023-01-07

## 2023-01-07 RX ORDER — AZITHROMYCIN 500 MG/1
TABLET, FILM COATED ORAL
Refills: 0 | Status: DISCONTINUED | OUTPATIENT
Start: 2023-01-07 | End: 2023-01-07

## 2023-01-07 RX ORDER — CEFDITOREN PIVOXIL 400 MG/1
1 TABLET, FILM COATED ORAL
Qty: 10 | Refills: 0
Start: 2023-01-07 | End: 2023-01-11

## 2023-01-07 RX ORDER — AZITHROMYCIN 500 MG/1
500 TABLET, FILM COATED ORAL EVERY 24 HOURS
Refills: 0 | Status: DISCONTINUED | OUTPATIENT
Start: 2023-01-08 | End: 2023-01-07

## 2023-01-07 RX ORDER — TAMSULOSIN HYDROCHLORIDE 0.4 MG/1
1 CAPSULE ORAL
Qty: 0 | Refills: 0 | DISCHARGE
Start: 2023-01-07

## 2023-01-07 RX ORDER — HYDROCHLOROTHIAZIDE 25 MG
1 TABLET ORAL
Qty: 0 | Refills: 0 | DISCHARGE

## 2023-01-07 RX ADMIN — CEFTRIAXONE 100 MILLIGRAM(S): 500 INJECTION, POWDER, FOR SOLUTION INTRAMUSCULAR; INTRAVENOUS at 05:47

## 2023-01-07 RX ADMIN — DORZOLAMIDE HYDROCHLORIDE TIMOLOL MALEATE 1 DROP(S): 20; 5 SOLUTION/ DROPS OPHTHALMIC at 17:56

## 2023-01-07 RX ADMIN — HEPARIN SODIUM 5000 UNIT(S): 5000 INJECTION INTRAVENOUS; SUBCUTANEOUS at 05:47

## 2023-01-07 RX ADMIN — AZITHROMYCIN 255 MILLIGRAM(S): 500 TABLET, FILM COATED ORAL at 13:38

## 2023-01-07 RX ADMIN — HEPARIN SODIUM 5000 UNIT(S): 5000 INJECTION INTRAVENOUS; SUBCUTANEOUS at 13:33

## 2023-01-07 RX ADMIN — DORZOLAMIDE HYDROCHLORIDE TIMOLOL MALEATE 1 DROP(S): 20; 5 SOLUTION/ DROPS OPHTHALMIC at 05:47

## 2023-01-07 RX ADMIN — Medication 600 MILLIGRAM(S): at 17:57

## 2023-01-07 NOTE — DISCHARGE NOTE NURSING/CASE MANAGEMENT/SOCIAL WORK - NSDCPEFALRISK_GEN_ALL_CORE
For information on Fall & Injury Prevention, visit: [email or web address] OR  [email or web address] OR  https://www.cdc.gov/steadi/patient.html

## 2023-01-07 NOTE — PROGRESS NOTE ADULT - ASSESSMENT
83YO M HTN BPH hx of Strep septicemia, sec cystitis 5/21 admitted with sepsis sec UTI. In ED UA with pyuria   Sp fever  HORACIO resolved  Clinically improving but noted no sig growth in urine culture    RECOMMENDATIONS  concerns regarding possible PNA so axithro added, Cont Rocephin for now  additional testing ordered    Thank you for consulting us and involving us in the management of this most interesting and challenging case.  We will follow along in the care of this patient. Please call us at [phone] or text me directly on my cell# at [phone] with any concerns.   81YO M HTN BPH hx of Strep septicemia, sec cystitis 5/21 admitted with sepsis initially felt to be sec UTI. In ED UA with pyuria, fever  HORACIO, but noted no sig growth in urine culture    RECOMMENDATIONS  concerns regarding possible PNA so pako added, Cont Rocephin for now  additional testing ordered    Thank you for consulting us and involving us in the management of this most interesting and challenging case.  We will follow along in the care of this patient. Please call us at [phone] or text me directly on my cell# at [phone] with any concerns.

## 2023-01-07 NOTE — PROGRESS NOTE ADULT - ASSESSMENT
82 male with hx of BPH and sepsis - hx of HTN - valv heart disease - pleural eff - pulm htn - OP - OA -   now with sob mckenna weakness    BPH  Pulm HTN  OP  OA  HTN  GERD  Pleural Eff  Atelectasis  hx of strep sepsis in the past  CKD    ct chest reviewed - poss pna - chr changes - acute changes - tree in bud - bronchiectasis  will confer with ID - re ABX  bronchodilators  cough expectorant  will benefit from Pulm Follow up as outpatient with Dr Ho in [place] - Pulm MD       covid pcr neg - rvp neg   old records reviewed  cxr reviewed  labs reviewed  spoke with Niece  monitor VS and Sat  pt has Pulm HTN and HTN - optimization of cvs rx regimen and BP  TTE reviewed from prior records  dietary discretion

## 2023-01-07 NOTE — PROGRESS NOTE ADULT - SUBJECTIVE AND OBJECTIVE BOX
Date/Time Patient Seen:  		  Referring MD:   Data Reviewed	       Patient is a 82y old  Male who presents with a chief complaint of AMS x 2 days (05 Jan 2023 10:26)      Subjective/HPI     PAST MEDICAL & SURGICAL HISTORY:  Hypertension    Hyperlipidemia    BPH (benign prostatic hyperplasia)    History of prostate surgery          Medication list         MEDICATIONS  (STANDING):  cefTRIAXone   IVPB 1000 milliGRAM(s) IV Intermittent every 24 hours  dorzolamide 2%/timolol 0.5% Ophthalmic Solution 1 Drop(s) Both EYES two times a day  heparin   Injectable 5000 Unit(s) SubCutaneous every 8 hours  influenza  Vaccine (HIGH DOSE) 0.7 milliLiter(s) IntraMuscular once  latanoprost 0.005% Ophthalmic Solution 1 Drop(s) Both EYES at bedtime  tamsulosin 0.4 milliGRAM(s) Oral at bedtime    MEDICATIONS  (PRN):  acetaminophen     Tablet .. 650 milliGRAM(s) Oral every 6 hours PRN Temp greater or equal to 38C (100.4F)         Vitals log        ICU Vital Signs Last 24 Hrs  T(C): 36.4 (06 Jan 2023 05:09), Max: 37.1 (05 Jan 2023 21:26)  T(F): 97.5 (06 Jan 2023 05:09), Max: 98.7 (05 Jan 2023 21:26)  HR: 60 (06 Jan 2023 05:09) (60 - 70)  BP: 142/73 (06 Jan 2023 05:09) (131/69 - 147/76)  BP(mean): --  ABP: --  ABP(mean): --  RR: 18 (06 Jan 2023 05:09) (18 - 18)  SpO2: 100% (06 Jan 2023 05:09) (96% - 100%)    O2 Parameters below as of 06 Jan 2023 05:09  Patient On (Oxygen Delivery Method): nasal cannula                 Input and Output:  I&O's Detail      Lab Data                        11.6   3.86  )-----------( 101      ( 06 Jan 2023 06:20 )             33.3     01-06    136  |  103  |  13  ----------------------------<  88  4.0   |  22  |  0.97    Ca    7.9<L>      06 Jan 2023 06:20  Phos  2.6     01-06  Mg     1.9     01-06    TPro  6.8  /  Alb  3.3  /  TBili  0.5  /  DBili  x   /  AST  39  /  ALT  21  /  AlkPhos  80  01-04            Review of Systems	      Objective     Physical Examination    heart s1s2  lung dc BS  head nc      Pertinent Lab findings & Imaging      White:  NO   Adequate UO     I&O's Detail           Discussed with:     Cultures:	        Radiology                            
Date/Time Patient Seen:  		  Referring MD:   Data Reviewed	       Patient is a 82y old  Male who presents with a chief complaint of AMS x 2 days (06 Jan 2023 18:50)      Subjective/HPI     PAST MEDICAL & SURGICAL HISTORY:  Hypertension    Hyperlipidemia    BPH (benign prostatic hyperplasia)    History of prostate surgery          Medication list         MEDICATIONS  (STANDING):  dorzolamide 2%/timolol 0.5% Ophthalmic Solution 1 Drop(s) Both EYES two times a day  heparin   Injectable 5000 Unit(s) SubCutaneous every 8 hours  influenza  Vaccine (HIGH DOSE) 0.7 milliLiter(s) IntraMuscular once  latanoprost 0.005% Ophthalmic Solution 1 Drop(s) Both EYES at bedtime  tamsulosin 0.4 milliGRAM(s) Oral at bedtime    MEDICATIONS  (PRN):  acetaminophen     Tablet .. 650 milliGRAM(s) Oral every 6 hours PRN Temp greater or equal to 38C (100.4F)         Vitals log        ICU Vital Signs Last 24 Hrs  T(C): 36.7 (07 Jan 2023 05:44), Max: 36.7 (06 Jan 2023 11:02)  T(F): 98.1 (07 Jan 2023 05:44), Max: 98.1 (07 Jan 2023 05:44)  HR: 71 (07 Jan 2023 05:44) (64 - 71)  BP: 151/76 (07 Jan 2023 05:44) (126/66 - 151/76)  BP(mean): --  ABP: --  ABP(mean): --  RR: 17 (07 Jan 2023 05:44) (17 - 17)  SpO2: 99% (07 Jan 2023 05:44) (97% - 99%)    O2 Parameters below as of 07 Jan 2023 05:44  Patient On (Oxygen Delivery Method): room air                 Input and Output:  I&O's Detail      Lab Data                        11.6   3.86  )-----------( 101      ( 06 Jan 2023 06:20 )             33.3     01-06    136  |  103  |  13  ----------------------------<  88  4.0   |  22  |  0.97    Ca    7.9<L>      06 Jan 2023 06:20  Phos  2.6     01-06  Mg     1.9     01-06              Review of Systems	      Objective     Physical Examination    heart s1s2  lung dc BS  head nc      Pertinent Lab findings & Imaging      White:  NO   Adequate UO     I&O's Detail           Discussed with:     Cultures:	        Radiology                            
     ANN MARIE NAVARRETE is a yMale , patient examined and chart reviewed.    INTERVAL HPI/ OVERNIGHT EVENTS:   No events. Afebrile.    PAST MEDICAL & SURGICAL HISTORY:  Hypertension  Hyperlipidemia  BPH (benign prostatic hyperplasia)  History of prostate surgery        For details regarding the patient's social history, family history, and other miscellaneous elements, please refer the initial infectious diseases consultation and/or the admitting history and physical examination for this admission.    ROS:  CONSTITUTIONAL:  Negative fever or chills  EYES:  Negative  blurry vision or double vision  CARDIOVASCULAR:  Negative for chest pain or palpitations  RESPIRATORY:  Negative for cough, wheezing, or SOB   GASTROINTESTINAL:  Negative for nausea, vomiting, diarrhea, constipation, or abdominal pain  GENITOURINARY:  Negative frequency, urgency or dysuria  NEUROLOGIC:  No headache, confusion, dizziness, lightheadedness  All other systems were reviewed and are negative         Current inpatient medications :    ANTIBIOTICS/RELEVANT:  cefTRIAXone   IVPB 1000 milliGRAM(s) IV Intermittent every 24 hours      acetaminophen     Tablet .. 650 milliGRAM(s) Oral every 6 hours PRN  dorzolamide 2%/timolol 0.5% Ophthalmic Solution 1 Drop(s) Both EYES two times a day  heparin   Injectable 5000 Unit(s) SubCutaneous every 8 hours  latanoprost 0.005% Ophthalmic Solution 1 Drop(s) Both EYES at bedtime  tamsulosin 0.4 milliGRAM(s) Oral at bedtime      Objective:    T(C): 36.7 (23 @ 11:02), Max: 37.1 (23 @ 21:26)  HR: 64 (23 @ 11:02) (60 - 64)  BP: 126/66 (23 @ 11:02) (126/66 - 147/76)  RR: 17 (23 @ 11:02) (17 - 18)  SpO2: 97% (23 @ 11:02) (97% - 100%)      Physical Exam:  General: no acute distress  Neck: supple, trachea midline  Lungs: clear, no wheeze/rhonchi  Cardiovascular: regular rate and rhythm, S1 S2  Abdomen: soft, nontender,  bowel sounds normal  Neurological: alert and oriented x3  Skin: no rash  Extremities: no edema        LABS:                          11.6   3.86  )-----------( 101      ( 2023 06:20 )             33.3           136  |  103  |  13  ----------------------------<  88  4.0   |  22  |  0.97    Ca    7.9<L>      2023 06:20  Phos  2.6     -  Mg     1.9     -06    TPro  6.8  /  Alb  3.3  /  TBili  0.5  /  DBili  x   /  AST  39  /  ALT  21  /  AlkPhos  80  -04      PT/INR - ( 2023 22:07 )   PT: 14.8 sec;   INR: 1.25 ratio         PTT - ( 2023 22:07 )  PTT:35.4 sec  Urinalysis Basic - ( 2023 22:50 )    Color: Yellow / Appearance: Turbid / S.015 / pH: x  Gluc: x / Ketone: Small  / Bili: Negative / Urobili: Negative mg/dL   Blood: x / Protein: 100 mg/dL / Nitrite: Negative   Leuk Esterase: Moderate / RBC: 25-50 /HPF / WBC >50   Sq Epi: x / Non Sq Epi: Occasional / Bacteria: Few      MICROBIOLOGY:    Culture - Urine (collected 2023 22:50)  Source: Clean Catch Clean Catch (Midstream)  Final Report (2023 10:10):    <10,000 CFU/mL Normal Urogenital Fauzia    Culture - Blood (collected 2023 21:29)  Source: .Blood Blood-Peripheral  Preliminary Report (2023 15:01):    No growth to date.    Culture - Blood (collected 2023 21:29)  Source: .Blood Blood-Peripheral  Preliminary Report (2023 15:01):    No growth to date.      RADIOLOGY & ADDITIONAL STUDIES:    ACC: [id number] EXAM:  CT ANGIO CHEST PULM [facility]                          PROCEDURE DATE:  2023          INTERPRETATION:  CLINICAL INFORMATION: Elevated d-dimer, dyspnea and   pulmonary hypertension.    COMPARISON: CT chest 2021    CONTRAST/COMPLICATIONS:  IV Contrast: Omnipaque 350  90 cc administered   10 cc discarded  Oral Contrast: NONE  Complications: None reported at time of study completion    PROCEDURE:  CT Angiography of the Chest.  Sagittal and coronal reformats were performed as well as 3D (MIP)   reconstructions.    FINDINGS:    LUNGS AND AIRWAYS: Central airways are patent. Scattered bilateral   bronchiectasis with airways impaction most prominent of the left lower   lobe redemonstrated. New patchy parenchymal groundglass and tree in bud   nodularity of the right upper lobe likely infectious.  PLEURA: No pleural effusion or pneumothorax.  MEDIASTINUM AND KARLA: Prominent bilateral hilar nodes are redemonstrated.   Bilateral suprahilar peribronchovascular nodes/nodules measuring up to   9mm on the right, image 183 series 6, and 6 mm on the left image 187   series 6, are also stable. Patulous esophagus.  VESSELS: No pulmonary embolus. Bilateral perihilar regions of pulmonary   artery branch narrowing due to adjacent adenopathy. Main pulmonary artery   is enlarged, measuring 3.3 cm. This may reflect pulmonary arterial   hypertension.  HEART: Heart size is normal. Multivessel coronary artery calcification.   Trace pericardial fluid.  CHEST WALL AND LOWER NECK: No chest wall hematoma. Visualized thyroid is   unremarkable.  VISUALIZED UPPER ABDOMEN: Left lateral hepatic segment volume loss and   recanalization of the paraumbilical vein. Atheromatous changes of the   partially imaged celiac trunk and SMA. Moderate celiac origin stenosis.  BONES: Degenerative changes and osseous mineralization. Old healed   right-sided rib fractures and right clavicular fracture.    IMPRESSION:    No pulmonary embolus. Multivessel coronary artery calcifications.    Right upper lobe poorly defined groundglass with tree-in-bud nodularity   concerning for infection. Follow to resolution with repeat chest CT in   one month, or sooner as clinically warranted.    Mild bronchiectasis associated with distal airways impaction similar to   prior study. Bilateral mild hilar adenopathy and suprahilar   peribronchovascular nodes/nodules are stable, of unclear significance.   Attention on follow up is recommended.      ACC: [id number] EXAM:  CT RENAL STONE [facility]                          PROCEDURE DATE:  2023          INTERPRETATION:  CLINICAL INFORMATION: Urosepsis. Acute kidney injury    COMPARISON: 2021    CONTRAST/COMPLICATIONS:  IV Contrast: NONE  OralContrast: NONE  Complications: None reported at time of study completion    PROCEDURE:  CT of the Abdomen and Pelvis was performed.  Sagittal and coronal reformats were performed.    FINDINGS:  LOWER CHEST: Small bilateral atelectasis.    LIVER: Within normal limits.  BILE DUCTS: Normal caliber.  GALLBLADDER: Underdistended.  SPLEEN: Within normal limits.  PANCREAS: Within normal limits.  ADRENALS: Within normal limits.  KIDNEYS/URETERS: No evidence for a calculus in the kidneys or ureters. No   hydronephrosis. Small hypodense lesion in the left kidney, representing a   probable cyst.    BLADDER: Mild mural thickening of the urinary bladder, suggestive of   cholecystitis. Multiple urinary bladder diverticula are seen bilaterally   with the largest measuring 4.6 cm in the right posterior aspect.   Stranding is noted adjacent to this largest urinary bladder diverticulum,   likely representing diverticulitis.  REPRODUCTIVE ORGANS: The prostate is enlarged.    BOWEL: No bowel obstruction or grossly thickened bowel wall. Colonic   diverticulosis without evidence for colonic diverticulitis. Appendix   within normal limits.  PERITONEUM: No free air or ascites  VESSELS: Calcified atherosclerotic disease.  RETROPERITONEUM/LYMPH NODES: No lymphadenopathy.  ABDOMINAL WALL: Small fat-containing umbilical hernia.  BONES: Degenerative spondylosis.    IMPRESSION:  No evidence for a urinary calculus. No hydronephrosis.    Mild mural thickening of the urinary bladder, suggestive of   cholecystitis. Multiple urinary bladder diverticula are seen bilaterally   with the largest measuring 4.6 cm in the right posterior aspect.   Stranding is noted adjacent to this largest urinary bladder diverticulum,   likely representing urinary bladder diverticulitis.          Assessment :  81YO M HTN BPH hx of Strep septicemia, sec cystitis  admitted with sepsis sec UTI. In ED UA with pyuria   Sp fever  HORACIO resolved  Clinically improving    Plan :   Cont Rocephin for now  Fu cultures  Trend temps and cbc  Asp precautions      Continue with present regiment.  Appropriate use of antibiotics and adverse effects reviewed.      I have discussed the above plan of care with patient/ family in detail. They expressed understanding of the  treatment plan . Risks, benefits and alternatives discussed in detail. I have asked if they have any questions or concerns and appropriately addressed them to the best of my ability .    > 35 minutes were spent in direct patient care reviewing notes, medications ,labs data/ imaging , discussion with multidisciplinary team.    Thank you for allowing me to participate in care of your patient .    Frank Hernandez MD  Infectious Disease  [phone]
[facility] DIVISION of INFECTIOUS DISEASE  Manohar Jack MD PhD, Danuta Bey MD, Rola Madera MD, Enoch Sofia MD, Gennaro Roman MD  and providing coverage with Frank Hernandez MD  Providing Infectious Disease Consultations at [facility], [facility], [facility]    Office# [phone] to schedule follow up appointments  Answering Service for urgent calls or New Consults [phone]  Cell# to text for urgent issues Manohar Jack [phone]     infectious diseases progress note:    ANN MARIE NAVARRETE is a 82y y. o. Male patient    Overnight and events of the last 24hrs reviewed    Allergies    No Known Allergies    Intolerances        ANTIBIOTICS/RELEVANT:  antimicrobials  azithromycin  IVPB      cefTRIAXone   IVPB 1000 milliGRAM(s) IV Intermittent every 24 hours    immunologic:  influenza  Vaccine (HIGH DOSE) 0.7 milliLiter(s) IntraMuscular once    OTHER:  acetaminophen     Tablet .. 650 milliGRAM(s) Oral every 6 hours PRN  dorzolamide 2%/timolol 0.5% Ophthalmic Solution 1 Drop(s) Both EYES two times a day  guaiFENesin  milliGRAM(s) Oral every 12 hours  heparin   Injectable 5000 Unit(s) SubCutaneous every 8 hours  latanoprost 0.005% Ophthalmic Solution 1 Drop(s) Both EYES at bedtime  sodium chloride 3%  Inhalation 4 milliLiter(s) Inhalation every 12 hours  tamsulosin 0.4 milliGRAM(s) Oral at bedtime      Objective:  Vital Signs Last 24 Hrs  T(C): 36.7 (07 Jan 2023 05:44), Max: 36.7 (06 Jan 2023 11:02)  T(F): 98.1 (07 Jan 2023 05:44), Max: 98.1 (07 Jan 2023 05:44)  HR: 71 (07 Jan 2023 05:44) (64 - 71)  BP: 151/76 (07 Jan 2023 05:44) (126/66 - 151/76)  BP(mean): --  RR: 17 (07 Jan 2023 05:44) (17 - 17)  SpO2: 99% (07 Jan 2023 05:44) (97% - 99%)    Parameters below as of 07 Jan 2023 05:44  Patient On (Oxygen Delivery Method): room air        T(C): 36.7 (01-07-23 @ 05:44), Max: 37.1 (01-05-23 @ 21:26)  T(C): 36.7 (01-07-23 @ 05:44), Max: 38.6 (01-04-23 @ 20:11)  T(C): 36.7 (01-07-23 @ 05:44), Max: 38.6 (01-04-23 @ 20:11)    PHYSICAL EXAM:  HEENT: NC atraumatic  Neck: supple  Respiratory: no accessory muscle use, breathing comfortably  Cardiovascular: distant  Gastrointestinal: normal appearing, nondistended  Extremities: no clubbing, no cyanosis,        LABS:                          11.6   3.86  )-----------( 101      ( 06 Jan 2023 06:20 )             33.3       WBC  3.86 01-06 @ 06:20  3.06 01-04 @ 21:29      01-06    136  |  103  |  13  ----------------------------<  88  4.0   |  22  |  0.97    Ca    7.9<L>      06 Jan 2023 06:20  Phos  2.6     01-06  Mg     1.9     01-06        Creatinine, Serum: 0.97 mg/dL (01-06-23 @ 06:20)  Creatinine, Serum: 1.51 mg/dL (01-04-23 @ 21:29)                INFLAMMATORY MARKERS      MICROBIOLOGY:    Culture - Urine (01.04.23 @ 22:50)    Specimen Source: Clean Catch Clean Catch (Midstream)    Culture Results:   <10,000 CFU/mL Normal Urogenital Fauzia        RADIOLOGY & ADDITIONAL STUDIES:  
Patient is a 82y old  Male who presents with a chief complaint of AMS x 2 days (06 Jan 2023 17:57)      SUBJECTIVE / OVERNIGHT EVENTS: no events     MEDICATIONS  (STANDING):  cefTRIAXone   IVPB 1000 milliGRAM(s) IV Intermittent every 24 hours  dorzolamide 2%/timolol 0.5% Ophthalmic Solution 1 Drop(s) Both EYES two times a day  heparin   Injectable 5000 Unit(s) SubCutaneous every 8 hours  influenza  Vaccine (HIGH DOSE) 0.7 milliLiter(s) IntraMuscular once  latanoprost 0.005% Ophthalmic Solution 1 Drop(s) Both EYES at bedtime  tamsulosin 0.4 milliGRAM(s) Oral at bedtime    MEDICATIONS  (PRN):  acetaminophen     Tablet .. 650 milliGRAM(s) Oral every 6 hours PRN Temp greater or equal to 38C (100.4F)      CAPILLARY BLOOD GLUCOSE      PHYSICAL EXAM:  GENERAL: NAD, well-developed  HEAD:  Atraumatic, Normocephalic  EYES: EOMI, PERRLA, conjunctiva and sclera clear  NECK: Supple, No JVD  CHEST/LUNG: Clear to auscultation bilaterally; No wheeze  HEART: Regular rate and rhythm; No murmurs, rubs, or gallops  ABDOMEN: Soft, Nontender, Nondistended; Bowel sounds present  EXTREMITIES:  2+ Peripheral Pulses, No clubbing, cyanosis, or edema  PSYCH: AAOx3  NEUROLOGY: non-focal  SKIN: No rashes or lesions    LABS:                        11.6   3.86  )-----------( 101      ( 06 Jan 2023 06:20 )             33.3     01-06    136  |  103  |  13  ----------------------------<  88  4.0   |  22  |  0.97    Ca    7.9<L>      06 Jan 2023 06:20  Phos  2.6     01-06  Mg     1.9     01-06                RADIOLOGY & ADDITIONAL TESTS:    Imaging Personally Reviewed:    Consultant(s) Notes Reviewed:      Care Discussed with Consultants/Other Providers:

## 2023-01-07 NOTE — DISCHARGE NOTE PROVIDER - HOSPITAL COURSE
HPI:  History obtained from Patient and Patients Niece   Patient currently AAO x 3       82 M with pmhx BPH, HTN brought in by family for cold, chills. Patient complains of some difficulty breathing but denies a cough.    No known fever.  No vomiting or diarrhea.  No urinary symptoms.    In the ed UA was positive patient received 1 dose of Ceftriaxone  (05 Jan 2023 10:26)  Stared patient on Ceftriaxone   CT abdomen shows pleural effusions possible Pneumonia   Added Zithromax today   Blood, Urine cx no growth     D/C planning on Vantin   D/C planning today

## 2023-01-07 NOTE — DISCHARGE NOTE PROVIDER - NSDCMRMEDTOKEN_GEN_ALL_CORE_FT
dorzolamide-timolol 2%-0.5% preservative-free ophthalmic solution: 1 drop(s) to each affected eye 2 times a day  Flomax 0.4 mg oral capsule: 1 cap(s) orally once a day  hydroCHLOROthiazide 25 mg oral tablet: 1 tab(s) orally once a day  latanoprost 0.005% ophthalmic solution: 1 drop(s) to each affected eye once a day (at bedtime) both eyes   cefditoren 200 mg oral tablet: 1 tab(s) orally 2 times a day   dorzolamide-timolol 2%-0.5% preservative-free ophthalmic solution: 1 drop(s) to each affected eye 2 times a day  latanoprost 0.005% ophthalmic solution: 1 drop(s) to each affected eye once a day (at bedtime) both eyes  tamsulosin 0.4 mg oral capsule: 1 cap(s) orally once a day (at bedtime)   dorzolamide-timolol 2%-0.5% preservative-free ophthalmic solution: 1 drop(s) to each affected eye 2 times a day  latanoprost 0.005% ophthalmic solution: 1 drop(s) to each affected eye once a day (at bedtime) both eyes  levoFLOXacin 500 mg oral tablet: 1 tab(s) orally every 24 hours   tamsulosin 0.4 mg oral capsule: 1 cap(s) orally once a day (at bedtime)

## 2023-01-07 NOTE — DISCHARGE NOTE NURSING/CASE MANAGEMENT/SOCIAL WORK - PATIENT PORTAL LINK FT
You can access the FollowMyHealth Patient Portal offered by [facility] by registering at the following website: [email or web address]. By joining [employer]’s FollowMyHealth portal, you will also be able to view your health information using other applications (apps) compatible with our system.

## 2023-01-07 NOTE — DISCHARGE NOTE PROVIDER - CARE PROVIDER_API CALL
Jacinta Schrader)  Internal Medicine  [address] [place] [address]  Phone: [phone]  Fax: [phone]  Follow Up Time:

## 2023-01-07 NOTE — DISCHARGE NOTE PROVIDER - NSDCCPCAREPLAN_GEN_ALL_CORE_FT
PRINCIPAL DISCHARGE DIAGNOSIS  Diagnosis: Pneumonia  Assessment and Plan of Treatment: po abx as prescribed      SECONDARY DISCHARGE DIAGNOSES  Diagnosis: Acute UTI  Assessment and Plan of Treatment: ruled out cx neg

## 2023-01-08 LAB
GRAM STN FLD: SIGNIFICANT CHANGE UP
MRSA PCR RESULT.: SIGNIFICANT CHANGE UP
S AUREUS DNA NOSE QL NAA+PROBE: SIGNIFICANT CHANGE UP
SPECIMEN SOURCE: SIGNIFICANT CHANGE UP

## 2023-01-08 RX ORDER — LEVOFLOXACIN 5 MG/ML
1 INJECTION, SOLUTION INTRAVENOUS
Qty: 3 | Refills: 0
Start: 2023-01-08 | End: 2023-01-10

## 2023-01-08 RX ORDER — CEFDITOREN PIVOXIL 400 MG/1
1 TABLET, FILM COATED ORAL
Qty: 6 | Refills: 0
Start: 2023-01-08 | End: 2023-01-10

## 2023-01-09 ENCOUNTER — RX RENEWAL (OUTPATIENT)
Age: 83
End: 2023-01-09

## 2023-01-09 LAB
CULTURE RESULTS: SIGNIFICANT CHANGE UP
LEGIONELLA AG UR QL: NEGATIVE — SIGNIFICANT CHANGE UP
SPECIMEN SOURCE: SIGNIFICANT CHANGE UP

## 2023-01-10 LAB
CULTURE RESULTS: SIGNIFICANT CHANGE UP
CULTURE RESULTS: SIGNIFICANT CHANGE UP
SPECIMEN SOURCE: SIGNIFICANT CHANGE UP
SPECIMEN SOURCE: SIGNIFICANT CHANGE UP

## 2023-02-02 ENCOUNTER — NON-APPOINTMENT (OUTPATIENT)
Age: 83
End: 2023-02-02

## 2023-02-10 NOTE — PROCEDURE NOTE - NSCATHTYPE_GEN_A_CORE
oriented to person, place and time, normal sensation, short and long term memory intact, sensory exam intact
CVC

## 2023-02-13 ENCOUNTER — APPOINTMENT (OUTPATIENT)
Dept: FAMILY MEDICINE | Facility: CLINIC | Age: 83
End: 2023-02-13
Payer: MEDICARE

## 2023-02-13 ENCOUNTER — RESULT REVIEW (OUTPATIENT)
Age: 83
End: 2023-02-13

## 2023-02-13 VITALS
HEIGHT: 68 IN | RESPIRATION RATE: 16 BRPM | OXYGEN SATURATION: 96 % | TEMPERATURE: 98.9 F | SYSTOLIC BLOOD PRESSURE: 135 MMHG | DIASTOLIC BLOOD PRESSURE: 80 MMHG | BODY MASS INDEX: 23.64 KG/M2 | HEART RATE: 68 BPM | WEIGHT: 156 LBS

## 2023-02-13 DIAGNOSIS — J18.9 PNEUMONIA, UNSPECIFIED ORGANISM: ICD-10-CM

## 2023-02-13 PROCEDURE — 99214 OFFICE O/P EST MOD 30 MIN: CPT

## 2023-02-13 RX ORDER — ATORVASTATIN CALCIUM 20 MG/1
20 TABLET, FILM COATED ORAL
Qty: 90 | Refills: 3 | Status: COMPLETED | COMMUNITY
Start: 2021-06-25 | End: 2023-02-13

## 2023-02-13 NOTE — PLAN
[FreeTextEntry1] : -reviewed results to CT chest from hospital - " RUL poorly defined ground-glass with tree-in-bud nodularity concerning for infection, mild bronchiectasis associated with distal airway impaction similar to previous study, hilar adenopathy,peribronchovascular nodes \par -reviewed DC note, most recent hospital ID and Pulm note \par -reviewed most recent UC note from 2/3\par -will repeat CT scan for f/u also given current symptoms \par -discussed using Mucinex, albuterol PRN, if symptom do not improve will start Z-pack and f/u \par -recommended outpatient follow-up w/ Pulm, referral provided \par \par \par -reviewed blood work with patient and niece: TSH/CMP/A1c (5.6) wnl,  Cholesterol: total: 192, HDL: 38,  (as per daughter now off statin as per cardio)

## 2023-02-13 NOTE — PHYSICAL EXAM
[No Respiratory Distress] : no respiratory distress  [No Accessory Muscle Use] : no accessory muscle use [Normal] : affect was normal and insight and judgment were intact [de-identified] : diffuse wheezing

## 2023-02-13 NOTE — HISTORY OF PRESENT ILLNESS
[FreeTextEntry1] : follow-up   [de-identified] : 83 yo male PMH gout, HLD, urosepsis, visual impairment presenting today for follow-up. \par He is accompanied by his niece.  \par He was hospitalized for UTI/PNA about 1.5 months ago, discharged 1/6/23.  \par Last week his niece noticed that he was coughing, he was seen at urgent care on 2/3, given doxy/amoxicillin/prednisone/albuterol, he completed course of abx and prednisone. \par Denies fevers, chills, or any other symptoms other than wheezing/cough.

## 2023-02-17 ENCOUNTER — OUTPATIENT (OUTPATIENT)
Dept: OUTPATIENT SERVICES | Facility: HOSPITAL | Age: 83
LOS: 1 days | End: 2023-02-17
Payer: MEDICARE

## 2023-02-17 ENCOUNTER — APPOINTMENT (OUTPATIENT)
Dept: CT IMAGING | Facility: CLINIC | Age: 83
End: 2023-02-17
Payer: MEDICARE

## 2023-02-17 DIAGNOSIS — J18.9 PNEUMONIA, UNSPECIFIED ORGANISM: ICD-10-CM

## 2023-02-17 DIAGNOSIS — Z98.890 OTHER SPECIFIED POSTPROCEDURAL STATES: Chronic | ICD-10-CM

## 2023-02-17 PROCEDURE — 71260 CT THORAX DX C+: CPT | Mod: 26

## 2023-02-17 PROCEDURE — 71260 CT THORAX DX C+: CPT

## 2023-02-23 ENCOUNTER — NON-APPOINTMENT (OUTPATIENT)
Age: 83
End: 2023-02-23

## 2023-03-20 ENCOUNTER — RESULT REVIEW (OUTPATIENT)
Age: 83
End: 2023-03-20

## 2023-03-20 ENCOUNTER — APPOINTMENT (OUTPATIENT)
Dept: FAMILY MEDICINE | Facility: CLINIC | Age: 83
End: 2023-03-20
Payer: MEDICARE

## 2023-03-20 ENCOUNTER — NON-APPOINTMENT (OUTPATIENT)
Age: 83
End: 2023-03-20

## 2023-03-20 VITALS
TEMPERATURE: 97.1 F | HEART RATE: 66 BPM | RESPIRATION RATE: 12 BRPM | SYSTOLIC BLOOD PRESSURE: 112 MMHG | OXYGEN SATURATION: 95 % | WEIGHT: 152.38 LBS | HEIGHT: 68 IN | BODY MASS INDEX: 23.09 KG/M2 | DIASTOLIC BLOOD PRESSURE: 60 MMHG

## 2023-03-20 PROCEDURE — 99214 OFFICE O/P EST MOD 30 MIN: CPT

## 2023-03-20 NOTE — REVIEW OF SYSTEMS
[Wheezing] : wheezing [Cough] : cough [Negative] : Cardiovascular [Shortness Of Breath] : no shortness of breath

## 2023-03-20 NOTE — HISTORY OF PRESENT ILLNESS
[FreeTextEntry1] : follow-up   [de-identified] : 83 yo male PMH gout, HLD, urosepsis, visual impairment presenting today for follow-up. \par He is accompanied by his niece Kedar. \par Since our last visit he followed up with his cardiologist who placed him on Levaquin, he completed course. \par Daughter notes no worsening/improving of symptoms still has some wheezing and occasional cough. \par She denies any coughing with eating.

## 2023-03-20 NOTE — PLAN
[FreeTextEntry1] : -Discussed possible silent aspiration? - f/u MBS \par -trial of nebulizer \par -discussed results from hospital CT January and repeat CT in Feb - bronchiectasis? / nodules/ground glass opacities, has follow-up with Pulm this week

## 2023-03-20 NOTE — PHYSICAL EXAM
[No Respiratory Distress] : no respiratory distress  [No Accessory Muscle Use] : no accessory muscle use [Normal] : normal rate, regular rhythm, normal S1 and S2 and no murmur heard [de-identified] : diffuse wheezing

## 2023-03-24 ENCOUNTER — NON-APPOINTMENT (OUTPATIENT)
Age: 83
End: 2023-03-24

## 2023-03-24 ENCOUNTER — RX CHANGE (OUTPATIENT)
Age: 83
End: 2023-03-24

## 2023-03-24 ENCOUNTER — APPOINTMENT (OUTPATIENT)
Dept: PULMONOLOGY | Facility: CLINIC | Age: 83
End: 2023-03-24
Payer: MEDICARE

## 2023-03-24 ENCOUNTER — APPOINTMENT (OUTPATIENT)
Dept: OTOLARYNGOLOGY | Facility: CLINIC | Age: 83
End: 2023-03-24
Payer: MEDICARE

## 2023-03-24 VITALS
HEIGHT: 68 IN | BODY MASS INDEX: 23.04 KG/M2 | OXYGEN SATURATION: 96 % | SYSTOLIC BLOOD PRESSURE: 110 MMHG | HEART RATE: 66 BPM | TEMPERATURE: 97 F | WEIGHT: 152 LBS | DIASTOLIC BLOOD PRESSURE: 70 MMHG

## 2023-03-24 DIAGNOSIS — H61.23 IMPACTED CERUMEN, BILATERAL: ICD-10-CM

## 2023-03-24 DIAGNOSIS — R06.2 WHEEZING: ICD-10-CM

## 2023-03-24 PROCEDURE — 99214 OFFICE O/P EST MOD 30 MIN: CPT | Mod: 25

## 2023-03-24 PROCEDURE — 69210 REMOVE IMPACTED EAR WAX UNI: CPT

## 2023-03-24 PROCEDURE — 31575 DIAGNOSTIC LARYNGOSCOPY: CPT

## 2023-03-24 PROCEDURE — 99204 OFFICE O/P NEW MOD 45 MIN: CPT

## 2023-03-24 RX ORDER — AZITHROMYCIN 250 MG/1
250 TABLET, FILM COATED ORAL
Qty: 1 | Refills: 0 | Status: DISCONTINUED | COMMUNITY
Start: 2023-02-13 | End: 2023-03-24

## 2023-03-24 NOTE — ASSESSMENT
[FreeTextEntry1] : 81y/o male never smoker from [place]\par \par 1-  wheezing likely asthma\par 2-  h/o recurrent pneumonia with possible GERD  aspiration\par 3-  recurrent  UTI /  prostate  \par 4- deconditioning\par 5 - vaccinations \par \par Recommendations\par 1- add  budesonide q 12\par 2-  add  performist q 12\par 3- f/u ct chest\par 4- ENT upper airway inspection\par 5- swallowing  test ordered\par \par return in  3- 6  weeks agrees

## 2023-03-24 NOTE — REVIEW OF SYSTEMS
[Cough] : cough [Wheezing] : wheezing [SOB on Exertion] : sob on exertion [Fever] : no fever [Recent Wt Gain (___ Lbs)] : ~T no recent weight gain [Chills] : no chills [Recent Wt Loss (___ Lbs)] : ~T no recent weight loss [Dry Eyes] : no dry eyes [Hemoptysis] : no hemoptysis [Chest Tightness] : no chest tightness [Sputum] : no sputum [Dyspnea] : no dyspnea [GERD] : no gerd [Abdominal Pain] : no abdominal pain [Nausea] : no nausea [Vomiting] : no vomiting [Dysphagia] : no dysphagia [Bleeding] : no bleeding [Myalgias] : no myalgias [Blood Transfusion] : no blood transfusion [Clotting Disorder/ Frequent bleeding] : no clotting disorder/ frequent bleeding [Diabetes] : no diabetes [Thyroid Problem] : no thyroid problem [Obesity] : no obesity [TextBox_91] : cane    bc   blind one eye       glaucoma  and    balance issues

## 2023-03-24 NOTE — PROCEDURE
[Unable to Cooperate with Mirror] : patient unable to cooperate with mirror [Topical Lidocaine] : topical lidocaine [Oxymetazoline HCl] : oxymetazoline HCl [Flexible Endoscope] : examined with the flexible endoscope [Serial Number: ___] : Serial Number: [unfilled] [Normal] : posterior cricoid area had healthy pink mucosa in the interarytenoid area and the esophageal inlet [Cerumen Impaction] : Cerumen Impaction [Same] : same as the Pre Op Dx. [] : Removal of Cerumen [FreeTextEntry1] : bilateral cerumen impaction [FreeTextEntry6] : Cerumen removed for both EAC with peroxide and suctioning

## 2023-03-24 NOTE — HISTORY OF PRESENT ILLNESS
[de-identified] : Mr. NAVARRETE is a 82 year male who presents for airway evaluation.  Patient was just seen by his pulmonologist, Dr. Ramirez for wheezing.  His daughter noted that he has a history of being intubated for about 4 days due to sepsis secondary to a UTI.  Since then, she reports that he has been having occasional wheezing.  Denies any shortness of breath, no dysphagia, no reflux, no voice changes, occasional coughing [Difficulty Swallowing] : no difficulty swallowing [Hoarseness] : no hoarseness [Shortness of Breath] : no shortness of breath [Vomiting] : no vomiting

## 2023-03-24 NOTE — HISTORY OF PRESENT ILLNESS
[Never] : never [TextBox_4] : 81y/o   male   born in [place]     never smoker         h/o  UTI    sepsis   2021 intubation 3-4 days -  here for wheezing OOH pneumonia\par \par Discharge Date	07-Jan-2023\par Admission Date	05-Jan-2023 00:04\par Reason for Admission	AMS x 2 days\par Hospital Course	\par HPI:\par History obtained from Patient and Patients Niece\par Patient currently AAO x 3\par  \par  \par 82 M with pmhx BPH, HTN brought in by family for cold, chills. Patient\par complains of some difficulty breathing but denies a cough.\par No known fever.  No vomiting or diarrhea.  No urinary symptoms.\par  \par In the ed UA was positive patient received 1 dose of Ceftriaxone  (05 Jan 2023\par 10:26)\par Stared patient on Ceftriaxone\par CT abdomen shows pleural effusions possible Pneumonia\par Added Zithromax today\par Blood, Urine cx no growth\par  \par D/C planning on Vantin\par D/C planning today\par  \par  \par  \par Med Reconciliation:\par Medication Reconciliation Status	Admission Reconciliation is Not Complete\par Discharge \par \par -baseline walks with cane\par - wheezing intermittently \par -recurrent    infection and antibiotics

## 2023-03-24 NOTE — CONSULT LETTER
[Dear  ___] : Dear  [unfilled], [Courtesy Letter:] : I had the pleasure of seeing your patient, [unfilled], in my office today. [Please see my note below.] : Please see my note below. [Sincerely,] : Sincerely, [FreeTextEntry2] : Rasheeda Ramirez MD ([facility] Physician)\par  [FreeTextEntry3] : Kathrine Flores, LUZ, PA-C\par Sr. Physician Assistant, Otolaryngology at [facility]\par Adjunct Clinical Instructor, Department of Physician Assistant Studies, [facility]\par \par [facility]\par [address]\[place] [address]\par  [DrPaul  ___] : Dr. MEDEROS

## 2023-03-24 NOTE — REASON FOR VISIT
[Initial] : an initial visit [Abnormal CXR/ Chest CT] : an abnormal CXR/ chest CT [Wheezing] : wheezing [Family Member] : family member

## 2023-03-24 NOTE — PHYSICAL EXAM
[de-identified] : +cerumen impaction in both ears, R>L - removed. [Midline] : trachea located in midline position [Normal] : no rashes

## 2023-03-24 NOTE — REVIEW OF SYSTEMS
[Sneezing] : no sneezing [Post Nasal Drip] : no post nasal drip [As Noted in HPI] : as noted in HPI [Hoarseness] : no hoarseness [Throat Clearing] : no throat clearing [Throat Pain] : no throat pain [Throat Dryness] : no throat dryness [Negative] : Heme/Lymph

## 2023-03-31 ENCOUNTER — NON-APPOINTMENT (OUTPATIENT)
Age: 83
End: 2023-03-31

## 2023-03-31 ENCOUNTER — OUTPATIENT (OUTPATIENT)
Dept: OUTPATIENT SERVICES | Facility: HOSPITAL | Age: 83
LOS: 1 days | End: 2023-03-31
Payer: MEDICARE

## 2023-03-31 DIAGNOSIS — R93.89 ABNORMAL FINDINGS ON DIAGNOSTIC IMAGING OF OTHER SPECIFIED BODY STRUCTURES: ICD-10-CM

## 2023-03-31 DIAGNOSIS — Z98.890 OTHER SPECIFIED POSTPROCEDURAL STATES: Chronic | ICD-10-CM

## 2023-03-31 PROCEDURE — 74230 X-RAY XM SWLNG FUNCJ C+: CPT

## 2023-03-31 PROCEDURE — 74230 X-RAY XM SWLNG FUNCJ C+: CPT | Mod: 26

## 2023-03-31 PROCEDURE — 92611 MOTION FLUOROSCOPY/SWALLOW: CPT

## 2023-04-16 NOTE — ED PROVIDER NOTE - TOBACCO USE
Interval Events:      CAPILLARY BLOOD GLUCOSE      POCT Blood Glucose.: 133 mg/dL (16 Apr 2023 13:06)  POCT Blood Glucose.: 82 mg/dL (16 Apr 2023 09:57)  POCT Blood Glucose.: 112 mg/dL (16 Apr 2023 03:37)  POCT Blood Glucose.: 82 mg/dL (15 Apr 2023 22:19)  POCT Blood Glucose.: 71 mg/dL (15 Apr 2023 18:52)  POCT Blood Glucose.: 84 mg/dL (15 Apr 2023 17:24)        [] All review of systems performed and negative, unlisted commented here:    Allergies    No Known Allergies    Intolerances      Endocrine/Metabolic Medications:  insulin glargine SubCutaneous Injection (LANTUS) - Peds 11 Unit(s) SubCutaneous at bedtime      Vital Signs Last 24 Hrs  T(C): 36.8 (16 Apr 2023 10:02), Max: 36.9 (15 Apr 2023 22:00)  T(F): 98.2 (16 Apr 2023 10:02), Max: 98.4 (15 Apr 2023 22:00)  HR: 76 (16 Apr 2023 10:02) (74 - 95)  BP: 101/62 (16 Apr 2023 10:02) (94/54 - 108/70)  BP(mean): --  RR: 18 (16 Apr 2023 10:02) (18 - 18)  SpO2: 98% (16 Apr 2023 10:02) (97% - 98%)    Parameters below as of 16 Apr 2023 10:02  Patient On (Oxygen Delivery Method): room air          PHYSICAL EXAM  All physical exam findings normal, except those marked:  General:	Alert, active, cooperative, NAD, well hydrated  .		[] Abnormal:  Neck		Normal: supple, no cervical adenopathy, no palpable thyroid  .		[] Abnormal:  Cardiovascular	Normal: regular rate, normal S1, S2, no murmurs  .		[] Abnormal:  Respiratory	Normal: no chest wall deformity, normal respiratory pattern, CTA B/L  .		[] Abnormal:  Abdominal	Normal: soft, ND, NT, bowel sounds present, no masses, no organomegaly  .		[] Abnormal:  		Normal normal genitalia, testes descended, circumcised/uncircumcised  .		Rosy stage:			Breast rosy:  .		Menstrual history:  .		[] Abnormal:  Extremities	Normal: FROM x4  .		[] Abnormal:  Skin		Normal: intact and not indurated, no rash, no acanthosis nigricans  .		[] Abnormal:  Neurologic	Normal: grossly intact  .		[] Abnormal:    LABS        CAPILLARY BLOOD GLUCOSE      POCT Blood Glucose.: 133 mg/dL (16 Apr 2023 13:06)  POCT Blood Glucose.: 82 mg/dL (16 Apr 2023 09:57)  POCT Blood Glucose.: 112 mg/dL (16 Apr 2023 03:37)  POCT Blood Glucose.: 82 mg/dL (15 Apr 2023 22:19)  POCT Blood Glucose.: 71 mg/dL (15 Apr 2023 18:52)  POCT Blood Glucose.: 84 mg/dL (15 Apr 2023 17:24)   Interval Events:  Patient is a 14y old  Female who presents with a chief complaint of new onset diabetes and DKA    HPI:  15yo female, with no significant pmh, p/w 2 days of nausea, vomiting and diarrhea to OSH ED. Pt endorses 3 episodes of NBNB emesis and nonbloody, watery diarrhea with fever, Tmax 102F. Pt also endorses polyuria, polydipsia and polyphagia for 1 wk. Denies abdominal pain, fatigue, AMS, and weight loss.     ED Course: CBC, CMP, BMP, bHCG, lipase, a1c, poct glucose, vbg, UA/UCx, RVP/Covid, BCx. Tylenol x1, toradol x1, zofran x1. 1L NS bolus x2, D10NS + 20KCl. Endocrine consulted (13 Apr 2023 00:50)   Mattie was treated with an insulin drip and IV fluids and later transitioned to sub Q insulin. Sh fouworked with the floor team over the weekend nd to be positive to Rota virus and E.coli from a stool sample. She had few sessions of education with the nurses and nutritionist is she gradually improved with her diarrhea and intake and weaned off the IV fluids. Mattie and her mother worked with the floor team over the weekend.     We came to see her today and reviewed again calculations, management of hypoglycemia and hyperglycemia, and different possible scenarios.     We discussed with Mattie and her mother and explained the mechanism of diabetes, the differences between type 1 and type 2 diabetes, and that Mattie probably has type 1 diabetes based on her age and presentation, which requires life long insulin treatment.    Lorettaom a stool sample. She is eating well today. She had another session of education with nurses and nutritionist, and she worked with the bedside nurse and practice checking blood sugars, calculating the doses, and injections.      CAPILLARY BLOOD GLUCOSE      POCT Blood Glucose.: 133 mg/dL (16 Apr 2023 13:06)  POCT Blood Glucose.: 82 mg/dL (16 Apr 2023 09:57)  POCT Blood Glucose.: 112 mg/dL (16 Apr 2023 03:37)  POCT Blood Glucose.: 82 mg/dL (15 Apr 2023 22:19)  POCT Blood Glucose.: 71 mg/dL (15 Apr 2023 18:52)  POCT Blood Glucose.: 84 mg/dL (15 Apr 2023 17:24)        [] All review of systems performed and negative, unlisted commented here:    Allergies    No Known Allergies    Intolerances      Endocrine/Metabolic Medications:  insulin glargine SubCutaneous Injection (LANTUS) - Peds 11 Unit(s) SubCutaneous at bedtime      Vital Signs Last 24 Hrs  T(C): 36.8 (16 Apr 2023 10:02), Max: 36.9 (15 Apr 2023 22:00)  T(F): 98.2 (16 Apr 2023 10:02), Max: 98.4 (15 Apr 2023 22:00)  HR: 76 (16 Apr 2023 10:02) (74 - 95)  BP: 101/62 (16 Apr 2023 10:02) (94/54 - 108/70)  BP(mean): --  RR: 18 (16 Apr 2023 10:02) (18 - 18)  SpO2: 98% (16 Apr 2023 10:02) (97% - 98%)    Parameters below as of 16 Apr 2023 10:02  Patient On (Oxygen Delivery Method): room air          PHYSICAL EXAM  All physical exam findings normal, except those marked:  General:	Alert, active, cooperative, NAD, well hydrated  .		[] Abnormal:  Neck		Normal: supple, no cervical adenopathy, no palpable thyroid  .		[] Abnormal:  Cardiovascular	Normal: regular rate, normal S1, S2, no murmurs  .		[] Abnormal:  Respiratory	Normal: no chest wall deformity, normal respiratory pattern, CTA B/L  .		[] Abnormal:  Abdominal	Normal: soft, ND, NT, bowel sounds present, no masses, no organomegaly  .		[] Abnormal:  		Normal normal genitalia, testes descended, circumcised/uncircumcised  .		Eugenio stage:			Breast eugenio:  .		Menstrual history:  .		[] Abnormal:  Extremities	Normal: FROM x4  .		[] Abnormal:  Skin		Normal: intact and not indurated, no rash, no acanthosis nigricans  .		[] Abnormal:  Neurologic	Normal: grossly intact  .		[] Abnormal:    LABS        CAPILLARY BLOOD GLUCOSE      POCT Blood Glucose.: 133 mg/dL (16 Apr 2023 13:06)  POCT Blood Glucose.: 82 mg/dL (16 Apr 2023 09:57)  POCT Blood Glucose.: 112 mg/dL (16 Apr 2023 03:37)  POCT Blood Glucose.: 82 mg/dL (15 Apr 2023 22:19)  POCT Blood Glucose.: 71 mg/dL (15 Apr 2023 18:52)  POCT Blood Glucose.: 84 mg/dL (15 Apr 2023 17:24)   Patient is a 14y old  Female who presents with a chief complaint of new onset diabetes and DKA    HPI:  15yo female, with no significant pmh, p/w 2 days of nausea, vomiting and diarrhea to OSH ED. Pt endorses 3 episodes of NBNB emesis and nonbloody, watery diarrhea with fever, Tmax 102F. Pt also endorses polyuria, polydipsia and polyphagia for 1 wk. Denies abdominal pain, fatigue, AMS, and weight loss.     ED Course: CBC, CMP, BMP, bHCG, lipase, a1c, poct glucose, vbg, UA/UCx, RVP/Covid, BCx. Tylenol x1, toradol x1, zofran x1. 1L NS bolus x2, D10NS + 20KCl. Endocrine consulted (13 Apr 2023 00:50)    Interval Events:  Mattie was treated with an insulin drip and IV fluids and later transitioned to sub Q insulin. She found to be positive to Rota virus and E.coli from a stool sample. She had few sessions of education with the nurses and nutritionist is she gradually improved with her diarrhea and intake and weaned off the IV fluids. Mattie and her mother worked with the floor team over the weekend and practice diabetes management.     We came to see her today and reviewed again calculations, management of hypoglycemia and hyperglycemia, and different possible scenarios. Mattie and her mother feel comfortable to be discharged.    [] All review of systems performed and negative, unlisted commented here:    Allergies  No Known Allergies    Endocrine/Metabolic Medications:  insulin glargine SubCutaneous Injection (LANTUS) - Peds 11 Unit(s) SubCutaneous at bedtime      Vital Signs Last 24 Hrs  T(C): 36.8 (16 Apr 2023 10:02), Max: 36.9 (15 Apr 2023 22:00)  T(F): 98.2 (16 Apr 2023 10:02), Max: 98.4 (15 Apr 2023 22:00)  HR: 76 (16 Apr 2023 10:02) (74 - 95)  BP: 101/62 (16 Apr 2023 10:02) (94/54 - 108/70)  BP(mean): --  RR: 18 (16 Apr 2023 10:02) (18 - 18)  SpO2: 98% (16 Apr 2023 10:02) (97% - 98%)    Parameters below as of 16 Apr 2023 10:02  Patient On (Oxygen Delivery Method): room air    PHYSICAL EXAM  All physical exam findings normal, except those marked:  General:	Alert, active, cooperative, NAD, well hydrated  .		[] Abnormal:    CAPILLARY BLOOD GLUCOSE  POCT Blood Glucose.: 133 mg/dL (16 Apr 2023 13:06)  POCT Blood Glucose.: 82 mg/dL (16 Apr 2023 09:57)  POCT Blood Glucose.: 112 mg/dL (16 Apr 2023 03:37)  POCT Blood Glucose.: 82 mg/dL (15 Apr 2023 22:19)  POCT Blood Glucose.: 71 mg/dL (15 Apr 2023 18:52)  POCT Blood Glucose.: 84 mg/dL (15 Apr 2023 17:24) Patient is a 14y old  Female who presents with a chief complaint of new onset diabetes and DKA    HPI:  13yo female, with no significant pmh, p/w 2 days of nausea, vomiting and diarrhea to OSH ED. Pt endorses 3 episodes of NBNB emesis and nonbloody, watery diarrhea with fever, Tmax 102F. Pt also endorses polyuria, polydipsia and polyphagia for 1 wk. Denies abdominal pain, fatigue, AMS, and weight loss.     ED Course: CBC, CMP, BMP, bHCG, lipase, a1c, poct glucose, vbg, UA/UCx, RVP/Covid, BCx. Tylenol x1, toradol x1, zofran x1. 1L NS bolus x2, D10NS + 20KCl. Endocrine consulted (13 Apr 2023 00:50)    Interval Events:  Mattie was treated with an insulin drip and IV fluids and later transitioned to sub Q insulin. She found to be positive to Rota virus and E.coli from a stool sample. She had few sessions of education with the nurses and nutritionist is she gradually improved with her diarrhea and intake and weaned off the IV fluids. Mattie and her mother worked with the floor team over the weekend and practice diabetes management.     We came to see her today and reviewed again calculations, management of hypoglycemia and hyperglycemia, and different possible scenarios. Mattie and her mother feel comfortable to be discharged.    [] All review of systems performed and negative, unlisted commented here:    Allergies  No Known Allergies    Endocrine/Metabolic Medications:  insulin glargine SubCutaneous Injection (LANTUS) - Peds 11 Unit(s) SubCutaneous at bedtime      Vital Signs Last 24 Hrs  T(C): 36.8 (16 Apr 2023 10:02), Max: 36.9 (15 Apr 2023 22:00)  T(F): 98.2 (16 Apr 2023 10:02), Max: 98.4 (15 Apr 2023 22:00)  HR: 76 (16 Apr 2023 10:02) (74 - 95)  BP: 101/62 (16 Apr 2023 10:02) (94/54 - 108/70)  BP(mean): --  RR: 18 (16 Apr 2023 10:02) (18 - 18)  SpO2: 98% (16 Apr 2023 10:02) (97% - 98%)    Parameters below as of 16 Apr 2023 10:02  Patient On (Oxygen Delivery Method): room air    PHYSICAL EXAM  All physical exam findings normal, except those marked:  General:	Alert, active, cooperative, NAD, well hydrated  .		[] Abnormal:    CAPILLARY BLOOD GLUCOSE  POCT Blood Glucose.: 133 mg/dL (16 Apr 2023 13:06)  POCT Blood Glucose.: 82 mg/dL (16 Apr 2023 09:57)  POCT Blood Glucose.: 112 mg/dL (16 Apr 2023 03:37)  POCT Blood Glucose.: 82 mg/dL (15 Apr 2023 22:19)  POCT Blood Glucose.: 71 mg/dL (15 Apr 2023 18:52)  POCT Blood Glucose.: 84 mg/dL (15 Apr 2023 17:24) Never smoker

## 2023-04-21 ENCOUNTER — APPOINTMENT (OUTPATIENT)
Dept: PULMONOLOGY | Facility: CLINIC | Age: 83
End: 2023-04-21
Payer: MEDICARE

## 2023-04-21 VITALS
SYSTOLIC BLOOD PRESSURE: 111 MMHG | BODY MASS INDEX: 23.04 KG/M2 | HEART RATE: 65 BPM | TEMPERATURE: 97.1 F | DIASTOLIC BLOOD PRESSURE: 74 MMHG | OXYGEN SATURATION: 98 % | HEIGHT: 68 IN | WEIGHT: 152 LBS

## 2023-04-21 PROCEDURE — 99213 OFFICE O/P EST LOW 20 MIN: CPT

## 2023-04-21 NOTE — ASSESSMENT
[FreeTextEntry1] : 83y/o male never smoker from [place]\par \par 1-   reactive airways/ asthma with  aspiration of  liquids when drinking fast \par 2-  h/o recurrent pneumonia ct 2/2023     with possible GERD  aspiration\par 3-  recurrent  UTI /  prostate  \par 4- deconditioning\par 5 - left neck fullness vaccinations \par \par Recommendations\par 1- add  budesonide q 12\par 2-  add  performist q 12\par 3-   June 1/2023 ct \par 4-ENT    / neck ct\par 5-   taught to drink sips only \par 6- PFT\par \par \par Seppi    [phone]\par \par return in  2-3 months

## 2023-04-21 NOTE — PHYSICAL EXAM
[Enlarged Base of the Tongue] : enlarged base of the tongue [III] : Mallampati Class: III [Supple] : supple [No Neck Mass] : no neck mass [No JVD] : no jvd [Normal S1, S2] : normal s1, s2 [Clear to Auscultation Bilaterally] : clear to auscultation bilaterally [Benign] : benign [No Masses] : no masses [Soft] : soft [Normal Bowel Sounds] : normal bowel sounds [Normal Gait] : normal gait [No Clubbing] : no clubbing [No Edema] : no edema [No Rash] : no rash [No Motor Deficits] : no motor deficits [Normal Affect] : normal affect [TextBox_2] : pleasant male no distress speaking full sentences no cough  [TextBox_11] : no lesions    + left cervical   fullness

## 2023-04-21 NOTE — HISTORY OF PRESENT ILLNESS
[Never] : never [TextBox_4] : 81y/o   male   born in [place]     never smoker         h/o  UTI    sepsis   2021 intubation 3-4 days -  here for wheezing OOH pneumonia\par \par Discharge Date	07-Jan-2023\par Admission Date	05-Jan-2023 00:04\par Reason for Admission	AMS x 2 days\par Hospital Course	\par HPI:\par History obtained from Patient and Patients Niece\par Patient currently AAO x 3\par  \par  \par 82 M with pmhx BPH, HTN brought in by family for cold, chills. Patient\par complains of some difficulty breathing but denies a cough.\par No known fever.  No vomiting or diarrhea.  No urinary symptoms.\par  \par In the ed UA was positive patient received 1 dose of Ceftriaxone  (05 Jan 2023\par 10:26)\par Stared patient on Ceftriaxone\par CT abdomen shows pleural effusions possible Pneumonia\par Added Zithromax today\par Blood, Urine cx no growth\par  \par D/C planning on Vantin\par D/C planning today\par  \par  \par  \par Med Reconciliation:\par Medication Reconciliation Status	Admission Reconciliation is Not Complete\par Discharge \par \par -baseline walks with cane\par - wheezing intermittently \par -recurrent    infection and antibiotics \par \par 4/21/2023\par -f/u feeling much better with nebulizer\par - swallow  aspiration with large   sips +   c/w \par -

## 2023-05-05 ENCOUNTER — APPOINTMENT (OUTPATIENT)
Dept: CT IMAGING | Facility: CLINIC | Age: 83
End: 2023-05-05
Payer: MEDICARE

## 2023-05-05 ENCOUNTER — APPOINTMENT (OUTPATIENT)
Dept: OTOLARYNGOLOGY | Facility: CLINIC | Age: 83
End: 2023-05-05
Payer: MEDICARE

## 2023-05-05 ENCOUNTER — OUTPATIENT (OUTPATIENT)
Dept: OUTPATIENT SERVICES | Facility: HOSPITAL | Age: 83
LOS: 1 days | End: 2023-05-05
Payer: MEDICARE

## 2023-05-05 VITALS
BODY MASS INDEX: 23.04 KG/M2 | OXYGEN SATURATION: 97 % | DIASTOLIC BLOOD PRESSURE: 70 MMHG | HEART RATE: 63 BPM | WEIGHT: 152 LBS | HEIGHT: 68 IN | TEMPERATURE: 97.3 F | SYSTOLIC BLOOD PRESSURE: 110 MMHG

## 2023-05-05 DIAGNOSIS — R22.1 LOCALIZED SWELLING, MASS AND LUMP, NECK: ICD-10-CM

## 2023-05-05 DIAGNOSIS — Z98.890 OTHER SPECIFIED POSTPROCEDURAL STATES: Chronic | ICD-10-CM

## 2023-05-05 DIAGNOSIS — R93.89 ABNORMAL FINDINGS ON DIAGNOSTIC IMAGING OF OTHER SPECIFIED BODY STRUCTURES: ICD-10-CM

## 2023-05-05 PROCEDURE — 70490 CT SOFT TISSUE NECK W/O DYE: CPT | Mod: 26

## 2023-05-05 PROCEDURE — 70490 CT SOFT TISSUE NECK W/O DYE: CPT

## 2023-05-05 PROCEDURE — 71250 CT THORAX DX C-: CPT | Mod: 26

## 2023-05-05 PROCEDURE — 99213 OFFICE O/P EST LOW 20 MIN: CPT | Mod: 25

## 2023-05-05 PROCEDURE — 31231 NASAL ENDOSCOPY DX: CPT

## 2023-05-05 PROCEDURE — 71250 CT THORAX DX C-: CPT

## 2023-05-05 RX ORDER — FORMOTEROL FUMARATE DIHYDRATE 0.02 MG/2ML
20 SOLUTION RESPIRATORY (INHALATION)
Qty: 180 | Refills: 3 | Status: DISCONTINUED | COMMUNITY
Start: 2023-03-24 | End: 2023-05-05

## 2023-05-05 NOTE — HISTORY OF PRESENT ILLNESS
[Neck Mass] : neck mass [de-identified] : Mr. NAVARRETE is a 83 year male who presents for evaluation of left neck fullness\par Referred by Dr. Ramirez who ordered neck CT-- scheduled for today\par Some weight loss in last few years but non acutely\par Denies dysphagia, dysphonia, dyspnea\par Daughter says this neck finding is longstanding\par No recent change\par  [Difficulty Swallowing] : no difficulty swallowing [Painful Swallowing] : no painful swallowing

## 2023-05-05 NOTE — REVIEW OF SYSTEMS
[As Noted in HPI] : as noted in HPI [Swelling Neck] : swelling neck [Negative] : Heme/Lymph [FreeTextEntry4] : left neck fullness.

## 2023-05-10 ENCOUNTER — NON-APPOINTMENT (OUTPATIENT)
Age: 83
End: 2023-05-10

## 2023-05-15 ENCOUNTER — NON-APPOINTMENT (OUTPATIENT)
Age: 83
End: 2023-05-15

## 2023-06-02 ENCOUNTER — APPOINTMENT (OUTPATIENT)
Dept: CT IMAGING | Facility: CLINIC | Age: 83
End: 2023-06-02

## 2023-06-23 ENCOUNTER — APPOINTMENT (OUTPATIENT)
Dept: PULMONOLOGY | Facility: CLINIC | Age: 83
End: 2023-06-23
Payer: MEDICARE

## 2023-06-23 VITALS
OXYGEN SATURATION: 97 % | HEIGHT: 68 IN | SYSTOLIC BLOOD PRESSURE: 110 MMHG | DIASTOLIC BLOOD PRESSURE: 70 MMHG | WEIGHT: 152 LBS | BODY MASS INDEX: 23.04 KG/M2 | HEART RATE: 61 BPM | TEMPERATURE: 70 F

## 2023-06-23 DIAGNOSIS — J69.0 PNEUMONITIS DUE TO INHALATION OF FOOD AND VOMIT: ICD-10-CM

## 2023-06-23 DIAGNOSIS — R93.89 ABNORMAL FINDINGS ON DIAGNOSTIC IMAGING OF OTHER SPECIFIED BODY STRUCTURES: ICD-10-CM

## 2023-06-23 PROCEDURE — 99213 OFFICE O/P EST LOW 20 MIN: CPT

## 2023-06-23 RX ORDER — ALBUTEROL SULFATE 2.5 MG/3ML
(2.5 MG/3ML) SOLUTION RESPIRATORY (INHALATION)
Qty: 1 | Refills: 3 | Status: ACTIVE | COMMUNITY
Start: 2023-03-20 | End: 1900-01-01

## 2023-06-23 RX ORDER — BUDESONIDE 0.5 MG/2ML
0.5 INHALANT ORAL
Qty: 360 | Refills: 3 | Status: ACTIVE | COMMUNITY
Start: 2023-03-24 | End: 1900-01-01

## 2023-06-23 NOTE — REVIEW OF SYSTEMS
[SOB on Exertion] : sob on exertion [Fever] : no fever [Recent Wt Gain (___ Lbs)] : ~T no recent weight gain [Chills] : no chills [Recent Wt Loss (___ Lbs)] : ~T no recent weight loss [Dry Eyes] : no dry eyes [Cough] : no cough [Hemoptysis] : no hemoptysis [Chest Tightness] : no chest tightness [Sputum] : no sputum [Dyspnea] : no dyspnea [Wheezing] : no wheezing [GERD] : no gerd [Abdominal Pain] : no abdominal pain [Nausea] : no nausea [Vomiting] : no vomiting [Dysphagia] : no dysphagia [Bleeding] : no bleeding [Myalgias] : no myalgias [Blood Transfusion] : no blood transfusion [Clotting Disorder/ Frequent bleeding] : no clotting disorder/ frequent bleeding [Diabetes] : no diabetes [Thyroid Problem] : no thyroid problem [Obesity] : no obesity [TextBox_91] : cane    bc   blind one eye       glaucoma  and    balance issues

## 2023-06-23 NOTE — PHYSICAL EXAM
[No Acute Distress] : no acute distress [No Deformities] : no deformities [IV] : Mallampati Class: IV [Normal Appearance] : normal appearance [Supple] : supple [No Neck Mass] : no neck mass [No JVD] : no jvd [Normal Rate/Rhythm] : normal rate/rhythm [Normal S1, S2] : normal s1, s2 [No Resp Distress] : no resp distress [Clear to Auscultation Bilaterally] : clear to auscultation bilaterally [Kyphosis] : kyphosis [No Masses] : no masses [Soft] : soft [No Hernias] : no hernias [Normal Bowel Sounds] : normal bowel sounds [No Clubbing] : no clubbing [No Edema] : no edema [No Rash] : no rash [No Motor Deficits] : no motor deficits [Benign] : benign [Normal Gait] : normal gait [TextBox_2] : pleasant male no distress quiet  [TextBox_11] : crowded no lesion  [TextBox_140] : quiet

## 2023-06-23 NOTE — ASSESSMENT
[FreeTextEntry1] : 82y/o male never smoker from [place]\par \par 1-   asthma     trigger  dysphagia - +  aspiration of  liquids when drinking fast \par 2-   ct 5/2023  stable nodule since 2021   resolved LLL pneumonia\par 3-  ct with esophagus dilation -possible GERD  aspiration\par 4- deconditioning with recurrent  UTI /  prostate  \par 5 -  vaccinations  per primary \par \par Recommendations\par 1-  budesonide q 12\par 2-  albuterol Neb prn \par 3- sips and  dysphagia   diet \par 4- PFT\par \par \par Seppi    [phone]\par \par return in  4-6months

## 2023-06-23 NOTE — HISTORY OF PRESENT ILLNESS
[Never] : never [TextBox_4] : 81y/o   male   born in [place]     never smoker         h/o  UTI    sepsis   2021 intubation 3-4 days -  here for wheezing OOH pneumonia\par \par Discharge Date	07-Jan-2023\par Admission Date	05-Jan-2023 00:04\par Reason for Admission	AMS x 2 days\par Hospital Course	\par HPI:\par History obtained from Patient and Patients Niece\par Patient currently AAO x 3\par  \par  \par 82 M with pmhx BPH, HTN brought in by family for cold, chills. Patient\par complains of some difficulty breathing but denies a cough.\par No known fever.  No vomiting or diarrhea.  No urinary symptoms.\par  \par In the ed UA was positive patient received 1 dose of Ceftriaxone  (05 Jan 2023\par 10:26)\par Stared patient on Ceftriaxone\par CT abdomen shows pleural effusions possible Pneumonia\par Added Zithromax today\par Blood, Urine cx no growth\par  \par D/C planning on Vantin\par D/C planning today\par  \par  \par  \par Med Reconciliation:\par Medication Reconciliation Status	Admission Reconciliation is Not Complete\par Discharge \par \par -baseline walks with cane\par - wheezing intermittently \par -recurrent    infection and antibiotics \par \par 4/21/2023\par -f/u feeling much better with nebulizer\par - swallow  aspiration with large   sips +   c/w \par \par 6/23/2023\par 82y/o  male never smoker born in [place]   h/o pneumonia  ? aspiration  f/u      asthma ct\par - improved ct chest \par - neck ct no masses\par - on budesonide q 12 and prn  duonebs \par -doing well per daughter improved overall  \par -no cough \par - no n/v/\par -

## 2023-06-23 NOTE — REASON FOR VISIT
[Follow-Up] : a follow-up visit [Abnormal CXR/ Chest CT] : an abnormal CXR/ chest CT [Asthma] : asthma [Cough] : cough [Family Member] : family member

## 2023-06-26 ENCOUNTER — APPOINTMENT (OUTPATIENT)
Dept: FAMILY MEDICINE | Facility: CLINIC | Age: 83
End: 2023-06-26

## 2023-09-18 ENCOUNTER — APPOINTMENT (OUTPATIENT)
Dept: FAMILY MEDICINE | Facility: CLINIC | Age: 83
End: 2023-09-18
Payer: MEDICARE

## 2023-09-18 VITALS
BODY MASS INDEX: 23.49 KG/M2 | WEIGHT: 155 LBS | RESPIRATION RATE: 18 BRPM | HEIGHT: 68 IN | SYSTOLIC BLOOD PRESSURE: 110 MMHG | OXYGEN SATURATION: 98 % | DIASTOLIC BLOOD PRESSURE: 70 MMHG | HEART RATE: 59 BPM | TEMPERATURE: 97.7 F

## 2023-09-18 DIAGNOSIS — Z00.00 ENCOUNTER FOR GENERAL ADULT MEDICAL EXAMINATION W/OUT ABNORMAL FINDINGS: ICD-10-CM

## 2023-09-18 DIAGNOSIS — Z13.21 ENCOUNTER FOR SCREENING FOR NUTRITIONAL DISORDER: ICD-10-CM

## 2023-09-18 DIAGNOSIS — Z12.5 ENCOUNTER FOR SCREENING FOR MALIGNANT NEOPLASM OF PROSTATE: ICD-10-CM

## 2023-09-18 PROCEDURE — G0008: CPT

## 2023-09-18 PROCEDURE — G0439: CPT

## 2023-09-18 PROCEDURE — 90662 IIV NO PRSV INCREASED AG IM: CPT

## 2023-09-18 RX ORDER — BENZONATATE 100 MG/1
100 CAPSULE ORAL TWICE DAILY
Qty: 28 | Refills: 1 | Status: COMPLETED | COMMUNITY
Start: 2023-03-20 | End: 2023-09-18

## 2023-09-18 RX ORDER — METHYLPREDNISOLONE 4 MG/1
4 TABLET ORAL
Qty: 15 | Refills: 0 | Status: COMPLETED | COMMUNITY
Start: 2023-08-25

## 2023-09-18 RX ORDER — HYDROCHLOROTHIAZIDE 25 MG/1
25 TABLET ORAL
Refills: 0 | Status: COMPLETED | COMMUNITY
End: 2023-09-18

## 2023-09-18 RX ORDER — CLOTRIMAZOLE AND BETAMETHASONE DIPROPIONATE 10; .5 MG/G; MG/G
1-0.05 CREAM TOPICAL
Qty: 60 | Refills: 0 | Status: ACTIVE | COMMUNITY
Start: 2023-08-25

## 2023-09-18 RX ORDER — ALLOPURINOL 300 MG/1
300 TABLET ORAL DAILY
Qty: 90 | Refills: 3 | Status: COMPLETED | COMMUNITY
Start: 2021-10-11 | End: 2023-09-18

## 2023-09-18 RX ORDER — SILDENAFIL 100 MG/1
100 TABLET, FILM COATED ORAL
Qty: 6 | Refills: 0 | Status: COMPLETED | COMMUNITY
Start: 2022-08-19

## 2023-09-18 RX ORDER — PIROXICAM 20 MG/1
20 CAPSULE ORAL
Qty: 90 | Refills: 3 | Status: COMPLETED | COMMUNITY
Start: 2023-01-09 | End: 2023-09-18

## 2023-09-25 LAB
25(OH)D3 SERPL-MCNC: 44.1 NG/ML
ALBUMIN SERPL ELPH-MCNC: 4.3 G/DL
ALP BLD-CCNC: 66 U/L
ALT SERPL-CCNC: 8 U/L
ANION GAP SERPL CALC-SCNC: 11 MMOL/L
AST SERPL-CCNC: 9 U/L
BILIRUB SERPL-MCNC: 0.6 MG/DL
BUN SERPL-MCNC: 25 MG/DL
CALCIUM SERPL-MCNC: 9.2 MG/DL
CHLORIDE SERPL-SCNC: 102 MMOL/L
CHOLEST SERPL-MCNC: 171 MG/DL
CO2 SERPL-SCNC: 27 MMOL/L
CREAT SERPL-MCNC: 1.23 MG/DL
EGFR: 58 ML/MIN/1.73M2
GLUCOSE SERPL-MCNC: 92 MG/DL
HDLC SERPL-MCNC: 56 MG/DL
LDLC SERPL CALC-MCNC: 102 MG/DL
NONHDLC SERPL-MCNC: 116 MG/DL
POTASSIUM SERPL-SCNC: 3.7 MMOL/L
PROT SERPL-MCNC: 6.6 G/DL
PSA FREE FLD-MCNC: 15 %
PSA FREE SERPL-MCNC: 0.61 NG/ML
PSA SERPL-MCNC: 4.09 NG/ML
SODIUM SERPL-SCNC: 140 MMOL/L
TRIGL SERPL-MCNC: 70 MG/DL

## 2023-10-03 ENCOUNTER — RX RENEWAL (OUTPATIENT)
Age: 83
End: 2023-10-03

## 2023-10-23 ENCOUNTER — APPOINTMENT (OUTPATIENT)
Dept: FAMILY MEDICINE | Facility: CLINIC | Age: 83
End: 2023-10-23
Payer: MEDICARE

## 2023-10-23 PROCEDURE — G0009: CPT

## 2023-10-23 PROCEDURE — 90677 PCV20 VACCINE IM: CPT

## 2023-11-03 ENCOUNTER — APPOINTMENT (OUTPATIENT)
Dept: PULMONOLOGY | Facility: CLINIC | Age: 83
End: 2023-11-03
Payer: MEDICARE

## 2023-11-03 VITALS
HEIGHT: 68 IN | WEIGHT: 156 LBS | BODY MASS INDEX: 23.64 KG/M2 | TEMPERATURE: 97.5 F | RESPIRATION RATE: 18 BRPM | SYSTOLIC BLOOD PRESSURE: 118 MMHG | DIASTOLIC BLOOD PRESSURE: 72 MMHG | HEART RATE: 75 BPM | OXYGEN SATURATION: 95 %

## 2023-11-03 DIAGNOSIS — J45.909 UNSPECIFIED ASTHMA, UNCOMPLICATED: ICD-10-CM

## 2023-11-03 PROCEDURE — 99213 OFFICE O/P EST LOW 20 MIN: CPT

## 2023-12-31 ENCOUNTER — RX RENEWAL (OUTPATIENT)
Age: 83
End: 2023-12-31

## 2024-01-12 ENCOUNTER — APPOINTMENT (OUTPATIENT)
Dept: FAMILY MEDICINE | Facility: CLINIC | Age: 84
End: 2024-01-12
Payer: MEDICARE

## 2024-01-12 VITALS
HEIGHT: 68 IN | BODY MASS INDEX: 23.49 KG/M2 | SYSTOLIC BLOOD PRESSURE: 110 MMHG | RESPIRATION RATE: 12 BRPM | HEART RATE: 68 BPM | TEMPERATURE: 97 F | WEIGHT: 155 LBS | DIASTOLIC BLOOD PRESSURE: 68 MMHG | OXYGEN SATURATION: 97 %

## 2024-01-12 DIAGNOSIS — R09.82 POSTNASAL DRIP: ICD-10-CM

## 2024-01-12 PROCEDURE — 99214 OFFICE O/P EST MOD 30 MIN: CPT

## 2024-01-12 RX ORDER — LOSARTAN POTASSIUM AND HYDROCHLOROTHIAZIDE 12.5; 5 MG/1; MG/1
50-12.5 TABLET ORAL
Qty: 30 | Refills: 0 | Status: DISCONTINUED | COMMUNITY
Start: 2023-08-25 | End: 2024-01-12

## 2024-01-12 RX ORDER — AZELASTINE HYDROCHLORIDE 137 UG/1
137 SPRAY, METERED NASAL
Qty: 1 | Refills: 3 | Status: ACTIVE | COMMUNITY
Start: 2024-01-12 | End: 1900-01-01

## 2024-01-12 NOTE — ASSESSMENT
[FreeTextEntry1] : # Elevated PSA Repeat PSA level Referred to urology  # HTN Pt has been off BP meds for a few days, having low BP readings without medication Renewed losartan 50, but recommended hold it for now, check BP at home and if consistently low BP off medication can stay off losartan Discuss medication changes with cardio as well - pt's daughter wasn't able to explain why he sees cardiologist every few months, but denies hx of MI, heart failure, etc  f/u PRN

## 2024-01-12 NOTE — HISTORY OF PRESENT ILLNESS
[Family Member] : family member [FreeTextEntry1] : f/u prostate levels [de-identified] : Pt is accompanied by his daughter who provided most of following information and translated for patient. Pt comes in to have prostate levels checked. Pt denies urinary issues. Needs urology referral to someone in network.  HTN: has been taking losartan (no HCTZ component) until recently when he ran out

## 2024-01-12 NOTE — PHYSICAL EXAM
[Normal Sclera/Conjunctiva] : normal sclera/conjunctiva [Normal Outer Ear/Nose] : the outer ears and nose were normal in appearance [Normal] : normal rate, regular rhythm, normal S1 and S2 and no murmur heard [No Edema] : there was no peripheral edema [Normal Affect] : the affect was normal [Alert and Oriented x3] : oriented to person, place, and time [Normal Insight/Judgement] : insight and judgment were intact

## 2024-01-22 LAB — PSA SERPL-MCNC: 5.55 NG/ML

## 2024-02-05 ENCOUNTER — RX RENEWAL (OUTPATIENT)
Age: 84
End: 2024-02-05

## 2024-02-05 RX ORDER — FLUTICASONE PROPIONATE 50 UG/1
50 SPRAY, METERED NASAL
Qty: 16 | Refills: 0 | Status: ACTIVE | COMMUNITY
Start: 2024-01-12 | End: 1900-01-01

## 2024-02-16 ENCOUNTER — APPOINTMENT (OUTPATIENT)
Dept: UROLOGY | Facility: CLINIC | Age: 84
End: 2024-02-16

## 2024-02-16 ENCOUNTER — APPOINTMENT (OUTPATIENT)
Dept: UROLOGY | Facility: CLINIC | Age: 84
End: 2024-02-16
Payer: MEDICARE

## 2024-02-16 VITALS
BODY MASS INDEX: 25.01 KG/M2 | SYSTOLIC BLOOD PRESSURE: 162 MMHG | HEIGHT: 68 IN | HEART RATE: 76 BPM | DIASTOLIC BLOOD PRESSURE: 96 MMHG | WEIGHT: 165 LBS

## 2024-02-16 DIAGNOSIS — Z87.440 PERSONAL HISTORY OF URINARY (TRACT) INFECTIONS: ICD-10-CM

## 2024-02-16 DIAGNOSIS — Z78.9 OTHER SPECIFIED HEALTH STATUS: ICD-10-CM

## 2024-02-16 PROCEDURE — 99214 OFFICE O/P EST MOD 30 MIN: CPT

## 2024-02-16 NOTE — ASSESSMENT
[FreeTextEntry1] : Mr. NAVARRETE is a 83 year-year-old    M who comes today to clinic for elevated PSA to 5.55<4.09 ng/mL referred by Dr. Forman.   The patient understands that PSA is a marker of cancer risk but does not diagnose cancer. He also understands that there are a number of benign conditions including UTI, BPH, certain activities and prostatitis that will increase PSA in the absence of cancer. Unfortunately, the only way to definitively diagnose cancer is with a prostate biopsy. We discussed the method of performing prostate and the risks (bleeding, infection, urinary retention, ED). In addition to this, the patient and I discussed the discrepancy between the prevalence of prostate cancer and the prevalence of death from prostate cancer.  Prostate cancer is the most common solid tumor in American men. However, we discussed that it can be very slow growing, many men with prostate cancer will die with the disease rather than from it.   I gave the patient the following options: 1. Do nothing. This could miss an undiagnosed prostate cancer which may have little effect or significant effects at a later date. 2. Recheck his PSA in 3-6 months. We would then regroup and have this discussion about his options again. 3. Perform a prostate biopsy: I explained how a prostate biopsy is performed and the risks including bleeding and infection and difficulty voiding. 4. Perform a Prostate mpMRI to evaluate the prostate for any suspicious lesions. The results will determine if he needs to have a targeted prostate biopsy.   Based on our discussion the patient decided to proceed with mpMRI of prostate.

## 2024-02-16 NOTE — SIGNATURES
[TextEntry] : Janak Luther M.D. Minimally Invasive and Robotic Urologic Surgery [facility] | [facility]  of Urology [employer] at [facility] Tel: [phone] | Fax: [phone] | email: [email or web address]

## 2024-02-16 NOTE — HEALTH RISK ASSESSMENT
Pt with cold like symptoms x 3 days.     Tactile fevers at home,     Ibuprofen at 1800   [Fair] : ~his/her~ current health as fair  [Good] : ~his/her~  mood as  good [Monthly or less (1 pt)] : Monthly or less (1 point) [1 or 2 (0 pts)] : 1 or 2 (0 points) [No] : In the past 12 months have you used drugs other than those required for medical reasons? No [No falls in past year] : Patient reported no falls in the past year [0] : 2) Feeling down, depressed, or hopeless: Not at all (0) [With Family] : lives with family [Retired] : retired [] :  [# Of Children ___] : has [unfilled] children [Independent] : feeding [Some assistance needed] : managing medications [Full assistance needed] : managing finances [Reports changes in vision] : Reports changes in vision [Designated Healthcare Proxy] : Designated healthcare proxy [Name: ___] : Health Care Proxy's Name: [unfilled]  [Relationship: ___] : Relationship: [unfilled] [Aggressive treatment] : aggressive treatment [I will adhere to the patient's wishes as expressed in the advance directive except as noted below.] : I will adhere to the patient's wishes as expressed in the advance directive except as noted below [FreeTextEntry1] : insomnia; weakness [] : No [Audit-CScore] : 1 [de-identified] : walking [de-identified] : Alex [[facility]] : 20 [[id number]] : 0 [Reports changes in hearing] : Reports no changes in hearing [Reports changes in dental health] : Reports no changes in dental health [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

## 2024-02-21 ENCOUNTER — RESULT REVIEW (OUTPATIENT)
Age: 84
End: 2024-02-21

## 2024-02-21 ENCOUNTER — APPOINTMENT (OUTPATIENT)
Dept: MRI IMAGING | Facility: CLINIC | Age: 84
End: 2024-02-21
Payer: MEDICARE

## 2024-02-21 ENCOUNTER — OUTPATIENT (OUTPATIENT)
Dept: OUTPATIENT SERVICES | Facility: HOSPITAL | Age: 84
LOS: 1 days | End: 2024-02-21
Payer: MEDICARE

## 2024-02-21 DIAGNOSIS — R97.20 ELEVATED PROSTATE SPECIFIC ANTIGEN [PSA]: ICD-10-CM

## 2024-02-21 DIAGNOSIS — Z98.890 OTHER SPECIFIED POSTPROCEDURAL STATES: Chronic | ICD-10-CM

## 2024-02-21 LAB
APPEARANCE: ABNORMAL
BACTERIA UR CULT: NORMAL
BACTERIA: NEGATIVE /HPF
BILIRUBIN URINE: NEGATIVE
BLOOD URINE: ABNORMAL
CAST: 1 /LPF
COLOR: YELLOW
EPITHELIAL CELLS: 2 /HPF
GLUCOSE QUALITATIVE U: NEGATIVE MG/DL
KETONES URINE: NEGATIVE MG/DL
LEUKOCYTE ESTERASE URINE: ABNORMAL
MICROSCOPIC-UA: NORMAL
NITRITE URINE: NEGATIVE
PH URINE: 6
PROTEIN URINE: 30 MG/DL
RED BLOOD CELLS URINE: 3 /HPF
REVIEW: NORMAL
SPECIFIC GRAVITY URINE: 1.01
UROBILINOGEN URINE: 0.2 MG/DL
WBC CLUMPS: PRESENT
WHITE BLOOD CELLS URINE: 529 /HPF
YEAST-LIKE CELLS: PRESENT

## 2024-02-21 PROCEDURE — A9585: CPT

## 2024-02-21 PROCEDURE — 72197 MRI PELVIS W/O & W/DYE: CPT | Mod: 26

## 2024-02-21 PROCEDURE — 72197 MRI PELVIS W/O & W/DYE: CPT

## 2024-02-21 PROCEDURE — 76498P: CUSTOM | Mod: 26

## 2024-02-21 PROCEDURE — 76498 UNLISTED MR PROCEDURE: CPT

## 2024-02-29 ENCOUNTER — NON-APPOINTMENT (OUTPATIENT)
Age: 84
End: 2024-02-29

## 2024-03-11 ENCOUNTER — APPOINTMENT (OUTPATIENT)
Dept: UROLOGY | Facility: CLINIC | Age: 84
End: 2024-03-11
Payer: MEDICARE

## 2024-03-11 VITALS
SYSTOLIC BLOOD PRESSURE: 150 MMHG | DIASTOLIC BLOOD PRESSURE: 86 MMHG | BODY MASS INDEX: 25.01 KG/M2 | TEMPERATURE: 97.9 F | WEIGHT: 165 LBS | HEART RATE: 76 BPM | HEIGHT: 68 IN | OXYGEN SATURATION: 96 %

## 2024-03-11 PROCEDURE — 99215 OFFICE O/P EST HI 40 MIN: CPT

## 2024-03-11 RX ORDER — NEBULIZER ACCESSORIES
KIT MISCELLANEOUS
Qty: 1 | Refills: 0 | Status: DISCONTINUED | COMMUNITY
Start: 2023-03-20 | End: 2024-03-11

## 2024-03-11 RX ORDER — FAMOTIDINE 20 MG/1
20 TABLET, FILM COATED ORAL
Qty: 90 | Refills: 0 | Status: DISCONTINUED | COMMUNITY
Start: 2023-03-24 | End: 2024-03-11

## 2024-03-11 NOTE — REVIEW OF SYSTEMS
[see HPI] : see HPI [Wake up at night to urinate  How many times?  ___] : wakes up to urinate [unfilled] times during the night [Negative] : Genitourinary

## 2024-03-11 NOTE — ASSESSMENT
[FreeTextEntry1] :  83-year-old male with an elevated PSA a PI-RADS 5 lesion at the right base to apex peripheral zone here for consult of biopsy.   - MRI images have been reviewed and clinical implications discussed with the patient. - PSA hx reviewed - Has significant patient as to the natural history and progression of prostate cancer.  We discussed implications of his PI-RADS 5 lesion as well as trajectory of such lesions without intervention. - Discussed that transition to transperineal prostate biopsy significantly reduced episodes of post-biopsy sepsis - Patient to call the office within 1 week to discuss decision  Should pt decide to proceed with biopsy, plan for the following - CBC, BMP, PSA, UA, UCx - Medical Clearance - TP biopsy at [facility] [place] - Follow up 2 weeks after biopsy with his primary urologist or ourselves - We will call with the path results once they are resulted   He understands that many men with prostate cancer will die with the disease rather than of it and we also discussed the results large multi-center American and  prostate cancer screening trials. He also understands that PSA in and of itself does not diagnose prostate cancer but only assesses risk to a certain degree. The patient understands that to definitively screen for prostate cancer, a biopsy is required and this procedure has risks, including bleeding, infection, ED and urinary retention. The patient opted to move forward with the biopsy.   The patient is aware to expect hematuria x 2 weeks and up to 4 weeks of hematospermia.  There is a risk of infection albeit much lower than a transrectal approach. In some cases patients can experience erectile dysfunction, but this is usually self-limiting.  Any fever/chills after the biopsy the patient is to contact the office and go to the ER for an immediate evaluation. He has been given paper instructions outlining these items - which includes medications to avoid prior to surgery.  Prostate cancer screening: the patient and I spoke at length about prostate cancer screening, its risks and its benefits. The patient has one risk factors for prostate cancer.  He understands that many men with prostate cancer will die with the disease rather than of it and we also discussed the results large multi-center American and  prostate cancer screening trials. He also understands that PSA in and of itself does not diagnose prostate cancer but only assesses risk to a certain degree. The patient understands that to definitively screen for prostate cancer, a biopsy is required and this procedure has risks, including bleeding, infection, ED and urinary retention. The patient opted to think about pursuing prostate biopsy.  He is afraid that since he currently has no problems he will be making another problem for himself.  They will get back to the office with their decision within the week            Pia Forman MD Chief of Urology, [address], [address] [place] [address] Phone: [phone] Fax:     [phone]

## 2024-03-11 NOTE — HISTORY OF PRESENT ILLNESS
[Nocturia] : nocturia [FreeTextEntry1] : Referring Provider: Dr Escalante Chief Complaint: prostate lesion on MRI  Date of first visit: 03/11/2024   ANN MARIE NAVARRETE is a 83 year old [place]ian race man with a PMHx of HTN and glaucoma who presents for evaluation of Pirads 5 lesion on MRI.  He arrives today for consult of biopsy.  He states he has no urinary complaints is very satisfied with his urination and has nocturia once or twice a night.  He has never had an elevated PSA prior to this.  He is prostate biopsy 90 he explains he is nervous for biopsy as he had a history of urosepsis after cystoscopy many years ago.  There is no family history of  malignancy.  He was never smoker nor was he exposed to secondhand smoke.  He is currently retired.  His niece is present today and he wishes for her to be the      PSA Hx 5.55     01/19/24 4.09     09/22/23  Biopsy Hx n/a   MRI Hx MRI 2/21/2024. 49cc prostate with PIRADS 5 lesion that bulges/deforms the capsule without neurovascular bundle thickening measuring 16 x 27 x 27 mm  PZa-pl-pm base to apex. No LAD No EPE, No Bony Lesions. The images have been reviewed and clinical implications discussed with the patient.     03/11/2024 IPSS 4 QOL 0 VAL n/a     PMHx: Glaucoma, hypertension SxHx: Eyes FHx: Brother with CAD, no  malignancy SocHx: Seldom alcohol drinker, no tobacco, no secondhand smoke Allergies: None reported   The patient denies fevers, chills, nausea and or vomiting and no unexplained weight loss. All pertinent laboratory, films and physician notes were reviewed.  Questionnaire results were discussed with patient.

## 2024-04-01 ENCOUNTER — APPOINTMENT (OUTPATIENT)
Dept: FAMILY MEDICINE | Facility: CLINIC | Age: 84
End: 2024-04-01
Payer: MEDICARE

## 2024-04-01 VITALS
TEMPERATURE: 97.9 F | HEART RATE: 62 BPM | RESPIRATION RATE: 18 BRPM | DIASTOLIC BLOOD PRESSURE: 70 MMHG | HEIGHT: 68 IN | WEIGHT: 153 LBS | BODY MASS INDEX: 23.19 KG/M2 | OXYGEN SATURATION: 97 % | SYSTOLIC BLOOD PRESSURE: 120 MMHG

## 2024-04-01 DIAGNOSIS — R97.20 ELEVATED PROSTATE, SPECIFIC ANTIGEN [PSA]: ICD-10-CM

## 2024-04-01 DIAGNOSIS — E78.5 HYPERLIPIDEMIA, UNSPECIFIED: ICD-10-CM

## 2024-04-01 DIAGNOSIS — I10 ESSENTIAL (PRIMARY) HYPERTENSION: ICD-10-CM

## 2024-04-01 DIAGNOSIS — G47.00 INSOMNIA, UNSPECIFIED: ICD-10-CM

## 2024-04-01 PROCEDURE — 99214 OFFICE O/P EST MOD 30 MIN: CPT

## 2024-04-01 NOTE — PLAN
[FreeTextEntry1] : #HTN  -controlled  -f/u BMP  -continue current regimen of losartan 50 mg QD   #Insomnia  -discussed trial of melatonin -discussed if melatonin does not work, we can consider other options such as trazadone in the future   #HCM -deferred TDAP vaccine   -follow-up in Sept for annual exam

## 2024-04-01 NOTE — ASSESSMENT
[FreeTextEntry1] : 84 yo M PMH gout, HLD, urosepsis, visual impairment (L eye), gluacoma, pulmonary nodule presenting today for follow-up.

## 2024-04-01 NOTE — PROCEDURE NOTE - NSTRACHCUFF_RESP_A_CORE
Reached out to sister Vandana who is on pt's facesheet. She will  belongings some time today 4/1     Aretha Gonzalez  04/01/24 1016     cuffed

## 2024-04-01 NOTE — HISTORY OF PRESENT ILLNESS
[FreeTextEntry1] : follow-up  [de-identified] : 84 yo M PMH gout, HLD, urosepsis, visual impairment (L eye), glaucoma, pulmonary nodule presenting today for follow-up.  He is accompanied by his niece Kedar who he prefers to translate.  He follows w/  urology (Dr. Forman), cardiology (Dr. Shaik Reyes), Pulm (Dr. Ramirez), Ophthalmology.   He is hesitant to have prostate biopsy, they have a follow-up with urology in the future, he is still considering.   He mentions at times he wakes up at night and has trouble falling back asleep.

## 2024-04-16 LAB
ANION GAP SERPL CALC-SCNC: 10 MMOL/L
BUN SERPL-MCNC: 24 MG/DL
CALCIUM SERPL-MCNC: 9.2 MG/DL
CHLORIDE SERPL-SCNC: 105 MMOL/L
CO2 SERPL-SCNC: 26 MMOL/L
CREAT SERPL-MCNC: 1.32 MG/DL
EGFR: 53 ML/MIN/1.73M2
ESTIMATED AVERAGE GLUCOSE: 114 MG/DL
GLUCOSE SERPL-MCNC: 85 MG/DL
HBA1C MFR BLD HPLC: 5.6 %
POTASSIUM SERPL-SCNC: 4.5 MMOL/L
SODIUM SERPL-SCNC: 141 MMOL/L

## 2024-04-23 NOTE — ED ADULT NURSE NOTE - NSIMPLEMENTINTERV_GEN_ALL_ED
Yes
Implemented All Universal Safety Interventions:  [place] to call system. Call bell, personal items and telephone within reach. Instruct patient to call for assistance. Room bathroom lighting operational. Non-slip footwear when patient is off stretcher. Physically safe environment: no spills, clutter or unnecessary equipment. Stretcher in lowest position, wheels locked, appropriate side rails in place.

## 2024-05-17 ENCOUNTER — APPOINTMENT (OUTPATIENT)
Dept: UROLOGY | Facility: CLINIC | Age: 84
End: 2024-05-17
Payer: MEDICARE

## 2024-05-17 VITALS — HEART RATE: 72 BPM | OXYGEN SATURATION: 96 % | DIASTOLIC BLOOD PRESSURE: 79 MMHG | SYSTOLIC BLOOD PRESSURE: 168 MMHG

## 2024-05-17 DIAGNOSIS — R97.20 ELEVATED PROSTATE, SPECIFIC ANTIGEN [PSA]: ICD-10-CM

## 2024-05-17 DIAGNOSIS — N40.1 BENIGN PROSTATIC HYPERPLASIA WITH LOWER URINARY TRACT SYMPMS: ICD-10-CM

## 2024-05-17 DIAGNOSIS — N13.8 BENIGN PROSTATIC HYPERPLASIA WITH LOWER URINARY TRACT SYMPMS: ICD-10-CM

## 2024-05-17 PROCEDURE — 51798 US URINE CAPACITY MEASURE: CPT

## 2024-05-17 PROCEDURE — 51741 ELECTRO-UROFLOWMETRY FIRST: CPT

## 2024-05-17 PROCEDURE — 99214 OFFICE O/P EST MOD 30 MIN: CPT

## 2024-05-17 RX ORDER — TAMSULOSIN HYDROCHLORIDE 0.4 MG/1
0.4 CAPSULE ORAL
Qty: 180 | Refills: 3 | Status: ACTIVE | COMMUNITY
Start: 2021-06-25 | End: 1900-01-01

## 2024-05-17 NOTE — SIGNATURES
[TextEntry] : Janak Luther M.D. Minimally Invasive and Robotic Urologic Surgery [facility] | [facility]  of Urology [employer] at [facility] Tel: [phone] | Fax: [phone] | email: [email or web address]

## 2024-05-17 NOTE — ASSESSMENT
[FreeTextEntry1] : Mr. NAVARRETE is a 83 year-year-old    M with elevated PSA and BPH/LUTS.  High PVR discussed increasing tamsulosin dose to 0.8 mg, patient amenable.  Follow-up for uroflow PVR IPSS.  MRI 2/21/2024. 49cc prostate with PIRADS 5 lesion that bulges/deforms the capsule without neurovascular bundle thickening measuring 16 x 27 x 27 mm PZa-pl-pm base to apex. No LAD No EPE, No Bony Lesions. The images have been reviewed and clinical implications discussed with the patient.  The patient understands that PSA is a marker of cancer risk but does not diagnose cancer. He also understands that there are a number of benign conditions including UTI, BPH, certain activities and prostatitis that will increase PSA in the absence of cancer. Unfortunately the only way to definitively diagnose cancer is with a prostate biopsy. We discussed the method of performing prostate biopsy and the risks (bleeding, infection, urinary retention, ED). In addition to this, the patient and I discussed the discrepancy between the prevalence of prostate cancer and the prevalence of death from prostate cancer.  Prostate cancer is the most common solid tumor in American men. However, we discussed that it can be very slow growing, many men with prostate cancer will die with the disease rather than from it.   The PI-RADS system categorizes prostate lesions based on the likelihood of cancer according to a five-point scale, defined as the following:   PI-RADS 1  Clinically significant cancer is highly unlikely to be present. PI-RADS 2  Clinically significant cancer is unlikely to be present. PI-RADS 3  The presence of clinically significant cancer is equivocal. PI-RADS 4  Clinically significant cancer is likely to be present. PI-RADS 5  Clinically significant cancer is highly likely to be present. The probability of finding a clinically significant cancer in males with PI-RADS 3, 4, or 5 lesions was 12 and 23 percent, 60 and 49 percent, and 83 and 77 percent.   I gave the patient the following options: 1. Do nothing. This could miss an undiagnosed prostate cancer which may have little effect or significant effects at a later date. 2. Recheck his PSA in 3-6 months. We would then regroup and have this discussion about his options again. 3. Perform a prostate biopsy: I explained how a Transperineal targeted prostate biopsy is an office-based procedure (or at an outpatient facility) with local anesthesia. It is performed under TRUS and magnetic resonance imaging (MRI) guidance. Complications include but are not limited to hematuria, hematospermia, perineal hematoma, prostatitis, difficulty voiding.   Based on our discussion the patient wishes to think about whether to proceed or not with prostate biopsy.

## 2024-05-17 NOTE — HISTORY OF PRESENT ILLNESS
[FreeTextEntry1] : Mr. NAVARRETE is a 83 year-year-old    M who comes today to clinic for elevated PSA to 5.55<4.09 ng/mL referred by Dr. Forman.  Currently on tamsulosin 0.4 mg/day with good control of LUTS. MRI 2/21/2024. 49cc prostate with PIRADS 5 lesion that bulges/deforms the capsule without neurovascular bundle thickening measuring 16 x 27 x 27 mm PZa-pl-pm base to apex. No LAD No EPE, No Bony Lesions. The images have been reviewed and clinical implications discussed with the patient. No family history of Prostate Cancer. Denies LUTS, fevers, chills, flank pain, hematuria, AUR.

## 2024-06-06 DIAGNOSIS — R97.20 ELEVATED PROSTATE, SPECIFIC ANTIGEN [PSA]: ICD-10-CM

## 2024-06-07 ENCOUNTER — APPOINTMENT (OUTPATIENT)
Dept: PULMONOLOGY | Facility: CLINIC | Age: 84
End: 2024-06-07
Payer: MEDICARE

## 2024-06-07 VITALS
DIASTOLIC BLOOD PRESSURE: 60 MMHG | HEART RATE: 70 BPM | HEIGHT: 68 IN | BODY MASS INDEX: 23.19 KG/M2 | SYSTOLIC BLOOD PRESSURE: 116 MMHG | WEIGHT: 153 LBS | OXYGEN SATURATION: 96 % | RESPIRATION RATE: 17 BRPM

## 2024-06-07 DIAGNOSIS — J47.9 BRONCHIECTASIS, UNCOMPLICATED: ICD-10-CM

## 2024-06-07 PROCEDURE — G2211 COMPLEX E/M VISIT ADD ON: CPT

## 2024-06-07 PROCEDURE — 99212 OFFICE O/P EST SF 10 MIN: CPT

## 2024-06-07 NOTE — PHYSICAL EXAM
[III] : Mallampati Class: III [Normal Appearance] : normal appearance [Supple] : supple [No Neck Mass] : no neck mass [No JVD] : no jvd [Normal Rate/Rhythm] : normal rate/rhythm [Normal S1, S2] : normal s1, s2 [No Murmurs] : no murmurs [No Resp Distress] : no resp distress [No Acc Muscle Use] : no acc muscle use [Clear to Auscultation Bilaterally] : clear to auscultation bilaterally [Benign] : benign [Not Tender] : not tender [No Masses] : no masses [Soft] : soft [Normal Gait] : normal gait [No Clubbing] : no clubbing [No Edema] : no edema [No Rash] : no rash [No Motor Deficits] : no motor deficits [Normal Affect] : normal affect [TextBox_2] : pleasant male no distress speaking full sentences no cough today  [TextBox_11] : no lesion moist

## 2024-06-07 NOTE — HISTORY OF PRESENT ILLNESS
[Never] : never [TextBox_4] : 81y/o   male   born in [place]     never smoker         h/o  UTI    sepsis   2021 intubation 3-4 days -  here for wheezing OOH pneumonia  Discharge Date	07-Jan-2023 Admission Date	05-Jan-2023 00:04 Reason for Admission	AMS x 2 days Hospital Course	 HPI: History obtained from Patient and Patients Niece Patient currently AAO x 3     82 M with pmhx BPH, HTN brought in by family for cold, chills. Patient complains of some difficulty breathing but denies a cough. No known fever.  No vomiting or diarrhea.  No urinary symptoms.   In the ed UA was positive patient received 1 dose of Ceftriaxone  (05 Jan 2023 10:26) Stared patient on Ceftriaxone CT abdomen shows pleural effusions possible Pneumonia Added Zithromax today Blood, Urine cx no growth   D/C planning on [place] D/C planning today       Med Reconciliation: Medication Reconciliation Status	Admission Reconciliation is Not Complete Discharge   -baseline walks with cane - wheezing intermittently  -recurrent    infection and antibiotics   4/21/2023 -f/u feeling much better with nebulizer - swallow  aspiration with large   sips +   c/w   6/23/2023 82y/o  male never smoker born in [place]   h/o pneumonia  ? aspiration  f/u      asthma ct - improved ct chest  - neck ct no masses - on budesonide q 12 and prn  duonebs  -doing well per daughter improved overall   -no cough  - no n/v/ -  11/3/2023 82y/o  male  never smoker    h/o asthma   -doing well no dysphagia no cough no sob  - nebulizer prn only  -

## 2024-06-07 NOTE — ASSESSMENT
[FreeTextEntry1] : 83y/o male never smoker from [place]  1- bronchiectasis / asthma trigger dysphagia - + aspiration of liquids when drinking fast  improved  poor dentition   2- ct 5/2023 stable nodule since 2021 resolved LLL pneumonia  3- ct with esophagus dilation -possible GERD aspiration  4- deconditioning with recurrent UTI / prostate issues refuses biopsy   5 - vaccinations per primary  FLU     Recommendations  1- budesonide q 12  2- albuterol Neb prn  3- sips and dysphagia diet/    dental    4- cxr  [place] [phone] return in  6- 12months   stable  improved

## 2024-06-24 ENCOUNTER — APPOINTMENT (OUTPATIENT)
Dept: FAMILY MEDICINE | Facility: CLINIC | Age: 84
End: 2024-06-24
Payer: MEDICARE

## 2024-06-24 VITALS
RESPIRATION RATE: 18 BRPM | WEIGHT: 153 LBS | SYSTOLIC BLOOD PRESSURE: 130 MMHG | HEART RATE: 57 BPM | DIASTOLIC BLOOD PRESSURE: 80 MMHG | OXYGEN SATURATION: 97 % | HEIGHT: 68 IN | TEMPERATURE: 97.4 F | BODY MASS INDEX: 23.19 KG/M2

## 2024-06-24 DIAGNOSIS — R26.89 OTHER ABNORMALITIES OF GAIT AND MOBILITY: ICD-10-CM

## 2024-06-24 DIAGNOSIS — R29.898 OTHER SYMPTOMS AND SIGNS INVOLVING THE MUSCULOSKELETAL SYSTEM: ICD-10-CM

## 2024-06-24 DIAGNOSIS — H54.3 UNQUALIFIED VISUAL LOSS, BOTH EYES: ICD-10-CM

## 2024-06-24 PROCEDURE — 99213 OFFICE O/P EST LOW 20 MIN: CPT

## 2024-06-28 ENCOUNTER — RX RENEWAL (OUTPATIENT)
Age: 84
End: 2024-06-28

## 2024-07-05 ENCOUNTER — APPOINTMENT (OUTPATIENT)
Dept: UROLOGY | Facility: CLINIC | Age: 84
End: 2024-07-05

## 2024-07-05 DIAGNOSIS — N13.8 BENIGN PROSTATIC HYPERPLASIA WITH LOWER URINARY TRACT SYMPMS: ICD-10-CM

## 2024-07-05 DIAGNOSIS — N40.1 BENIGN PROSTATIC HYPERPLASIA WITH LOWER URINARY TRACT SYMPMS: ICD-10-CM

## 2024-08-02 ENCOUNTER — APPOINTMENT (OUTPATIENT)
Dept: UROLOGY | Facility: CLINIC | Age: 84
End: 2024-08-02
Payer: MEDICARE

## 2024-08-02 VITALS
BODY MASS INDEX: 23.49 KG/M2 | WEIGHT: 155 LBS | HEART RATE: 57 BPM | HEIGHT: 68 IN | DIASTOLIC BLOOD PRESSURE: 74 MMHG | RESPIRATION RATE: 16 BRPM | TEMPERATURE: 98 F | OXYGEN SATURATION: 95 % | SYSTOLIC BLOOD PRESSURE: 144 MMHG

## 2024-08-02 DIAGNOSIS — R97.20 ELEVATED PROSTATE, SPECIFIC ANTIGEN [PSA]: ICD-10-CM

## 2024-08-02 DIAGNOSIS — N13.8 BENIGN PROSTATIC HYPERPLASIA WITH LOWER URINARY TRACT SYMPMS: ICD-10-CM

## 2024-08-02 DIAGNOSIS — N40.1 BENIGN PROSTATIC HYPERPLASIA WITH LOWER URINARY TRACT SYMPMS: ICD-10-CM

## 2024-08-02 PROCEDURE — 99214 OFFICE O/P EST MOD 30 MIN: CPT

## 2024-08-02 PROCEDURE — 51798 US URINE CAPACITY MEASURE: CPT

## 2024-08-02 NOTE — HISTORY OF PRESENT ILLNESS
[FreeTextEntry1] : Mr. NAVARRETE is a 83 year-year-old    M who comes today to clinic for elevated PSA to 5.55<4.09 ng/mL referred by Dr. Forman.  Currently on tamsulosin 0.8 mg/day with good control of LUTS. MRI 2/21/2024. 49cc prostate with PIRADS 5 lesion that bulges/deforms the capsule without neurovascular bundle thickening measuring 16 x 27 x 27 mm PZa-pl-pm base to apex. No LAD No EPE, No Bony Lesions. The images have been reviewed and clinical implications discussed with the patient. No family history of Prostate Cancer. Denies LUTS, fevers, chills, flank pain, hematuria, AUR.

## 2024-08-02 NOTE — ASSESSMENT
[FreeTextEntry1] : Mr. NAVARRETE is a 83 year-year-old    M with elevated PSA and BPH/LUTS.  High PVRs, albeit manageable. Follow-up for uroflow PVR IPSS.  MRI 2/21/2024. 49cc prostate with PIRADS 5 lesion that bulges/deforms the capsule without neurovascular bundle thickening measuring 16 x 27 x 27 mm PZa-pl-pm base to apex. No LAD No EPE, No Bony Lesions. The images have been reviewed and clinical implications discussed with the patient. Based on our discussion the patient wishes to not proceed with biopsy given his age.  The patient understands that this could miss an undiagnosed prostate cancer which may have little effect or significant effects at a later date.   The patient understands that PSA is a marker of cancer risk but does not diagnose cancer. He also understands that there are a number of benign conditions including UTI, BPH, certain activities and prostatitis that will increase PSA in the absence of cancer. Unfortunately the only way to definitively diagnose cancer is with a prostate biopsy. We discussed the method of performing prostate biopsy and the risks (bleeding, infection, urinary retention, ED). In addition to this, the patient and I discussed the discrepancy between the prevalence of prostate cancer and the prevalence of death from prostate cancer.  Prostate cancer is the most common solid tumor in American men. However, we discussed that it can be very slow growing, many men with prostate cancer will die with the disease rather than from it.   The PI-RADS system categorizes prostate lesions based on the likelihood of cancer according to a five-point scale, defined as the following:   PI-RADS 1  Clinically significant cancer is highly unlikely to be present. PI-RADS 2  Clinically significant cancer is unlikely to be present. PI-RADS 3  The presence of clinically significant cancer is equivocal. PI-RADS 4  Clinically significant cancer is likely to be present. PI-RADS 5  Clinically significant cancer is highly likely to be present. The probability of finding a clinically significant cancer in males with PI-RADS 3, 4, or 5 lesions was 12 and 23 percent, 60 and 49 percent, and 83 and 77 percent.   I gave the patient the following options: 1. Do nothing. This could miss an undiagnosed prostate cancer which may have little effect or significant effects at a later date. 2. Recheck his PSA in 3-6 months. We would then regroup and have this discussion about his options again. 3. Perform a prostate biopsy: I explained how a Transperineal targeted prostate biopsy is an office-based procedure (or at an outpatient facility) with local anesthesia. It is performed under TRUS and magnetic resonance imaging (MRI) guidance. Complications include but are not limited to hematuria, hematospermia, perineal hematoma, prostatitis, difficulty voiding.

## 2024-08-02 NOTE — SIGNATURES
[TextEntry] : Janak Luther M.D. Minimally Invasive and Robotic Urologic Surgery [facility] | [facility]  of Urology [employer] at [facility] Tel: [phone] | Fax: [phone] | email: [email or web address]

## 2024-09-23 ENCOUNTER — APPOINTMENT (OUTPATIENT)
Dept: FAMILY MEDICINE | Facility: CLINIC | Age: 84
End: 2024-09-23
Payer: MEDICARE

## 2024-09-23 VITALS
WEIGHT: 153 LBS | SYSTOLIC BLOOD PRESSURE: 116 MMHG | OXYGEN SATURATION: 98 % | RESPIRATION RATE: 12 BRPM | HEIGHT: 68 IN | TEMPERATURE: 97.1 F | HEART RATE: 60 BPM | BODY MASS INDEX: 23.19 KG/M2 | DIASTOLIC BLOOD PRESSURE: 64 MMHG

## 2024-09-23 DIAGNOSIS — H93.19 TINNITUS, UNSPECIFIED EAR: ICD-10-CM

## 2024-09-23 DIAGNOSIS — M54.50 LOW BACK PAIN, UNSPECIFIED: ICD-10-CM

## 2024-09-23 DIAGNOSIS — Z00.00 ENCOUNTER FOR GENERAL ADULT MEDICAL EXAMINATION W/OUT ABNORMAL FINDINGS: ICD-10-CM

## 2024-09-23 DIAGNOSIS — H91.90 UNSPECIFIED HEARING LOSS, UNSPECIFIED EAR: ICD-10-CM

## 2024-09-23 PROCEDURE — 90662 IIV NO PRSV INCREASED AG IM: CPT

## 2024-09-23 PROCEDURE — G0439: CPT

## 2024-09-23 PROCEDURE — G0008: CPT

## 2024-09-23 NOTE — PHYSICAL EXAM
[Normal Rate] : normal rate  [Regular Rhythm] : with a regular rhythm [Soft] : abdomen soft [Non Tender] : non-tender [No Spinal Tenderness] : no spinal tenderness [Normal] : affect was normal and insight and judgment were intact [de-identified] : negative straight leg raise

## 2024-09-23 NOTE — HISTORY OF PRESENT ILLNESS
[de-identified] : 83 yo M PMH gout, HLD, urosepsis, visual impairment (L eye), glaucoma, pulmonary nodule presenting today to for annual exam.  He is accompanied by his niece, Kedar.  He notes for the past week a ringing in his R ear. Denies pain, ear discharge, headaches, vision changes. Admits to hearing loss, admits to pulsatile pattern.   He also notes that after sitting for a prolonged period of time, when he stands his has lower back/hip pain. Also admits to numbness sensation down leg. Denies pain otherwise, tingling, weakness.       He follows w/ urology (Dr. Morton), cardiology (Dr. Shaik Reyes), Pulm (Dr. Ramirez), Ophthalmology.

## 2024-09-23 NOTE — PHYSICAL EXAM
[Normal Rate] : normal rate  [Regular Rhythm] : with a regular rhythm [Soft] : abdomen soft [Non Tender] : non-tender [No Spinal Tenderness] : no spinal tenderness [Normal] : affect was normal and insight and judgment were intact [de-identified] : negative straight leg raise

## 2024-09-23 NOTE — ASSESSMENT
[FreeTextEntry1] : 85 yo M PMH gout, HLD, urosepsis, visual impairment (L eye), glaucoma, pulmonary nodule presenting today to for annual exam and back pain/tinnitus.

## 2024-09-23 NOTE — HISTORY OF PRESENT ILLNESS
[de-identified] : 85 yo M PMH gout, HLD, urosepsis, visual impairment (L eye), glaucoma, pulmonary nodule presenting today to for annual exam.  He is accompanied by his niece, Kedar.  He notes for the past week a ringing in his R ear. Denies pain, ear discharge, headaches, vision changes. Admits to hearing loss, admits to pulsatile pattern.   He also notes that after sitting for a prolonged period of time, when he stands his has lower back/hip pain. Also admits to numbness sensation down leg. Denies pain otherwise, tingling, weakness.       He follows w/ urology (Dr. Morton), cardiology (Dr. Shaik Reyes), Pulm (Dr. Ramirez), Ophthalmology.

## 2024-09-23 NOTE — REVIEW OF SYSTEMS
[Fever] : no fever [Chills] : no chills [Earache] : no earache [Hearing Loss] : hearing loss [Back Pain] : back pain [Negative] : Psychiatric [FreeTextEntry4] : tinnitus

## 2024-09-23 NOTE — PLAN
[FreeTextEntry1] : #HCM -lab work script provided -vaccinations:  COVID reports had 3 vaccines  Influenza : given today by MAYO Garcia - aware can receive at pharmacy  TDAP: defers till later  PNA: up to date  -depression screen negative -colon cancer screening: no longer wishes to continue, age > 80  -advised to follow-up w/ Derm for skin cancer screening -appreciated urology note, pt declines biopsy for elevated PSA   #Back pain -only from sitting to standing after prolonged period of time, but does admit to numbness  -f/u Xray lower back and hip -f/u with ortho sports medicine -discussed PT - prefers to see ortho first   #Tinnitus/Hearing changes -discussed if pulsatile, doing MRI - prefers to see ENT first  -f/u with ENT

## 2024-09-23 NOTE — HEALTH RISK ASSESSMENT
[Never] : Never [Patient declined colonoscopy] : Patient declined colonoscopy [With Significant Other] : lives with significant other [Retired] : retired [] :  [# Of Children ___] : has [unfilled] children [Yes] : Yes [2 - 4 times a month (2 pts)] : 2-4 times a month (2 points) [1 or 2 (0 pts)] : 1 or 2 (0 points) [Never (0 pts)] : Never (0 points) [No] : In the past 12 months have you used drugs other than those required for medical reasons? No [No falls in past year] : Patient reported no falls in the past year [0] : 2) Feeling down, depressed, or hopeless: Not at all (0) [PHQ-2 Negative - No further assessment needed] : PHQ-2 Negative - No further assessment needed [Independent] : using telephone [Some assistance needed] : managing finances [Audit-CScore] : 2 [[id number]] : 0 [de-identified] : retired

## 2024-09-23 NOTE — HEALTH RISK ASSESSMENT
[Never] : Never [Patient declined colonoscopy] : Patient declined colonoscopy [With Significant Other] : lives with significant other [Retired] : retired [] :  [# Of Children ___] : has [unfilled] children [Yes] : Yes [2 - 4 times a month (2 pts)] : 2-4 times a month (2 points) [1 or 2 (0 pts)] : 1 or 2 (0 points) [Never (0 pts)] : Never (0 points) [No] : In the past 12 months have you used drugs other than those required for medical reasons? No [No falls in past year] : Patient reported no falls in the past year [0] : 2) Feeling down, depressed, or hopeless: Not at all (0) [PHQ-2 Negative - No further assessment needed] : PHQ-2 Negative - No further assessment needed [Independent] : using telephone [Some assistance needed] : managing finances [Audit-CScore] : 2 [[id number]] : 0 [de-identified] : retired

## 2024-10-04 ENCOUNTER — APPOINTMENT (OUTPATIENT)
Dept: RADIOLOGY | Facility: CLINIC | Age: 84
End: 2024-10-04

## 2024-10-04 ENCOUNTER — OUTPATIENT (OUTPATIENT)
Dept: OUTPATIENT SERVICES | Facility: HOSPITAL | Age: 84
LOS: 1 days | End: 2024-10-04
Payer: MEDICARE

## 2024-10-04 DIAGNOSIS — M54.50 LOW BACK PAIN, UNSPECIFIED: ICD-10-CM

## 2024-10-04 DIAGNOSIS — Z98.890 OTHER SPECIFIED POSTPROCEDURAL STATES: Chronic | ICD-10-CM

## 2024-10-04 PROCEDURE — 73521 X-RAY EXAM HIPS BI 2 VIEWS: CPT

## 2024-10-04 PROCEDURE — 72100 X-RAY EXAM L-S SPINE 2/3 VWS: CPT | Mod: 26

## 2024-10-04 PROCEDURE — 73521 X-RAY EXAM HIPS BI 2 VIEWS: CPT | Mod: 26

## 2024-10-04 PROCEDURE — 72100 X-RAY EXAM L-S SPINE 2/3 VWS: CPT

## 2024-10-17 ENCOUNTER — APPOINTMENT (OUTPATIENT)
Dept: OTOLARYNGOLOGY | Facility: CLINIC | Age: 84
End: 2024-10-17

## 2024-10-25 ENCOUNTER — APPOINTMENT (OUTPATIENT)
Dept: PHYSICAL MEDICINE AND REHAB | Facility: CLINIC | Age: 84
End: 2024-10-25
Payer: MEDICARE

## 2024-10-25 ENCOUNTER — NON-APPOINTMENT (OUTPATIENT)
Age: 84
End: 2024-10-25

## 2024-10-25 VITALS
HEART RATE: 58 BPM | OXYGEN SATURATION: 96 % | DIASTOLIC BLOOD PRESSURE: 77 MMHG | BODY MASS INDEX: 23.34 KG/M2 | HEIGHT: 68 IN | WEIGHT: 154 LBS | SYSTOLIC BLOOD PRESSURE: 155 MMHG | TEMPERATURE: 97.9 F

## 2024-10-25 DIAGNOSIS — M51.369: ICD-10-CM

## 2024-10-25 DIAGNOSIS — M47.816 SPONDYLOSIS W/OUT MYELOPATHY OR RADICULOPATHY, LUMBAR REGION: ICD-10-CM

## 2024-10-25 DIAGNOSIS — M54.16 RADICULOPATHY, LUMBAR REGION: ICD-10-CM

## 2024-10-25 PROCEDURE — 99204 OFFICE O/P NEW MOD 45 MIN: CPT

## 2024-10-25 RX ORDER — LIDOCAINE 40 MG/G
4 PATCH TOPICAL
Qty: 3 | Refills: 2 | Status: ACTIVE | COMMUNITY
Start: 2024-10-25 | End: 1900-01-01

## 2024-10-25 RX ORDER — DICLOFENAC SODIUM 10 MG/G
1 GEL TOPICAL DAILY
Qty: 1 | Refills: 1 | Status: ACTIVE | COMMUNITY
Start: 2024-10-25 | End: 1900-01-01

## 2024-12-06 ENCOUNTER — NON-APPOINTMENT (OUTPATIENT)
Age: 84
End: 2024-12-06

## 2024-12-06 ENCOUNTER — APPOINTMENT (OUTPATIENT)
Dept: OTOLARYNGOLOGY | Facility: CLINIC | Age: 84
End: 2024-12-06
Payer: MEDICARE

## 2024-12-06 VITALS
WEIGHT: 154 LBS | DIASTOLIC BLOOD PRESSURE: 67 MMHG | HEIGHT: 68 IN | BODY MASS INDEX: 23.34 KG/M2 | HEART RATE: 55 BPM | TEMPERATURE: 97.5 F | SYSTOLIC BLOOD PRESSURE: 115 MMHG

## 2024-12-06 DIAGNOSIS — H90.3 SENSORINEURAL HEARING LOSS, BILATERAL: ICD-10-CM

## 2024-12-06 DIAGNOSIS — H90.A21 SENSORINEURAL HEARING LOSS, UNILATERAL, RIGHT EAR, WITH RESTRICTED HEARING ON THE CONTRALATERAL SIDE: ICD-10-CM

## 2024-12-06 PROCEDURE — 92567 TYMPANOMETRY: CPT

## 2024-12-06 PROCEDURE — 92557 COMPREHENSIVE HEARING TEST: CPT

## 2024-12-06 PROCEDURE — 99214 OFFICE O/P EST MOD 30 MIN: CPT

## 2024-12-20 ENCOUNTER — OUTPATIENT (OUTPATIENT)
Dept: OUTPATIENT SERVICES | Facility: HOSPITAL | Age: 84
LOS: 1 days | End: 2024-12-20
Payer: MEDICARE

## 2024-12-20 ENCOUNTER — APPOINTMENT (OUTPATIENT)
Dept: MRI IMAGING | Facility: CLINIC | Age: 84
End: 2024-12-20
Payer: MEDICARE

## 2024-12-20 DIAGNOSIS — Z98.890 OTHER SPECIFIED POSTPROCEDURAL STATES: Chronic | ICD-10-CM

## 2024-12-20 DIAGNOSIS — H90.3 SENSORINEURAL HEARING LOSS, BILATERAL: ICD-10-CM

## 2024-12-20 DIAGNOSIS — H90.A21 SENSORINEURAL HEARING LOSS, UNILATERAL, RIGHT EAR, WITH RESTRICTED HEARING ON THE CONTRALATERAL SIDE: ICD-10-CM

## 2024-12-20 DIAGNOSIS — Z00.8 ENCOUNTER FOR OTHER GENERAL EXAMINATION: ICD-10-CM

## 2024-12-20 DIAGNOSIS — H93.19 TINNITUS, UNSPECIFIED EAR: ICD-10-CM

## 2024-12-20 PROCEDURE — A9585: CPT

## 2024-12-20 PROCEDURE — 70553 MRI BRAIN STEM W/O & W/DYE: CPT | Mod: 26

## 2024-12-20 PROCEDURE — 70553 MRI BRAIN STEM W/O & W/DYE: CPT

## 2025-01-10 ENCOUNTER — APPOINTMENT (OUTPATIENT)
Dept: PHYSICAL MEDICINE AND REHAB | Facility: CLINIC | Age: 85
End: 2025-01-10
Payer: MEDICARE

## 2025-01-10 VITALS
BODY MASS INDEX: 23.34 KG/M2 | HEIGHT: 68 IN | HEART RATE: 67 BPM | DIASTOLIC BLOOD PRESSURE: 68 MMHG | OXYGEN SATURATION: 98 % | WEIGHT: 154 LBS | SYSTOLIC BLOOD PRESSURE: 118 MMHG | TEMPERATURE: 98 F

## 2025-01-10 DIAGNOSIS — M51.369: ICD-10-CM

## 2025-01-10 DIAGNOSIS — R26.89 OTHER ABNORMALITIES OF GAIT AND MOBILITY: ICD-10-CM

## 2025-01-10 PROCEDURE — 99215 OFFICE O/P EST HI 40 MIN: CPT

## 2025-02-07 ENCOUNTER — APPOINTMENT (OUTPATIENT)
Dept: UROLOGY | Facility: CLINIC | Age: 85
End: 2025-02-07
Payer: MEDICARE

## 2025-02-07 DIAGNOSIS — N13.8 BENIGN PROSTATIC HYPERPLASIA WITH LOWER URINARY TRACT SYMPMS: ICD-10-CM

## 2025-02-07 DIAGNOSIS — C61 MALIGNANT NEOPLASM OF PROSTATE: ICD-10-CM

## 2025-02-07 DIAGNOSIS — N40.1 BENIGN PROSTATIC HYPERPLASIA WITH LOWER URINARY TRACT SYMPMS: ICD-10-CM

## 2025-02-07 PROCEDURE — 99214 OFFICE O/P EST MOD 30 MIN: CPT

## 2025-02-07 PROCEDURE — 51798 US URINE CAPACITY MEASURE: CPT

## 2025-02-07 PROCEDURE — 51741 ELECTRO-UROFLOWMETRY FIRST: CPT

## 2025-03-04 ENCOUNTER — RX RENEWAL (OUTPATIENT)
Age: 85
End: 2025-03-04

## 2025-03-10 ENCOUNTER — APPOINTMENT (OUTPATIENT)
Dept: FAMILY MEDICINE | Facility: CLINIC | Age: 85
End: 2025-03-10
Payer: MEDICARE

## 2025-03-10 VITALS
WEIGHT: 153 LBS | OXYGEN SATURATION: 94 % | RESPIRATION RATE: 12 BRPM | DIASTOLIC BLOOD PRESSURE: 68 MMHG | BODY MASS INDEX: 23.19 KG/M2 | TEMPERATURE: 97.3 F | HEIGHT: 68 IN | SYSTOLIC BLOOD PRESSURE: 118 MMHG | HEART RATE: 68 BPM

## 2025-03-10 DIAGNOSIS — I10 ESSENTIAL (PRIMARY) HYPERTENSION: ICD-10-CM

## 2025-03-10 PROCEDURE — 99214 OFFICE O/P EST MOD 30 MIN: CPT

## 2025-03-14 ENCOUNTER — LABORATORY RESULT (OUTPATIENT)
Age: 85
End: 2025-03-14

## 2025-03-17 NOTE — PATIENT PROFILE ADULT. - BRADEN SCORE (IF 18 OR LESS ACTIVATE SKIN INJURY RISK INCREASED GUIDELINE), MLM
Rest, hydrate    ER with Headache, chest pain, neuro deficit    Follow up if not improving before the weekend   22

## 2025-04-04 ENCOUNTER — APPOINTMENT (OUTPATIENT)
Dept: UROLOGY | Facility: CLINIC | Age: 85
End: 2025-04-04
Payer: MEDICARE

## 2025-04-04 DIAGNOSIS — N40.1 BENIGN PROSTATIC HYPERPLASIA WITH LOWER URINARY TRACT SYMPMS: ICD-10-CM

## 2025-04-04 DIAGNOSIS — R35.0 FREQUENCY OF MICTURITION: ICD-10-CM

## 2025-04-04 DIAGNOSIS — R97.20 ELEVATED PROSTATE, SPECIFIC ANTIGEN [PSA]: ICD-10-CM

## 2025-04-04 DIAGNOSIS — N39.0 URINARY TRACT INFECTION, SITE NOT SPECIFIED: ICD-10-CM

## 2025-04-04 DIAGNOSIS — N13.8 BENIGN PROSTATIC HYPERPLASIA WITH LOWER URINARY TRACT SYMPMS: ICD-10-CM

## 2025-04-04 PROCEDURE — 51701 INSERT BLADDER CATHETER: CPT

## 2025-04-04 PROCEDURE — 51798 US URINE CAPACITY MEASURE: CPT

## 2025-04-07 PROBLEM — N39.0 URINARY TRACT INFECTION: Status: ACTIVE | Noted: 2025-04-07

## 2025-04-10 RX ORDER — FLUCONAZOLE 150 MG/1
150 TABLET ORAL DAILY
Qty: 7 | Refills: 0 | Status: ACTIVE | COMMUNITY
Start: 2025-04-10 | End: 1900-01-01

## 2025-04-11 LAB
APPEARANCE: ABNORMAL
BACTERIA UR CULT: NORMAL
BACTERIA: ABNORMAL /HPF
BILIRUBIN URINE: NEGATIVE
BLOOD URINE: ABNORMAL
CAST: 1 /LPF
COLOR: YELLOW
EPITHELIAL CELLS: 0 /HPF
GLUCOSE QUALITATIVE U: NEGATIVE MG/DL
KETONES URINE: NEGATIVE MG/DL
LEUKOCYTE ESTERASE URINE: ABNORMAL
MICROSCOPIC-UA: NORMAL
NITRITE URINE: NEGATIVE
PH URINE: 6
PROTEIN URINE: NORMAL MG/DL
RED BLOOD CELLS URINE: 2 /HPF
REVIEW: NORMAL
SPECIFIC GRAVITY URINE: 1.01
UROBILINOGEN URINE: 0.2 MG/DL
WHITE BLOOD CELLS URINE: 456 /HPF

## 2025-04-12 LAB — FUNGUS SPEC CULT ORG #8: ABNORMAL

## 2025-05-19 ENCOUNTER — APPOINTMENT (OUTPATIENT)
Dept: FAMILY MEDICINE | Facility: CLINIC | Age: 85
End: 2025-05-19
Payer: MEDICARE

## 2025-05-19 VITALS
BODY MASS INDEX: 23.19 KG/M2 | DIASTOLIC BLOOD PRESSURE: 60 MMHG | HEART RATE: 61 BPM | WEIGHT: 153 LBS | HEIGHT: 68 IN | RESPIRATION RATE: 12 BRPM | TEMPERATURE: 97.8 F | SYSTOLIC BLOOD PRESSURE: 120 MMHG | OXYGEN SATURATION: 97 %

## 2025-05-19 DIAGNOSIS — H54.3 UNQUALIFIED VISUAL LOSS, BOTH EYES: ICD-10-CM

## 2025-05-19 DIAGNOSIS — R29.898 OTHER SYMPTOMS AND SIGNS INVOLVING THE MUSCULOSKELETAL SYSTEM: ICD-10-CM

## 2025-05-19 PROCEDURE — 99213 OFFICE O/P EST LOW 20 MIN: CPT

## 2025-06-17 NOTE — PHYSICAL EXAM
[Normal] : soft, non-tender, non-distended, no masses palpated, no HSM and normal bowel sounds 4 = No assist / stand by assistance

## 2025-06-24 NOTE — H&P ADULT - NSHPREVIEWOFSYSTEMS_GEN_ALL_CORE
Port site closure underway -    CONSTITUTIONAL: No fever, weight loss, or fatigue.  EYES: No eye pain, visual disturbances, or discharge.  ENMT:  No difficulty hearing, tinnitus, vertigo; No sinus or throat pain.  NECK: No pain or stiffness.  RESPIRATORY: No cough, wheezing, or hemoptysis; No shortness of breath.  CARDIOVASCULAR: No chest pain, palpitations, dizziness, or leg swelling.  GASTROINTESTINAL: No abdominal pain, no nausea, vomiting, or hematemesis; No diarrhea or Change in bowel habits. No melena or hematochezia.  GENITOURINARY: No dysuria, frequency, hematuria, or incontinence.  NEUROLOGICAL: No headaches, focal muscle weakness, numbness, or tremors.  SKIN: No itching, burning or rashes.  MUSCULOSKELETAL: No joint swelling or pain.  PSYCHIATRIC: No depression, anxiety, or agitation.  HEME/LYMPH: No easy bruising, bleeding gums, or nose bleed.  ALLERGY AND IMMUNOLOGIC: No hives or eczema.

## 2025-08-08 ENCOUNTER — APPOINTMENT (OUTPATIENT)
Dept: UROLOGY | Facility: CLINIC | Age: 85
End: 2025-08-08

## 2025-08-26 ENCOUNTER — RX RENEWAL (OUTPATIENT)
Age: 85
End: 2025-08-26

## 2025-09-05 ENCOUNTER — APPOINTMENT (OUTPATIENT)
Dept: UROLOGY | Facility: CLINIC | Age: 85
End: 2025-09-05
Payer: MEDICARE

## 2025-09-05 DIAGNOSIS — N40.1 BENIGN PROSTATIC HYPERPLASIA WITH LOWER URINARY TRACT SYMPMS: ICD-10-CM

## 2025-09-05 DIAGNOSIS — R97.20 ELEVATED PROSTATE, SPECIFIC ANTIGEN [PSA]: ICD-10-CM

## 2025-09-05 DIAGNOSIS — N13.8 BENIGN PROSTATIC HYPERPLASIA WITH LOWER URINARY TRACT SYMPMS: ICD-10-CM

## 2025-09-05 PROCEDURE — 51798 US URINE CAPACITY MEASURE: CPT

## 2025-09-05 PROCEDURE — 99213 OFFICE O/P EST LOW 20 MIN: CPT
